# Patient Record
Sex: FEMALE | Race: WHITE | Employment: OTHER | URBAN - NONMETROPOLITAN AREA
[De-identification: names, ages, dates, MRNs, and addresses within clinical notes are randomized per-mention and may not be internally consistent; named-entity substitution may affect disease eponyms.]

---

## 2019-01-01 ENCOUNTER — TELEPHONE (OUTPATIENT)
Dept: NEPHROLOGY | Age: 84
End: 2019-01-01

## 2019-01-01 ENCOUNTER — HOSPITAL ENCOUNTER (OUTPATIENT)
Age: 84
Discharge: HOME | End: 2019-12-19
Payer: MEDICARE

## 2019-01-01 DIAGNOSIS — I48.21 PERMANENT ATRIAL FIBRILLATION (HCC): ICD-10-CM

## 2019-01-01 DIAGNOSIS — I50.42 CHRONIC COMBINED SYSTOLIC AND DIASTOLIC CONGESTIVE HEART FAILURE (HCC): ICD-10-CM

## 2019-01-01 DIAGNOSIS — I42.0 DILATED CARDIOMYOPATHY (HCC): ICD-10-CM

## 2019-01-01 DIAGNOSIS — I34.0 SEVERE MITRAL REGURGITATION BY PRIOR ECHOCARDIOGRAM: ICD-10-CM

## 2019-01-01 DIAGNOSIS — N18.30 CKD (CHRONIC KIDNEY DISEASE), STAGE III (HCC): ICD-10-CM

## 2019-01-01 LAB
ANION GAP SERPL CALCULATED.3IONS-SCNC: 14 MEQ/L (ref 8–16)
BUN BLDV-MCNC: 30 MG/DL (ref 7–22)
CALCIUM SERPL-MCNC: 9.9 MG/DL (ref 8.5–10.5)
CHLORIDE BLD-SCNC: 95 MEQ/L (ref 98–111)
CO2: 29 MEQ/L (ref 23–33)
CREAT SERPL-MCNC: 0.8 MG/DL (ref 0.4–1.2)
ERYTHROCYTE [DISTWIDTH] IN BLOOD BY AUTOMATED COUNT: 17.8 % (ref 11.5–14.5)
ERYTHROCYTE [DISTWIDTH] IN BLOOD BY AUTOMATED COUNT: 67.3 FL (ref 35–45)
GFR SERPL CREATININE-BSD FRML MDRD: 67 ML/MIN/1.73M2
GLUCOSE BLD-MCNC: 99 MG/DL (ref 70–108)
HCT VFR BLD CALC: 50.3 % (ref 37–47)
HEMOGLOBIN: 15.5 GM/DL (ref 12–16)
MAGNESIUM: 2.3 MG/DL (ref 1.6–2.4)
MCH RBC QN AUTO: 31.5 PG (ref 26–33)
MCHC RBC AUTO-ENTMCNC: 30.8 GM/DL (ref 32.2–35.5)
MCV RBC AUTO: 102.2 FL (ref 81–99)
PLATELET # BLD: 168 THOU/MM3 (ref 130–400)
PMV BLD AUTO: 11.8 FL (ref 9.4–12.4)
POTASSIUM SERPL-SCNC: 4.8 MEQ/L (ref 3.5–5.2)
RBC # BLD: 4.92 MILL/MM3 (ref 4.2–5.4)
SCAN OF BLOOD SMEAR: NORMAL
SODIUM BLD-SCNC: 138 MEQ/L (ref 135–145)
WBC # BLD: 6.2 THOU/MM3 (ref 4.8–10.8)

## 2019-01-01 PROCEDURE — 80048 BASIC METABOLIC PNL TOTAL CA: CPT

## 2019-01-01 PROCEDURE — 85027 COMPLETE CBC AUTOMATED: CPT

## 2019-01-01 PROCEDURE — 83735 ASSAY OF MAGNESIUM: CPT

## 2019-01-01 PROCEDURE — 36415 COLL VENOUS BLD VENIPUNCTURE: CPT

## 2019-01-01 RX ORDER — MIDODRINE HYDROCHLORIDE 10 MG/1
10 TABLET ORAL
Qty: 90 TABLET | Refills: 2 | Status: SHIPPED | OUTPATIENT
Start: 2019-01-01 | End: 2020-01-01

## 2019-01-01 RX ORDER — MIDODRINE HYDROCHLORIDE 10 MG/1
TABLET ORAL
Qty: 90 TABLET | Refills: 10 | OUTPATIENT
Start: 2019-01-01

## 2019-07-28 ENCOUNTER — APPOINTMENT (OUTPATIENT)
Dept: GENERAL RADIOLOGY | Age: 84
DRG: 469 | End: 2019-07-28
Payer: MEDICARE

## 2019-07-28 ENCOUNTER — HOSPITAL ENCOUNTER (INPATIENT)
Age: 84
LOS: 9 days | Discharge: NURSING, REHAB OR LONG-TERM CARE | DRG: 469 | End: 2019-08-06
Attending: INTERNAL MEDICINE | Admitting: INTERNAL MEDICINE
Payer: MEDICARE

## 2019-07-28 ENCOUNTER — APPOINTMENT (OUTPATIENT)
Dept: CT IMAGING | Age: 84
DRG: 469 | End: 2019-07-28
Payer: MEDICARE

## 2019-07-28 DIAGNOSIS — E86.0 DEHYDRATION: ICD-10-CM

## 2019-07-28 DIAGNOSIS — S72.002A CLOSED LEFT HIP FRACTURE, INITIAL ENCOUNTER (HCC): Primary | ICD-10-CM

## 2019-07-28 DIAGNOSIS — R74.8 ELEVATED CK: ICD-10-CM

## 2019-07-28 DIAGNOSIS — R77.8 ELEVATED TROPONIN: ICD-10-CM

## 2019-07-28 PROBLEM — R60.0 LOWER EXTREMITY EDEMA: Status: ACTIVE | Noted: 2019-07-28

## 2019-07-28 PROBLEM — R09.02 HYPOXIA: Status: ACTIVE | Noted: 2019-07-28

## 2019-07-28 PROBLEM — N17.9 AKI (ACUTE KIDNEY INJURY) (HCC): Status: ACTIVE | Noted: 2019-07-28

## 2019-07-28 PROBLEM — S72.001A HIP FRACTURE, RIGHT, CLOSED, INITIAL ENCOUNTER (HCC): Status: ACTIVE | Noted: 2019-07-28

## 2019-07-28 PROBLEM — I95.9 HYPOTENSION: Status: ACTIVE | Noted: 2019-07-28

## 2019-07-28 PROBLEM — E87.20 LACTIC ACIDOSIS: Status: ACTIVE | Noted: 2019-07-28

## 2019-07-28 LAB
ABO CHECK: NORMAL
ALBUMIN SERPL-MCNC: 3.6 G/DL (ref 3.5–5.1)
ALLEN TEST: ABNORMAL
ALP BLD-CCNC: 102 U/L (ref 38–126)
ALT SERPL-CCNC: 81 U/L (ref 11–66)
ANION GAP SERPL CALCULATED.3IONS-SCNC: 20 MEQ/L (ref 8–16)
AST SERPL-CCNC: 128 U/L (ref 5–40)
BASE EXCESS (CALCULATED): -5.7 MMOL/L (ref -2.5–2.5)
BASOPHILS # BLD: 0.1 %
BASOPHILS ABSOLUTE: 0 THOU/MM3 (ref 0–0.1)
BILIRUB SERPL-MCNC: 2.9 MG/DL (ref 0.3–1.2)
BILIRUBIN DIRECT: 1.3 MG/DL (ref 0–0.3)
BUN BLDV-MCNC: 50 MG/DL (ref 7–22)
CALCIUM SERPL-MCNC: 9.5 MG/DL (ref 8.5–10.5)
CHLORIDE BLD-SCNC: 101 MEQ/L (ref 98–111)
CO2: 19 MEQ/L (ref 23–33)
COLLECTED BY:: ABNORMAL
CREAT SERPL-MCNC: 1.7 MG/DL (ref 0.4–1.2)
DEVICE: ABNORMAL
EKG ATRIAL RATE: 174 BPM
EKG Q-T INTERVAL: 340 MS
EKG QRS DURATION: 86 MS
EKG QTC CALCULATION (BAZETT): 486 MS
EKG R AXIS: 116 DEGREES
EKG T AXIS: 70 DEGREES
EKG VENTRICULAR RATE: 123 BPM
EOSINOPHIL # BLD: 0 %
EOSINOPHILS ABSOLUTE: 0 THOU/MM3 (ref 0–0.4)
ERYTHROCYTE [DISTWIDTH] IN BLOOD BY AUTOMATED COUNT: 17.1 % (ref 11.5–14.5)
ERYTHROCYTE [DISTWIDTH] IN BLOOD BY AUTOMATED COUNT: 57.4 FL (ref 35–45)
GFR SERPL CREATININE-BSD FRML MDRD: 28 ML/MIN/1.73M2
GLUCOSE BLD-MCNC: 97 MG/DL (ref 70–108)
HCO3: 18 MMOL/L (ref 23–28)
HCT VFR BLD CALC: 42 % (ref 37–47)
HEMOGLOBIN: 13 GM/DL (ref 12–16)
IMMATURE GRANS (ABS): 0.03 THOU/MM3 (ref 0–0.07)
IMMATURE GRANULOCYTES: 0.3 %
INR BLD: 1.54 (ref 0.85–1.13)
LACTIC ACID, SEPSIS: 4.9 MMOL/L (ref 0.5–1.9)
LACTIC ACID, SEPSIS: 5.1 MMOL/L (ref 0.5–1.9)
LYMPHOCYTES # BLD: 7.2 %
LYMPHOCYTES ABSOLUTE: 0.7 THOU/MM3 (ref 1–4.8)
MCH RBC QN AUTO: 29.3 PG (ref 26–33)
MCHC RBC AUTO-ENTMCNC: 31 GM/DL (ref 32.2–35.5)
MCV RBC AUTO: 94.8 FL (ref 81–99)
MONOCYTES # BLD: 8.2 %
MONOCYTES ABSOLUTE: 0.8 THOU/MM3 (ref 0.4–1.3)
NUCLEATED RED BLOOD CELLS: 0 /100 WBC
O2 SATURATION: 100 %
OSMOLALITY CALCULATION: 292.6 MOSMOL/KG (ref 275–300)
PCO2: 29 MMHG (ref 35–45)
PH BLOOD GAS: 7.4 (ref 7.35–7.45)
PLATELET # BLD: 105 THOU/MM3 (ref 130–400)
PMV BLD AUTO: 11.7 FL (ref 9.4–12.4)
PO2: 163 MMHG (ref 71–104)
POTASSIUM SERPL-SCNC: 5.3 MEQ/L (ref 3.5–5.2)
PRO-BNP: ABNORMAL PG/ML (ref 0–1800)
PROCALCITONIN: 0.56 NG/ML (ref 0.01–0.09)
RBC # BLD: 4.43 MILL/MM3 (ref 4.2–5.4)
RH FACTOR: NORMAL
SEG NEUTROPHILS: 84.2 %
SEGMENTED NEUTROPHILS ABSOLUTE COUNT: 8.1 THOU/MM3 (ref 1.8–7.7)
SELECTED GEL ANTIBODY SCREEN: NORMAL
SODIUM BLD-SCNC: 140 MEQ/L (ref 135–145)
SOURCE, BLOOD GAS: ABNORMAL
TOTAL CK: 1343 U/L (ref 30–135)
TOTAL PROTEIN: 6.9 G/DL (ref 6.1–8)
TROPONIN T: 0.13 NG/ML
TROPONIN T: 0.14 NG/ML
WBC # BLD: 9.6 THOU/MM3 (ref 4.8–10.8)

## 2019-07-28 PROCEDURE — 80048 BASIC METABOLIC PNL TOTAL CA: CPT

## 2019-07-28 PROCEDURE — 86885 COOMBS TEST INDIRECT QUAL: CPT

## 2019-07-28 PROCEDURE — 87040 BLOOD CULTURE FOR BACTERIA: CPT

## 2019-07-28 PROCEDURE — 80076 HEPATIC FUNCTION PANEL: CPT

## 2019-07-28 PROCEDURE — 36415 COLL VENOUS BLD VENIPUNCTURE: CPT

## 2019-07-28 PROCEDURE — 82550 ASSAY OF CK (CPK): CPT

## 2019-07-28 PROCEDURE — 84145 PROCALCITONIN (PCT): CPT

## 2019-07-28 PROCEDURE — 82803 BLOOD GASES ANY COMBINATION: CPT

## 2019-07-28 PROCEDURE — 85025 COMPLETE CBC W/AUTO DIFF WBC: CPT

## 2019-07-28 PROCEDURE — 99285 EMERGENCY DEPT VISIT HI MDM: CPT

## 2019-07-28 PROCEDURE — 2140000000 HC CCU INTERMEDIATE R&B

## 2019-07-28 PROCEDURE — 2700000000 HC OXYGEN THERAPY PER DAY

## 2019-07-28 PROCEDURE — 71045 X-RAY EXAM CHEST 1 VIEW: CPT

## 2019-07-28 PROCEDURE — 73502 X-RAY EXAM HIP UNI 2-3 VIEWS: CPT

## 2019-07-28 PROCEDURE — 86900 BLOOD TYPING SEROLOGIC ABO: CPT

## 2019-07-28 PROCEDURE — 2709999900 HC NON-CHARGEABLE SUPPLY

## 2019-07-28 PROCEDURE — 84484 ASSAY OF TROPONIN QUANT: CPT

## 2019-07-28 PROCEDURE — 83880 ASSAY OF NATRIURETIC PEPTIDE: CPT

## 2019-07-28 PROCEDURE — 36600 WITHDRAWAL OF ARTERIAL BLOOD: CPT

## 2019-07-28 PROCEDURE — 99223 1ST HOSP IP/OBS HIGH 75: CPT | Performed by: INTERNAL MEDICINE

## 2019-07-28 PROCEDURE — 2580000003 HC RX 258: Performed by: PHYSICIAN ASSISTANT

## 2019-07-28 PROCEDURE — 85610 PROTHROMBIN TIME: CPT

## 2019-07-28 PROCEDURE — 83605 ASSAY OF LACTIC ACID: CPT

## 2019-07-28 PROCEDURE — 86901 BLOOD TYPING SEROLOGIC RH(D): CPT

## 2019-07-28 PROCEDURE — 93005 ELECTROCARDIOGRAM TRACING: CPT | Performed by: INTERNAL MEDICINE

## 2019-07-28 RX ORDER — SODIUM CHLORIDE 0.9 % (FLUSH) 0.9 %
10 SYRINGE (ML) INJECTION EVERY 12 HOURS SCHEDULED
Status: DISCONTINUED | OUTPATIENT
Start: 2019-07-28 | End: 2019-08-02

## 2019-07-28 RX ORDER — ASPIRIN 81 MG/1
81 TABLET, CHEWABLE ORAL DAILY
Status: DISCONTINUED | OUTPATIENT
Start: 2019-07-28 | End: 2019-07-29

## 2019-07-28 RX ORDER — 0.9 % SODIUM CHLORIDE 0.9 %
500 INTRAVENOUS SOLUTION INTRAVENOUS ONCE
Status: COMPLETED | OUTPATIENT
Start: 2019-07-28 | End: 2019-07-28

## 2019-07-28 RX ORDER — DIGOXIN 0.25 MG/ML
250 INJECTION INTRAMUSCULAR; INTRAVENOUS ONCE
Status: COMPLETED | OUTPATIENT
Start: 2019-07-29 | End: 2019-07-29

## 2019-07-28 RX ORDER — POTASSIUM CHLORIDE 7.45 MG/ML
10 INJECTION INTRAVENOUS PRN
Status: DISCONTINUED | OUTPATIENT
Start: 2019-07-28 | End: 2019-08-06 | Stop reason: HOSPADM

## 2019-07-28 RX ORDER — HEPARIN SODIUM 10000 [USP'U]/100ML
12 INJECTION, SOLUTION INTRAVENOUS CONTINUOUS
Status: DISCONTINUED | OUTPATIENT
Start: 2019-07-28 | End: 2019-07-28

## 2019-07-28 RX ORDER — HEPARIN SODIUM 1000 [USP'U]/ML
2000 INJECTION, SOLUTION INTRAVENOUS; SUBCUTANEOUS ONCE
Status: DISCONTINUED | OUTPATIENT
Start: 2019-07-28 | End: 2019-07-28

## 2019-07-28 RX ORDER — SODIUM CHLORIDE 9 MG/ML
INJECTION, SOLUTION INTRAVENOUS CONTINUOUS
Status: ACTIVE | OUTPATIENT
Start: 2019-07-28 | End: 2019-07-29

## 2019-07-28 RX ORDER — 0.9 % SODIUM CHLORIDE 0.9 %
500 INTRAVENOUS SOLUTION INTRAVENOUS ONCE
Status: DISCONTINUED | OUTPATIENT
Start: 2019-07-28 | End: 2019-08-06 | Stop reason: HOSPADM

## 2019-07-28 RX ORDER — HEPARIN SODIUM 1000 [USP'U]/ML
2000 INJECTION, SOLUTION INTRAVENOUS; SUBCUTANEOUS PRN
Status: DISCONTINUED | OUTPATIENT
Start: 2019-07-28 | End: 2019-07-28

## 2019-07-28 RX ORDER — ACETAMINOPHEN 325 MG/1
650 TABLET ORAL EVERY 4 HOURS PRN
Status: DISCONTINUED | OUTPATIENT
Start: 2019-07-28 | End: 2019-08-06 | Stop reason: HOSPADM

## 2019-07-28 RX ORDER — ONDANSETRON 2 MG/ML
4 INJECTION INTRAMUSCULAR; INTRAVENOUS EVERY 6 HOURS PRN
Status: DISCONTINUED | OUTPATIENT
Start: 2019-07-28 | End: 2019-08-06 | Stop reason: HOSPADM

## 2019-07-28 RX ORDER — POTASSIUM CHLORIDE 20 MEQ/1
40 TABLET, EXTENDED RELEASE ORAL PRN
Status: DISCONTINUED | OUTPATIENT
Start: 2019-07-28 | End: 2019-08-06 | Stop reason: HOSPADM

## 2019-07-28 RX ORDER — SODIUM CHLORIDE 0.9 % (FLUSH) 0.9 %
10 SYRINGE (ML) INJECTION PRN
Status: DISCONTINUED | OUTPATIENT
Start: 2019-07-28 | End: 2019-08-02

## 2019-07-28 RX ORDER — HEPARIN SODIUM 1000 [USP'U]/ML
4000 INJECTION, SOLUTION INTRAVENOUS; SUBCUTANEOUS PRN
Status: DISCONTINUED | OUTPATIENT
Start: 2019-07-28 | End: 2019-07-28

## 2019-07-28 RX ADMIN — SODIUM CHLORIDE 500 ML: 9 INJECTION, SOLUTION INTRAVENOUS at 18:03

## 2019-07-28 NOTE — ED PROVIDER NOTES
Reason for Visit: Leg Pain (left thigh) and Leg Swelling (bilateral)      Patient History    HPI: This 80 y.o. female with a past medical history of HTN. The patient presents to the ED via EMS for evaluation of left leg weakness. The patient states that two days ago she felt a pain in her left thigh which was present for a couple hours and then resolved. The patient has not had any pain in her thigh since, however, began to have weakness in the left leg today. She reports having difficulty walking due to this weakness. Patient normally ambulates without assistance. Family found the patient sitting on the floor today when they checked on her. The patient also noticed increased swelling to her BLE today. The patient does not have any pain at this time. The patient is not anticoagulated. No chest pain, headache, numbness. No additional complaints or concerns at the time of initial evaluation. Past Medical History:   Diagnosis Date    GERD (gastroesophageal reflux disease)     History of rectal cancer 2008    Hypertension        Past Surgical History:   Procedure Laterality Date    COLONOSCOPY      COLOSTOMY      PARTIAL HYSTERECTOMY      TONSILLECTOMY         Margareth Noel   Social History     Socioeconomic History    Marital status:       Spouse name: Not on file    Number of children: Not on file    Years of education: Not on file    Highest education level: Not on file   Occupational History    Not on file   Social Needs    Financial resource strain: Not on file    Food insecurity:     Worry: Not on file     Inability: Not on file    Transportation needs:     Medical: Not on file     Non-medical: Not on file   Tobacco Use    Smoking status: Never Smoker    Smokeless tobacco: Never Used   Substance and Sexual Activity    Alcohol use: No    Drug use: Not on file    Sexual activity: Not on file   Lifestyle    Physical activity:     Days per week: Not on file     Minutes per session: Not All other components within normal limits   HEPATIC FUNCTION PANEL - Abnormal; Notable for the following components: Total Bilirubin 2.9 (*)     Bilirubin, Direct 1.3 (*)      (*)     ALT 81 (*)     All other components within normal limits   PROTIME-INR - Abnormal; Notable for the following components:    INR 1.54 (*)     All other components within normal limits   TROPONIN - Abnormal; Notable for the following components:    Troponin T 0.143 (*)     All other components within normal limits   CK - Abnormal; Notable for the following components:     Total CK 1,343 (*)     All other components within normal limits   BRAIN NATRIURETIC PEPTIDE - Abnormal; Notable for the following components:    Pro-BNP 21067.0 (*)     All other components within normal limits   LACTATE, SEPSIS - Abnormal; Notable for the following components:    Lactic Acid, Sepsis 4.9 (*)     All other components within normal limits   LACTATE, SEPSIS - Abnormal; Notable for the following components:    Lactic Acid, Sepsis 5.1 (*)     All other components within normal limits   ANION GAP - Abnormal; Notable for the following components:    Anion Gap 20.0 (*)     All other components within normal limits   GLOMERULAR FILTRATION RATE, ESTIMATED - Abnormal; Notable for the following components:    Est, Glom Filt Rate 28 (*)     All other components within normal limits   BLOOD GAS, ARTERIAL - Abnormal; Notable for the following components:    PCO2 29 (*)     PO2 163 (*)     HCO3 18 (*)     Base Excess (Calculated) -5.7 (*)     All other components within normal limits   PROCALCITONIN - Abnormal; Notable for the following components:    Procalcitonin 0.56 (*)     All other components within normal limits   CULTURE BLOOD #1   CULTURE BLOOD #2   OSMOLALITY   SELECTED ANTIBODY SCREEN   URINALYSIS   TROPONIN   TROPONIN   CBC   COMPREHENSIVE METABOLIC PANEL W/ REFLEX TO MG FOR LOW K   ABO/RH       Radiology:    XR HIP 2-3 VW W PELVIS LEFT   Final

## 2019-07-28 NOTE — H&P
History & Physical      PCP: Fouzia Varghese. Edwin Swift, APRN - CNP    Date of Admission: 7/28/2019    Date of Service: 7/28/19    Chief Complaint:  Hip fracture    History Of Present Illness:    History obtained from chart review and the patient. 80 y.o. female who presented to [address] [place] [address] with left leg weakness and thigh pain. Patient denied any recent fall, but cites pain that began two days ago culminating in ending up on the floor and being unable to get up. She is typically quite active, takes the stairs at home, but has had difficulty walking these past two days. When seen she denies any complaints, but admits that remaining still in bed is likely helping her pain and she would probably still have pain were she to try to ambulate. She denies any SOB or chest pain, does admit to chronic LE edema. Patient cites history of AFib but has not been on any anticoagulation in the past.    Past Medical History:          Diagnosis Date    Hypertension        Past Surgical History:          Procedure Laterality Date    PARTIAL HYSTERECTOMY      TONSILLECTOMY         Medications Prior to Admission:      Prior to Admission medications    Medication Sig Start Date End Date Taking? Authorizing Provider   LISINOPRIL PO Take  by mouth. Yes Historical Provider, MD   aspirin 81 MG tablet Take 81 mg by mouth daily. Historical Provider, MD   Multiple Vitamins-Minerals (MULTIVITAMIN PO) Take 1 tablet by mouth. Historical Provider, MD       Allergies:  Patient has no known allergies. Social History:      The patient currently lives at home    TOBACCO:   reports that she has never smoked. She has never used smokeless tobacco.  ETOH:   reports that she does not drink alcohol. Family History:      Reviewed in detail.  Positive as follows:        Problem Relation Age of Onset    Heart Failure Father         CONGESTIVE    Other Mother         ULCER DISEASE    Arthritis Sister    [facility] Other Brother VASCULAR DISEASE    Other Son         HYPOTHYROIDISM       REVIEW OF SYSTEMS:   14 point review of system performed, pertinent positives as noted in the HPI, all other systems negative/at baseline. PHYSICAL EXAM:    /68   Pulse 110   Temp 95.7 °F (35.4 °C) (Oral)   Resp 18   Ht 5' 5\" (1.651 m)   Wt 152 lb (68.9 kg)   SpO2 91%   BMI 25.29 kg/m²       General appearance:  No apparent distress, appears stated age and cooperative. HEENT:  Normal cephalic, atraumatic without obvious deformity. Pupils equal, round, and reactive to light. Extra ocular muscles intact. Conjunctivae/corneas clear. Neck: Supple, with full range of motion. No jugular venous distention. Trachea midline. Respiratory:  Normal respiratory effort. Clear to auscultation, bilaterally without Rales/Wheezes/Rhonchi. Cardiovascular:  IRR with normal S1/S2 without murmurs, rubs or gallops. Abdomen: Soft, non-tender, non-distended with normal bowel sounds. Musculoskeletal:  No clubbing, cyanosis, but massive edema bilaterally to waist.  ROM deferred due to known orthopedic injury  Skin: Skin color, texture, turgor normal.  No rashes or lesions. Neurologic:  Neurovascularly intact without any focal sensory/motor deficits.  Cranial nerves: II-XII intact, grossly non-focal.  Psychiatric:  Alert and oriented, thought content appropriate, normal insight  Capillary Refill: Brisk,< 3 seconds   Peripheral Pulses: +2 palpable, equal bilaterally       Labs:     Recent Labs     07/28/19  1800   WBC 9.6   HGB 13.0   HCT 42.0   *     Recent Labs     07/28/19  1800      K 5.3*      CO2 19*   BUN 50*   CREATININE 1.7*   CALCIUM 9.5     Recent Labs     07/28/19  1800   *   ALT 81*   BILIDIR 1.3*   BILITOT 2.9*   ALKPHOS 102     Recent Labs     07/28/19  1800   INR 1.54*     Recent Labs     07/28/19  1800   CKTOTAL 1,343*       Urinalysis:    No results found for: Cely Parents, [address], BACTERIA, RBCUA, BLOODU, [address],

## 2019-07-28 NOTE — ED NOTES
Lab at bedside for repeat draw, respiratory also at bedside to draw blood gas.      Beatris Barnard RN  07/28/19 5661

## 2019-07-29 ENCOUNTER — APPOINTMENT (OUTPATIENT)
Dept: INTERVENTIONAL RADIOLOGY/VASCULAR | Age: 84
DRG: 469 | End: 2019-07-29
Payer: MEDICARE

## 2019-07-29 ENCOUNTER — APPOINTMENT (OUTPATIENT)
Dept: GENERAL RADIOLOGY | Age: 84
DRG: 469 | End: 2019-07-29
Payer: MEDICARE

## 2019-07-29 PROBLEM — S72.002A CLOSED FRACTURE OF LEFT HIP (HCC): Status: ACTIVE | Noted: 2019-07-28

## 2019-07-29 LAB
ALBUMIN SERPL-MCNC: 3 G/DL (ref 3.5–5.1)
ALP BLD-CCNC: 89 U/L (ref 38–126)
ALT SERPL-CCNC: 97 U/L (ref 11–66)
ANION GAP SERPL CALCULATED.3IONS-SCNC: 19 MEQ/L (ref 8–16)
AST SERPL-CCNC: 148 U/L (ref 5–40)
BACTERIA: ABNORMAL
BILIRUB SERPL-MCNC: 1.1 MG/DL (ref 0.3–1.2)
BILIRUBIN URINE: ABNORMAL
BLOOD, URINE: ABNORMAL
BUN BLDV-MCNC: 53 MG/DL (ref 7–22)
CALCIUM SERPL-MCNC: 8.9 MG/DL (ref 8.5–10.5)
CASTS: ABNORMAL /LPF
CASTS: ABNORMAL /LPF
CHARACTER, URINE: ABNORMAL
CHLORIDE BLD-SCNC: 104 MEQ/L (ref 98–111)
CO2: 18 MEQ/L (ref 23–33)
COLOR: ABNORMAL
CREAT SERPL-MCNC: 1.9 MG/DL (ref 0.4–1.2)
CRYSTALS: ABNORMAL
EPITHELIAL CELLS, UA: ABNORMAL /HPF
ERYTHROCYTE [DISTWIDTH] IN BLOOD BY AUTOMATED COUNT: 17.2 % (ref 11.5–14.5)
ERYTHROCYTE [DISTWIDTH] IN BLOOD BY AUTOMATED COUNT: 58.7 FL (ref 35–45)
GFR SERPL CREATININE-BSD FRML MDRD: 25 ML/MIN/1.73M2
GLUCOSE BLD-MCNC: 162 MG/DL (ref 70–108)
GLUCOSE, URINE: NEGATIVE MG/DL
HCT VFR BLD CALC: 40.7 % (ref 37–47)
HEMOGLOBIN: 12.7 GM/DL (ref 12–16)
ICTOTEST: NEGATIVE
KETONES, URINE: NEGATIVE
LEUKOCYTE EST, POC: ABNORMAL
LV EF: 43 %
LVEF MODALITY: NORMAL
MCH RBC QN AUTO: 29.8 PG (ref 26–33)
MCHC RBC AUTO-ENTMCNC: 31.2 GM/DL (ref 32.2–35.5)
MCV RBC AUTO: 95.5 FL (ref 81–99)
MISCELLANEOUS LAB TEST RESULT: ABNORMAL
NITRITE, URINE: NEGATIVE
OSMOLALITY CALCULATION: 299.2 MOSMOL/KG (ref 275–300)
PH UA: 5 (ref 5–9)
PLATELET # BLD: 98 THOU/MM3 (ref 130–400)
PMV BLD AUTO: 12.1 FL (ref 9.4–12.4)
POTASSIUM REFLEX MAGNESIUM: 5.3 MEQ/L (ref 3.5–5.2)
PROTEIN UA: 100 MG/DL
RBC # BLD: 4.26 MILL/MM3 (ref 4.2–5.4)
RBC URINE: ABNORMAL /HPF
RENAL EPITHELIAL, UA: ABNORMAL
SODIUM BLD-SCNC: 141 MEQ/L (ref 135–145)
SPECIFIC GRAVITY UA: 1.02 (ref 1–1.03)
TOTAL CK: 910 U/L (ref 30–135)
TOTAL PROTEIN: 6.1 G/DL (ref 6.1–8)
TROPONIN T: 0.1 NG/ML
UROBILINOGEN, URINE: 1 EU/DL (ref 0–1)
WBC # BLD: 7.7 THOU/MM3 (ref 4.8–10.8)
WBC UA: ABNORMAL /HPF
YEAST: ABNORMAL

## 2019-07-29 PROCEDURE — 6360000002 HC RX W HCPCS: Performed by: INTERNAL MEDICINE

## 2019-07-29 PROCEDURE — 93306 TTE W/DOPPLER COMPLETE: CPT

## 2019-07-29 PROCEDURE — 93970 EXTREMITY STUDY: CPT

## 2019-07-29 PROCEDURE — 6370000000 HC RX 637 (ALT 250 FOR IP): Performed by: INTERNAL MEDICINE

## 2019-07-29 PROCEDURE — 85027 COMPLETE CBC AUTOMATED: CPT

## 2019-07-29 PROCEDURE — 71045 X-RAY EXAM CHEST 1 VIEW: CPT

## 2019-07-29 PROCEDURE — 2580000003 HC RX 258: Performed by: INTERNAL MEDICINE

## 2019-07-29 PROCEDURE — 84484 ASSAY OF TROPONIN QUANT: CPT

## 2019-07-29 PROCEDURE — 06H03DZ INSERTION OF INTRALUMINAL DEVICE INTO INFERIOR VENA CAVA, PERCUTANEOUS APPROACH: ICD-10-PCS | Performed by: RADIOLOGY

## 2019-07-29 PROCEDURE — 2709999900 HC NON-CHARGEABLE SUPPLY

## 2019-07-29 PROCEDURE — 36415 COLL VENOUS BLD VENIPUNCTURE: CPT

## 2019-07-29 PROCEDURE — 99233 SBSQ HOSP IP/OBS HIGH 50: CPT | Performed by: INTERNAL MEDICINE

## 2019-07-29 PROCEDURE — 93010 ELECTROCARDIOGRAM REPORT: CPT | Performed by: INTERNAL MEDICINE

## 2019-07-29 PROCEDURE — 99223 1ST HOSP IP/OBS HIGH 75: CPT | Performed by: INTERNAL MEDICINE

## 2019-07-29 PROCEDURE — 81001 URINALYSIS AUTO W/SCOPE: CPT

## 2019-07-29 PROCEDURE — 6820000001 HC L2 TRAUMA SURGERY EVALUATION

## 2019-07-29 PROCEDURE — 82550 ASSAY OF CK (CPK): CPT

## 2019-07-29 PROCEDURE — 80053 COMPREHEN METABOLIC PANEL: CPT

## 2019-07-29 PROCEDURE — 2140000000 HC CCU INTERMEDIATE R&B

## 2019-07-29 RX ORDER — DIGOXIN 0.25 MG/ML
125 INJECTION INTRAMUSCULAR; INTRAVENOUS ONCE
Status: COMPLETED | OUTPATIENT
Start: 2019-07-29 | End: 2019-07-29

## 2019-07-29 RX ORDER — SODIUM CHLORIDE 9 MG/ML
INJECTION, SOLUTION INTRAVENOUS CONTINUOUS
Status: DISCONTINUED | OUTPATIENT
Start: 2019-07-29 | End: 2019-07-31

## 2019-07-29 RX ORDER — ASCORBIC ACID 500 MG
500 TABLET ORAL DAILY
COMMUNITY
End: 2019-09-03

## 2019-07-29 RX ORDER — 0.9 % SODIUM CHLORIDE 0.9 %
250 INTRAVENOUS SOLUTION INTRAVENOUS ONCE
Status: COMPLETED | OUTPATIENT
Start: 2019-07-29 | End: 2019-07-29

## 2019-07-29 RX ORDER — MIDODRINE HYDROCHLORIDE 10 MG/1
10 TABLET ORAL
Status: DISCONTINUED | OUTPATIENT
Start: 2019-07-29 | End: 2019-08-06 | Stop reason: HOSPADM

## 2019-07-29 RX ORDER — VITAMIN E 268 MG
400 CAPSULE ORAL DAILY
COMMUNITY
End: 2019-09-03

## 2019-07-29 RX ORDER — CHLORAL HYDRATE 500 MG
1000 CAPSULE ORAL DAILY
COMMUNITY
End: 2019-09-03

## 2019-07-29 RX ORDER — DIMENHYDRINATE 50 MG
1000 TABLET ORAL DAILY
COMMUNITY
End: 2019-09-03

## 2019-07-29 RX ORDER — LANOLIN ALCOHOL/MO/W.PET/CERES
1 CREAM (GRAM) TOPICAL DAILY
COMMUNITY
End: 2019-09-03

## 2019-07-29 RX ADMIN — SODIUM CHLORIDE: 9 INJECTION, SOLUTION INTRAVENOUS at 14:11

## 2019-07-29 RX ADMIN — SODIUM CHLORIDE 250 ML: 9 INJECTION, SOLUTION INTRAVENOUS at 10:33

## 2019-07-29 RX ADMIN — SODIUM CHLORIDE: 9 INJECTION, SOLUTION INTRAVENOUS at 03:44

## 2019-07-29 RX ADMIN — MIDODRINE HYDROCHLORIDE 10 MG: 10 TABLET ORAL at 12:01

## 2019-07-29 RX ADMIN — DIGOXIN 250 MCG: 0.25 INJECTION INTRAMUSCULAR; INTRAVENOUS at 00:49

## 2019-07-29 RX ADMIN — Medication 10 ML: at 10:33

## 2019-07-29 RX ADMIN — CEFTRIAXONE SODIUM 1 G: 1 INJECTION, POWDER, FOR SOLUTION INTRAMUSCULAR; INTRAVENOUS at 00:49

## 2019-07-29 RX ADMIN — DIGOXIN 125 MCG: 0.25 INJECTION INTRAMUSCULAR; INTRAVENOUS at 10:37

## 2019-07-29 RX ADMIN — MIDODRINE HYDROCHLORIDE 10 MG: 10 TABLET ORAL at 17:21

## 2019-07-29 NOTE — FLOWSHEET NOTE
07/29/19 1032   Encounter Summary   Services provided to: Patient and family together   Referral/Consult From: [facility]   Continue Visiting Yes  (7/29/19 )   Complexity of Encounter Low   Length of Encounter 15 minutes   Routine   Type Initial   Assessment Calm; Approachable   Intervention Active listening;Nurtured hope   Outcome Acceptance;Comfort   S- During my contact with the 80 yr old patient and the family, I wanted to assess what their       spiritual and emotional needs were. O-  The pt was in bed and the family was supportively present. The pt attends [address] The pt was coping from a hip fracture. A- The pt was receptive when I offered emotional support   P-  Continued support would be helpful in order to meet the future Spiritual needs of the         patient.

## 2019-07-30 ENCOUNTER — APPOINTMENT (OUTPATIENT)
Dept: GENERAL RADIOLOGY | Age: 84
DRG: 469 | End: 2019-07-30
Payer: MEDICARE

## 2019-07-30 ENCOUNTER — APPOINTMENT (OUTPATIENT)
Dept: INTERVENTIONAL RADIOLOGY/VASCULAR | Age: 84
DRG: 469 | End: 2019-07-30
Payer: MEDICARE

## 2019-07-30 ENCOUNTER — ANESTHESIA (OUTPATIENT)
Dept: OPERATING ROOM | Age: 84
DRG: 469 | End: 2019-07-30
Payer: MEDICARE

## 2019-07-30 ENCOUNTER — ANESTHESIA EVENT (OUTPATIENT)
Dept: OPERATING ROOM | Age: 84
DRG: 469 | End: 2019-07-30
Payer: MEDICARE

## 2019-07-30 VITALS
TEMPERATURE: 96.8 F | SYSTOLIC BLOOD PRESSURE: 94 MMHG | OXYGEN SATURATION: 96 % | DIASTOLIC BLOOD PRESSURE: 53 MMHG | RESPIRATION RATE: 5 BRPM

## 2019-07-30 PROBLEM — T79.6XXA TRAUMATIC RHABDOMYOLYSIS (HCC): Status: ACTIVE | Noted: 2019-07-30

## 2019-07-30 LAB
ANION GAP SERPL CALCULATED.3IONS-SCNC: 12 MEQ/L (ref 8–16)
BASE EXCESS (CALCULATED): -4.3 MMOL/L (ref -2.5–2.5)
BUN BLDV-MCNC: 59 MG/DL (ref 7–22)
CALCIUM IONIZED SERUM: 1.18 MMOL/L (ref 1.12–1.32)
CALCIUM SERPL-MCNC: 8.3 MG/DL (ref 8.5–10.5)
CHLORIDE BLD-SCNC: 106 MEQ/L (ref 98–111)
CO2: 19 MEQ/L (ref 23–33)
COLLECTED BY:: ABNORMAL
CREAT SERPL-MCNC: 1.6 MG/DL (ref 0.4–1.2)
DIGOXIN LEVEL: 1 NG/ML (ref 0.5–2)
ERYTHROCYTE [DISTWIDTH] IN BLOOD BY AUTOMATED COUNT: 17.5 % (ref 11.5–14.5)
ERYTHROCYTE [DISTWIDTH] IN BLOOD BY AUTOMATED COUNT: 59.7 FL (ref 35–45)
GFR SERPL CREATININE-BSD FRML MDRD: 30 ML/MIN/1.73M2
GLUCOSE BLD-MCNC: 91 MG/DL (ref 70–108)
GLUCOSE, WHOLE BLOOD: 120 MG/DL (ref 70–108)
HCO3: 22 MMOL/L (ref 23–28)
HCT VFR BLD CALC: 39.2 % (ref 37–47)
HEMOGLOBIN: 12.1 GM/DL (ref 12–16)
MCH RBC QN AUTO: 30 PG (ref 26–33)
MCHC RBC AUTO-ENTMCNC: 30.9 GM/DL (ref 32.2–35.5)
MCV RBC AUTO: 97 FL (ref 81–99)
O2 SATURATION: 100 %
PCO2: 41 MMHG (ref 35–45)
PH BLOOD GAS: 7.33 (ref 7.35–7.45)
PLATELET # BLD: 100 THOU/MM3 (ref 130–400)
PMV BLD AUTO: 12 FL (ref 9.4–12.4)
PO2: 270 MMHG (ref 71–104)
POTASSIUM SERPL-SCNC: 5.1 MEQ/L (ref 3.5–5.2)
POTASSIUM, WHOLE BLOOD: 4.9 MEQ/L (ref 3.5–4.9)
RBC # BLD: 4.04 MILL/MM3 (ref 4.2–5.4)
REASON FOR REJECTION: NORMAL
REJECTED TEST: NORMAL
SODIUM BLD-SCNC: 137 MEQ/L (ref 135–145)
SODIUM, WHOLE BLOOD: 140 MEQ/L (ref 138–146)
WBC # BLD: 8.2 THOU/MM3 (ref 4.8–10.8)

## 2019-07-30 PROCEDURE — 2720000010 HC SURG SUPPLY STERILE: Performed by: ORTHOPAEDIC SURGERY

## 2019-07-30 PROCEDURE — 6360000004 HC RX CONTRAST MEDICATION: Performed by: RADIOLOGY

## 2019-07-30 PROCEDURE — 2709999900 HC NON-CHARGEABLE SUPPLY

## 2019-07-30 PROCEDURE — 80048 BASIC METABOLIC PNL TOTAL CA: CPT

## 2019-07-30 PROCEDURE — 2140000000 HC CCU INTERMEDIATE R&B

## 2019-07-30 PROCEDURE — C1769 GUIDE WIRE: HCPCS

## 2019-07-30 PROCEDURE — 82330 ASSAY OF CALCIUM: CPT

## 2019-07-30 PROCEDURE — 99232 SBSQ HOSP IP/OBS MODERATE 35: CPT | Performed by: PHYSICIAN ASSISTANT

## 2019-07-30 PROCEDURE — 82803 BLOOD GASES ANY COMBINATION: CPT

## 2019-07-30 PROCEDURE — 3600000005 HC SURGERY LEVEL 5 BASE: Performed by: ORTHOPAEDIC SURGERY

## 2019-07-30 PROCEDURE — 99233 SBSQ HOSP IP/OBS HIGH 50: CPT | Performed by: INTERNAL MEDICINE

## 2019-07-30 PROCEDURE — 7100000001 HC PACU RECOVERY - ADDTL 15 MIN: Performed by: ORTHOPAEDIC SURGERY

## 2019-07-30 PROCEDURE — 2709999900 HC NON-CHARGEABLE SUPPLY: Performed by: ORTHOPAEDIC SURGERY

## 2019-07-30 PROCEDURE — 3700000000 HC ANESTHESIA ATTENDED CARE: Performed by: ORTHOPAEDIC SURGERY

## 2019-07-30 PROCEDURE — 3700000001 HC ADD 15 MINUTES (ANESTHESIA): Performed by: ORTHOPAEDIC SURGERY

## 2019-07-30 PROCEDURE — 84132 ASSAY OF SERUM POTASSIUM: CPT

## 2019-07-30 PROCEDURE — 7100000000 HC PACU RECOVERY - FIRST 15 MIN: Performed by: ORTHOPAEDIC SURGERY

## 2019-07-30 PROCEDURE — 0SRB0J9 REPLACEMENT OF LEFT HIP JOINT WITH SYNTHETIC SUBSTITUTE, CEMENTED, OPEN APPROACH: ICD-10-PCS | Performed by: ORTHOPAEDIC SURGERY

## 2019-07-30 PROCEDURE — 80162 ASSAY OF DIGOXIN TOTAL: CPT

## 2019-07-30 PROCEDURE — C1894 INTRO/SHEATH, NON-LASER: HCPCS

## 2019-07-30 PROCEDURE — C1776 JOINT DEVICE (IMPLANTABLE): HCPCS | Performed by: ORTHOPAEDIC SURGERY

## 2019-07-30 PROCEDURE — 36415 COLL VENOUS BLD VENIPUNCTURE: CPT

## 2019-07-30 PROCEDURE — 2580000003 HC RX 258: Performed by: INTERNAL MEDICINE

## 2019-07-30 PROCEDURE — 6370000000 HC RX 637 (ALT 250 FOR IP): Performed by: INTERNAL MEDICINE

## 2019-07-30 PROCEDURE — 85027 COMPLETE CBC AUTOMATED: CPT

## 2019-07-30 PROCEDURE — 2500000003 HC RX 250 WO HCPCS: Performed by: ORTHOPAEDIC SURGERY

## 2019-07-30 PROCEDURE — 6360000002 HC RX W HCPCS: Performed by: NURSE PRACTITIONER

## 2019-07-30 PROCEDURE — 6360000002 HC RX W HCPCS

## 2019-07-30 PROCEDURE — 37191 INS ENDOVAS VENA CAVA FILTR: CPT

## 2019-07-30 PROCEDURE — 6360000002 HC RX W HCPCS: Performed by: ORTHOPAEDIC SURGERY

## 2019-07-30 PROCEDURE — 2500000003 HC RX 250 WO HCPCS

## 2019-07-30 PROCEDURE — 6360000002 HC RX W HCPCS: Performed by: ANESTHESIOLOGY

## 2019-07-30 PROCEDURE — 2700000000 HC OXYGEN THERAPY PER DAY

## 2019-07-30 PROCEDURE — C1880 VENA CAVA FILTER: HCPCS

## 2019-07-30 PROCEDURE — 6370000000 HC RX 637 (ALT 250 FOR IP): Performed by: NURSE PRACTITIONER

## 2019-07-30 PROCEDURE — 2580000003 HC RX 258: Performed by: ORTHOPAEDIC SURGERY

## 2019-07-30 PROCEDURE — C1713 ANCHOR/SCREW BN/BN,TIS/BN: HCPCS | Performed by: ORTHOPAEDIC SURGERY

## 2019-07-30 PROCEDURE — 2500000003 HC RX 250 WO HCPCS: Performed by: ANESTHESIOLOGY

## 2019-07-30 PROCEDURE — 2580000003 HC RX 258: Performed by: NURSE PRACTITIONER

## 2019-07-30 PROCEDURE — 73502 X-RAY EXAM HIP UNI 2-3 VIEWS: CPT

## 2019-07-30 PROCEDURE — 82947 ASSAY GLUCOSE BLOOD QUANT: CPT

## 2019-07-30 PROCEDURE — 3600000015 HC SURGERY LEVEL 5 ADDTL 15MIN: Performed by: ORTHOPAEDIC SURGERY

## 2019-07-30 PROCEDURE — 84295 ASSAY OF SERUM SODIUM: CPT

## 2019-07-30 PROCEDURE — 36600 WITHDRAWAL OF ARTERIAL BLOOD: CPT

## 2019-07-30 PROCEDURE — 6370000000 HC RX 637 (ALT 250 FOR IP)

## 2019-07-30 PROCEDURE — 6370000000 HC RX 637 (ALT 250 FOR IP): Performed by: RADIOLOGY

## 2019-07-30 DEVICE — CONQUEST FX FEMORAL COMPONENT SIZE 12
Type: IMPLANTABLE DEVICE | Site: HIP | Status: FUNCTIONAL
Brand: CONQUEST FX

## 2019-07-30 DEVICE — TANDEM UNIPOLAR 12/14 TAPER SLEEVE +4
Type: IMPLANTABLE DEVICE | Site: HIP | Status: FUNCTIONAL
Brand: TANDEM

## 2019-07-30 DEVICE — TANDEM UNIPOLAR HEAD 47MM
Type: IMPLANTABLE DEVICE | Site: HIP | Status: FUNCTIONAL
Brand: TANDEM

## 2019-07-30 DEVICE — CEMENT BNE RADIOPAQUE SIMPLEX P SPEEDDET: Type: IMPLANTABLE DEVICE | Site: HIP | Status: FUNCTIONAL

## 2019-07-30 RX ORDER — SUCCINYLCHOLINE/SOD CL,ISO/PF 200MG/10ML
SYRINGE (ML) INTRAVENOUS PRN
Status: DISCONTINUED | OUTPATIENT
Start: 2019-07-30 | End: 2019-07-30 | Stop reason: SDUPTHER

## 2019-07-30 RX ORDER — MORPHINE SULFATE 4 MG/ML
4 INJECTION, SOLUTION INTRAMUSCULAR; INTRAVENOUS
Status: DISCONTINUED | OUTPATIENT
Start: 2019-07-30 | End: 2019-07-30

## 2019-07-30 RX ORDER — HYDROCODONE BITARTRATE AND ACETAMINOPHEN 5; 325 MG/1; MG/1
2 TABLET ORAL EVERY 4 HOURS PRN
Status: DISCONTINUED | OUTPATIENT
Start: 2019-07-30 | End: 2019-07-31

## 2019-07-30 RX ORDER — TRANEXAMIC ACID 100 MG/ML
INJECTION, SOLUTION INTRAVENOUS PRN
Status: DISCONTINUED | OUTPATIENT
Start: 2019-07-30 | End: 2019-07-30 | Stop reason: ALTCHOICE

## 2019-07-30 RX ORDER — MORPHINE SULFATE 2 MG/ML
2 INJECTION, SOLUTION INTRAMUSCULAR; INTRAVENOUS
Status: DISCONTINUED | OUTPATIENT
Start: 2019-07-30 | End: 2019-07-30

## 2019-07-30 RX ORDER — FENTANYL CITRATE 50 UG/ML
INJECTION, SOLUTION INTRAMUSCULAR; INTRAVENOUS PRN
Status: DISCONTINUED | OUTPATIENT
Start: 2019-07-30 | End: 2019-07-30 | Stop reason: SDUPTHER

## 2019-07-30 RX ORDER — KETOROLAC TROMETHAMINE 30 MG/ML
15 INJECTION, SOLUTION INTRAMUSCULAR; INTRAVENOUS EVERY 6 HOURS
Status: DISCONTINUED | OUTPATIENT
Start: 2019-07-30 | End: 2019-07-30

## 2019-07-30 RX ORDER — HYDROCODONE BITARTRATE AND ACETAMINOPHEN 5; 325 MG/1; MG/1
0.5 TABLET ORAL EVERY 4 HOURS PRN
Status: DISCONTINUED | OUTPATIENT
Start: 2019-07-30 | End: 2019-07-31

## 2019-07-30 RX ORDER — VASOPRESSIN 20 U/ML
INJECTION PARENTERAL PRN
Status: DISCONTINUED | OUTPATIENT
Start: 2019-07-30 | End: 2019-07-30 | Stop reason: SDUPTHER

## 2019-07-30 RX ORDER — EPINEPHRINE 1 MG/ML
INJECTION, SOLUTION, CONCENTRATE INTRAVENOUS PRN
Status: DISCONTINUED | OUTPATIENT
Start: 2019-07-30 | End: 2019-07-30 | Stop reason: SDUPTHER

## 2019-07-30 RX ORDER — MORPHINE SULFATE 2 MG/ML
2 INJECTION, SOLUTION INTRAMUSCULAR; INTRAVENOUS
Status: DISCONTINUED | OUTPATIENT
Start: 2019-07-30 | End: 2019-08-06 | Stop reason: HOSPADM

## 2019-07-30 RX ORDER — PROPOFOL 10 MG/ML
INJECTION, EMULSION INTRAVENOUS PRN
Status: DISCONTINUED | OUTPATIENT
Start: 2019-07-30 | End: 2019-07-30 | Stop reason: SDUPTHER

## 2019-07-30 RX ORDER — BACITRACIN, NEOMYCIN, POLYMYXIN B 400; 3.5; 5 [USP'U]/G; MG/G; [USP'U]/G
OINTMENT TOPICAL ONCE
Status: COMPLETED | OUTPATIENT
Start: 2019-07-30 | End: 2019-07-30

## 2019-07-30 RX ORDER — CYCLOBENZAPRINE HCL 10 MG
10 TABLET ORAL 3 TIMES DAILY PRN
Status: DISCONTINUED | OUTPATIENT
Start: 2019-07-30 | End: 2019-07-30

## 2019-07-30 RX ORDER — HYDROCODONE BITARTRATE AND ACETAMINOPHEN 5; 325 MG/1; MG/1
2 TABLET ORAL EVERY 4 HOURS PRN
Status: DISCONTINUED | OUTPATIENT
Start: 2019-07-30 | End: 2019-07-30

## 2019-07-30 RX ORDER — HYDROCODONE BITARTRATE AND ACETAMINOPHEN 5; 325 MG/1; MG/1
1-2 TABLET ORAL
Qty: 30 TABLET | Refills: 0 | Status: ON HOLD | OUTPATIENT
Start: 2019-07-30 | End: 2019-08-19 | Stop reason: HOSPADM

## 2019-07-30 RX ORDER — PHENYLEPHRINE HYDROCHLORIDE 10 MG/ML
INJECTION INTRAVENOUS PRN
Status: DISCONTINUED | OUTPATIENT
Start: 2019-07-30 | End: 2019-07-30 | Stop reason: SDUPTHER

## 2019-07-30 RX ORDER — HYDROCODONE BITARTRATE AND ACETAMINOPHEN 5; 325 MG/1; MG/1
1 TABLET ORAL EVERY 4 HOURS PRN
Status: DISCONTINUED | OUTPATIENT
Start: 2019-07-30 | End: 2019-07-30

## 2019-07-30 RX ORDER — MORPHINE SULFATE 2 MG/ML
1 INJECTION, SOLUTION INTRAMUSCULAR; INTRAVENOUS
Status: DISCONTINUED | OUTPATIENT
Start: 2019-07-30 | End: 2019-08-06 | Stop reason: HOSPADM

## 2019-07-30 RX ADMIN — SODIUM CHLORIDE: 9 INJECTION, SOLUTION INTRAVENOUS at 01:55

## 2019-07-30 RX ADMIN — PROPOFOL 50 MG: 10 INJECTION, EMULSION INTRAVENOUS at 11:53

## 2019-07-30 RX ADMIN — Medication 100 MG: at 11:53

## 2019-07-30 RX ADMIN — EPINEPHRINE 10 MCG: 1 INJECTION, SOLUTION, CONCENTRATE INTRAVENOUS at 11:59

## 2019-07-30 RX ADMIN — DEXTROSE MONOHYDRATE 2 G: 50 INJECTION, SOLUTION INTRAVENOUS at 16:54

## 2019-07-30 RX ADMIN — PHENYLEPHRINE HYDROCHLORIDE 100 MCG: 10 INJECTION INTRAVENOUS at 11:53

## 2019-07-30 RX ADMIN — MIDODRINE HYDROCHLORIDE 10 MG: 10 TABLET ORAL at 06:29

## 2019-07-30 RX ADMIN — PHENYLEPHRINE HYDROCHLORIDE 200 MCG: 10 INJECTION INTRAVENOUS at 11:57

## 2019-07-30 RX ADMIN — KETOROLAC TROMETHAMINE 15 MG: 30 INJECTION, SOLUTION INTRAMUSCULAR at 16:51

## 2019-07-30 RX ADMIN — PHENYLEPHRINE HYDROCHLORIDE 100 MCG: 10 INJECTION INTRAVENOUS at 11:56

## 2019-07-30 RX ADMIN — EPINEPHRINE 10 MCG: 1 INJECTION, SOLUTION, CONCENTRATE INTRAVENOUS at 12:32

## 2019-07-30 RX ADMIN — IOPAMIDOL 26 ML: 612 INJECTION, SOLUTION INTRAVENOUS at 08:11

## 2019-07-30 RX ADMIN — FENTANYL CITRATE 50 MCG: 50 INJECTION INTRAMUSCULAR; INTRAVENOUS at 11:50

## 2019-07-30 RX ADMIN — MIDODRINE HYDROCHLORIDE 10 MG: 10 TABLET ORAL at 16:58

## 2019-07-30 RX ADMIN — VASOPRESSIN 2 UNITS: 20 INJECTION INTRAVENOUS at 11:59

## 2019-07-30 RX ADMIN — VASOPRESSIN 2 UNITS: 20 INJECTION INTRAVENOUS at 12:00

## 2019-07-30 RX ADMIN — BACITRACIN, NEOMYCIN, POLYMYXIN B 1 G: 400; 3.5; 5 OINTMENT TOPICAL at 08:14

## 2019-07-30 RX ADMIN — VASOPRESSIN 2 UNITS: 20 INJECTION INTRAVENOUS at 12:24

## 2019-07-30 ASSESSMENT — PULMONARY FUNCTION TESTS
PIF_VALUE: 22
PIF_VALUE: 20
PIF_VALUE: 25
PIF_VALUE: 15
PIF_VALUE: 24
PIF_VALUE: 0
PIF_VALUE: 16
PIF_VALUE: 25
PIF_VALUE: 16
PIF_VALUE: 24
PIF_VALUE: 23
PIF_VALUE: 1
PIF_VALUE: 25
PIF_VALUE: 25
PIF_VALUE: 16
PIF_VALUE: 24
PIF_VALUE: 6
PIF_VALUE: 25
PIF_VALUE: 21
PIF_VALUE: 0
PIF_VALUE: 3
PIF_VALUE: 16
PIF_VALUE: 20
PIF_VALUE: 16
PIF_VALUE: 25
PIF_VALUE: 23
PIF_VALUE: 20
PIF_VALUE: 15
PIF_VALUE: 1
PIF_VALUE: 5
PIF_VALUE: 24
PIF_VALUE: 1
PIF_VALUE: 24
PIF_VALUE: 24
PIF_VALUE: 21
PIF_VALUE: 0
PIF_VALUE: 0
PIF_VALUE: 4
PIF_VALUE: 21
PIF_VALUE: 8
PIF_VALUE: 22
PIF_VALUE: 23
PIF_VALUE: 26
PIF_VALUE: 1
PIF_VALUE: 0
PIF_VALUE: 15
PIF_VALUE: 25
PIF_VALUE: 25
PIF_VALUE: 16
PIF_VALUE: 24
PIF_VALUE: 4
PIF_VALUE: 20
PIF_VALUE: 26
PIF_VALUE: 23
PIF_VALUE: 21
PIF_VALUE: 26
PIF_VALUE: 16
PIF_VALUE: 15
PIF_VALUE: 26
PIF_VALUE: 17
PIF_VALUE: 24
PIF_VALUE: 0
PIF_VALUE: 23
PIF_VALUE: 2
PIF_VALUE: 0
PIF_VALUE: 23
PIF_VALUE: 24
PIF_VALUE: 15

## 2019-07-30 ASSESSMENT — PAIN SCALES - GENERAL: PAINLEVEL_OUTOF10: 0

## 2019-07-30 NOTE — PROGRESS NOTES
9858 Patient received in IR for IVC Filter placement  0736 This procedure has been fully reviewed with the patient and written informed consent has been obtained. 5695 Procedure started with Dr. Jefferson Martin IVC filter deployed  5610 Procedure completed; patient tolerated well. 0815 Band aid to right femoral; no bleeding noted. 9053 Patient on bed; comfort ensured. 6716 Patient taken to [phone]  via bed.

## 2019-07-30 NOTE — PROGRESS NOTES
irregularly irregular rhythm, normal S1 and S2,+murmur,   Pulmonary/Chest: bilateral basilar crackles, no wheezes, rales or rhonchi  Abdomen: soft, non-tender, non-distended, normal bowel sounds, no masses Extremities: 2+ bilateral edema  Skin: warm and dry, no rash or erythema  Head: normocephalic and atraumatic   Musculoskeletal: normal range of motion, no joint swelling, deformity or tenderness  Neurological: alert, oriented, normal speech, no focal findings or movement disorder noted    Medications:    midodrine  10 mg Oral TID WC    sodium chloride  500 mL Intravenous Once    sodium chloride flush  10 mL Intravenous 2 times per day    [Held by provider] enoxaparin  30 mg Subcutaneous Daily      sodium chloride 60 mL/hr at 19 0811       sodium chloride flush 10 mL PRN   potassium chloride 40 mEq PRN   Or     potassium alternative oral replacement 40 mEq PRN   Or     potassium chloride 10 mEq PRN   magnesium hydroxide 30 mL Daily PRN   ondansetron 4 mg Q6H PRN   acetaminophen 650 mg Q4H PRN       Diagnostics:  EK2019 21:20:44 [facility] ROUTINE RETRIEVAL  Atrial fibrillation with rapid ventricular response  Right ventricular hypertrophy  Lateral infarct , age undetermined  Abnormal ECG  When compared with ECG of 2009 09:08,  Atrial fibrillation has replaced Sinus rhythm  Ventricular rate has increased BY 46 BPM  T wave amplitude has decreased in Lateral leads  Confirmed by [facility] ESTHER LUCAS ([id number]) on 2019 5:46:43 AM    Echo:    Summary   Left Ventricular size is Moderately increased .   Normal left ventricular wall thickness.   Systolic function was moderately reduced.   Ejection fraction is visually estimated in the range of 40% to 45%.   There was moderate global hypokinesis of the left ventricle.   The left atrium is Severely dilated.  Linward Ink enlarged right atrium size.   Moderate mitral stenosis.   Mitral valve area was 1.2 cm2.   Severe mitral regurgitation with anteriorly directed jet.   There is mild-to-moderate aortic stenosis   Moderate tricuspid regurgitation visualized.   Right ventricular systolic pressure measures 50 mmhg.   There is a small posterior pericardial effusion with no evidence of   hemodynamic compromise.   There is moderate left sided pleural effusion.     EF: 40-45%    Lab Data:    Cardiac Enzymes:  Recent Labs     07/28/19  1800 07/29/19  0338   CKTOTAL 1,343* 910*       CBC:   Lab Results   Component Value Date    WBC 8.2 07/30/2019    RBC 4.04 07/30/2019    RBC 4.89 02/10/2012    HGB 12.1 07/30/2019    HCT 39.2 07/30/2019     07/30/2019       CMP:  Lab Results   Component Value Date     07/30/2019    K 5.1 07/30/2019    K 5.3 07/29/2019     07/30/2019    CO2 19 07/30/2019    BUN 59 07/30/2019    CREATININE 1.6 07/30/2019    LABGLOM 30 07/30/2019    GLUCOSE 91 07/30/2019    GLUCOSE 119 02/10/2012    CALCIUM 8.3 07/30/2019       Hepatic Function Panel:  Lab Results   Component Value Date    ALKPHOS 89 07/29/2019    ALT 97 07/29/2019     07/29/2019    PROT 6.1 07/29/2019    BILITOT 1.1 07/29/2019    BILIDIR 1.3 07/28/2019    LABALBU 3.0 07/29/2019    LABALBU 4.1 02/10/2012       Magnesium:  No results found for: MG    PT/INR:    Lab Results   Component Value Date    INR 1.54 07/28/2019       HgBA1c:  No results found for: LABA1C    FLP:  Lab Results   Component Value Date    TRIG 91 08/10/2012    HDL 47 08/10/2012    LDLCALC 150 08/10/2012       TSH:  No results found for: TSH      Assessment:    Pre-op risk assessment  Closed fx of left hip    -surgery cancelled (07/29/19) due to US findings of acute DVT in the right  femoral vein   -IVC filter this morning (07/30/19): plans for surgery later today    Acute on chronic systolic CHF   -Echo 84/28/61    -EF 40-45%    -severely dilated LA    -Moderate MS, severe MR    -Mild to moderate AS   Elevated troponin: 0.128/0.101 - denies cp or sob  Afib with CVR    -Hx of chronic afib    -[id number]: at least 5   -pt and son refuse OAC  Hypotension : currently 96/60 - on midodrine  MAG   Elevated LFTs  HTN  [id number]    Plan:  · Continue midodrine  · Pt very high risk for OR - pt and surgeon to decide  · ICU post op  · Keep sbp >100, HR <100  · Will need diuretics post op         Electronically signed by Carmelo Gao PA-C on 7/30/2019 at 8:40 AM

## 2019-07-30 NOTE — OP NOTE
[facility]  Post-Operative Note    Patient Name:  Davidson Sanchez  MRN:  [id number] YOB: 1924  Admission Date:  7/28/2019    Surgery Date:  7/30/2019    Pre-operative Diagnosis: Closed displaced femoral neck fx Left      Post-operative Diagnosis: Same    Procedure: open tx femoral neck fracture with a prosthesis cemented (15567)     Surgeon: Ele Campoverde MD    Assistants:   0    Anesthesia: General    Estimated Blood Loss:  318 ml    Complications:  None    Specimens: 0    Components Used: Conquest fx stem cemented 12  Unipolar head 47mm with +4 neck length. INDICATION FOR PROCEDURE     The patient is an 80y.o.-year-old with a history of a fall. Patient complained of Left   hip pain. The patient has multiple medical comorbidities and was admitted with a closed displaced femoral neck fracture. Hospitalist was consulted for medical clearance. They felt patient to be of acceptable risk. Discussed with patient and elected to proceed.     NARRATIVE:     PROCEDURE DETAILS     The patient was taken to the operating room, underwent a general anesthetic, placed in lateral position, fracture side up. Care was taken to pad all bony prominences. Timeout was taken, consent was confirmed. Started with a lateral approach to the hip with skin knife followed by electrocautery down to the iliotibial band. The iliotibial band was then split. Charnley C retractor was put in position and took down the anterior third of the gluteus medius tendon. Placed the leg in a figure-of-four, made a cut about a fingerbreadth above the lesser trochanter. We removed the remaining head and neck. Sized the acetabulum to be a size 47. We then used the  followed by the canal finder. We lateralized, reamed a bit, reamed up to a size 13 and broached to the same and implanted a size 12 cemented stem. Once stable we trialed head and neck lengths, elected to go with a +4 neck length.  It was stable

## 2019-07-30 NOTE — CARE COORDINATION
7/30/19, 2:33 PM      [facility] day: 2  Location: [facility] OR (General) POOL R* Reason for admit: Hip fracture, right, closed, initial encounter ([facility] [address] Chiki Drummond   Procedure: 7/30 IVC filter placed. 7/30 - open tx femoral neck fracture with a prosthesis cemented. Treatment Plan of Care: Hospitalist, Ortho and Cardiology following. Pt was found to have DVT via venous doppler, had IVC filter placed. Taken to surgery today for hip repair. PCP: WALTER Rock CNP  Readmission Risk Score: 13%  Discharge Plan: Pt and son would like pt to go to TCU or Rehab when medically ready. Pt is from home alone. Will monitor post procedure.    Electronically signed by Tammy Thayer RN on 7/30/2019 at 2:39 PM

## 2019-07-31 ENCOUNTER — APPOINTMENT (OUTPATIENT)
Dept: ULTRASOUND IMAGING | Age: 84
DRG: 469 | End: 2019-07-31
Payer: MEDICARE

## 2019-07-31 ENCOUNTER — APPOINTMENT (OUTPATIENT)
Dept: GENERAL RADIOLOGY | Age: 84
DRG: 469 | End: 2019-07-31
Payer: MEDICARE

## 2019-07-31 LAB
ALBUMIN SERPL-MCNC: 2.8 G/DL (ref 3.5–5.1)
AMORPHOUS: ABNORMAL
ANION GAP SERPL CALCULATED.3IONS-SCNC: 14 MEQ/L (ref 8–16)
ANION GAP SERPL CALCULATED.3IONS-SCNC: 16 MEQ/L (ref 8–16)
BACTERIA: ABNORMAL
BASOPHILS # BLD: 0 %
BASOPHILS ABSOLUTE: 0 THOU/MM3 (ref 0–0.1)
BILIRUBIN URINE: NEGATIVE
BLOOD, URINE: ABNORMAL
BUN BLDV-MCNC: 61 MG/DL (ref 7–22)
BUN BLDV-MCNC: 62 MG/DL (ref 7–22)
CALCIUM SERPL-MCNC: 8.2 MG/DL (ref 8.5–10.5)
CALCIUM SERPL-MCNC: 8.2 MG/DL (ref 8.5–10.5)
CASTS: ABNORMAL /LPF
CHARACTER, URINE: ABNORMAL
CHLORIDE BLD-SCNC: 105 MEQ/L (ref 98–111)
CHLORIDE BLD-SCNC: 105 MEQ/L (ref 98–111)
CHLORIDE, URINE: < 20 MEQ/L
CO2: 19 MEQ/L (ref 23–33)
CO2: 20 MEQ/L (ref 23–33)
COLOR: ABNORMAL
CREAT SERPL-MCNC: 1.9 MG/DL (ref 0.4–1.2)
CREAT SERPL-MCNC: 2 MG/DL (ref 0.4–1.2)
CREATININE URINE: 148 MG/DL
CRYSTALS: ABNORMAL
DIGOXIN LEVEL: 0.9 NG/ML (ref 0.5–2)
EKG ATRIAL RATE: 65 BPM
EKG Q-T INTERVAL: 346 MS
EKG QRS DURATION: 92 MS
EKG QTC CALCULATION (BAZETT): 406 MS
EKG R AXIS: 122 DEGREES
EKG T AXIS: -19 DEGREES
EKG VENTRICULAR RATE: 83 BPM
EOSINOPHIL # BLD: 1.7 %
EOSINOPHIL SMEAR: NORMAL
EOSINOPHILS ABSOLUTE: 0.1 THOU/MM3 (ref 0–0.4)
EPITHELIAL CELLS, UA: ABNORMAL /HPF
ERYTHROCYTE [DISTWIDTH] IN BLOOD BY AUTOMATED COUNT: 17.1 % (ref 11.5–14.5)
ERYTHROCYTE [DISTWIDTH] IN BLOOD BY AUTOMATED COUNT: 61.9 FL (ref 35–45)
GFR SERPL CREATININE-BSD FRML MDRD: 23 ML/MIN/1.73M2
GFR SERPL CREATININE-BSD FRML MDRD: 25 ML/MIN/1.73M2
GLUCOSE BLD-MCNC: 100 MG/DL (ref 70–108)
GLUCOSE BLD-MCNC: 77 MG/DL (ref 70–108)
GLUCOSE, URINE: NEGATIVE MG/DL
HCT VFR BLD CALC: 40.6 % (ref 37–47)
HEMOGLOBIN: 12.1 GM/DL (ref 12–16)
IMMATURE GRANS (ABS): 0.01 THOU/MM3 (ref 0–0.07)
IMMATURE GRANULOCYTES: 0.1 %
KETONES, URINE: NEGATIVE
LEUKOCYTE ESTERASE, URINE: ABNORMAL
LYMPHOCYTES # BLD: 10.3 %
LYMPHOCYTES ABSOLUTE: 0.7 THOU/MM3 (ref 1–4.8)
MCH RBC QN AUTO: 30 PG (ref 26–33)
MCHC RBC AUTO-ENTMCNC: 29.8 GM/DL (ref 32.2–35.5)
MCV RBC AUTO: 100.5 FL (ref 81–99)
MONOCYTES # BLD: 7.9 %
MONOCYTES ABSOLUTE: 0.6 THOU/MM3 (ref 0.4–1.3)
MUCUS: ABNORMAL
NITRITE, URINE: NEGATIVE
NUCLEATED RED BLOOD CELLS: 0 /100 WBC
PH UA: 5.5 (ref 5–9)
PLATELET # BLD: 98 THOU/MM3 (ref 130–400)
PMV BLD AUTO: 11.9 FL (ref 9.4–12.4)
POTASSIUM REFLEX MAGNESIUM: 5 MEQ/L (ref 3.5–5.2)
POTASSIUM SERPL-SCNC: 5 MEQ/L (ref 3.5–5.2)
POTASSIUM SERPL-SCNC: 5.7 MEQ/L (ref 3.5–5.2)
POTASSIUM, URINE: 60.7 MEQ/L
PROT/CREAT RATIO, UR: 0.4
PROTEIN UA: 30 MG/DL
PROTEIN, URINE: 58.6 MG/DL
RBC # BLD: 4.04 MILL/MM3 (ref 4.2–5.4)
RBC URINE: ABNORMAL /HPF
SEG NEUTROPHILS: 80 %
SEGMENTED NEUTROPHILS ABSOLUTE COUNT: 5.7 THOU/MM3 (ref 1.8–7.7)
SODIUM BLD-SCNC: 138 MEQ/L (ref 135–145)
SODIUM BLD-SCNC: 141 MEQ/L (ref 135–145)
SODIUM URINE: < 20 MEQ/L
SPECIFIC GRAVITY UA: 1.03 (ref 1–1.03)
SPECIMEN: NORMAL
TOTAL CK: 265 U/L (ref 30–135)
UROBILINOGEN, URINE: 1 EU/DL (ref 0–1)
WBC # BLD: 7.1 THOU/MM3 (ref 4.8–10.8)
WBC UA: ABNORMAL /HPF

## 2019-07-31 PROCEDURE — 97110 THERAPEUTIC EXERCISES: CPT

## 2019-07-31 PROCEDURE — 2580000003 HC RX 258: Performed by: INTERNAL MEDICINE

## 2019-07-31 PROCEDURE — 2140000000 HC CCU INTERMEDIATE R&B

## 2019-07-31 PROCEDURE — 71045 X-RAY EXAM CHEST 1 VIEW: CPT

## 2019-07-31 PROCEDURE — 6360000002 HC RX W HCPCS: Performed by: INTERNAL MEDICINE

## 2019-07-31 PROCEDURE — 99232 SBSQ HOSP IP/OBS MODERATE 35: CPT | Performed by: NURSE PRACTITIONER

## 2019-07-31 PROCEDURE — 2580000003 HC RX 258: Performed by: NURSE PRACTITIONER

## 2019-07-31 PROCEDURE — 84300 ASSAY OF URINE SODIUM: CPT

## 2019-07-31 PROCEDURE — 93010 ELECTROCARDIOGRAM REPORT: CPT | Performed by: INTERNAL MEDICINE

## 2019-07-31 PROCEDURE — 80162 ASSAY OF DIGOXIN TOTAL: CPT

## 2019-07-31 PROCEDURE — 6360000002 HC RX W HCPCS: Performed by: PHYSICIAN ASSISTANT

## 2019-07-31 PROCEDURE — 93005 ELECTROCARDIOGRAM TRACING: CPT | Performed by: INTERNAL MEDICINE

## 2019-07-31 PROCEDURE — 82550 ASSAY OF CK (CPK): CPT

## 2019-07-31 PROCEDURE — 2709999900 HC NON-CHARGEABLE SUPPLY

## 2019-07-31 PROCEDURE — 82436 ASSAY OF URINE CHLORIDE: CPT

## 2019-07-31 PROCEDURE — 99222 1ST HOSP IP/OBS MODERATE 55: CPT | Performed by: INTERNAL MEDICINE

## 2019-07-31 PROCEDURE — 82040 ASSAY OF SERUM ALBUMIN: CPT

## 2019-07-31 PROCEDURE — 82570 ASSAY OF URINE CREATININE: CPT

## 2019-07-31 PROCEDURE — P9047 ALBUMIN (HUMAN), 25%, 50ML: HCPCS | Performed by: INTERNAL MEDICINE

## 2019-07-31 PROCEDURE — 2700000000 HC OXYGEN THERAPY PER DAY

## 2019-07-31 PROCEDURE — 99233 SBSQ HOSP IP/OBS HIGH 50: CPT | Performed by: INTERNAL MEDICINE

## 2019-07-31 PROCEDURE — 89190 NASAL SMEAR FOR EOSINOPHILS: CPT

## 2019-07-31 PROCEDURE — 84133 ASSAY OF URINE POTASSIUM: CPT

## 2019-07-31 PROCEDURE — 85025 COMPLETE CBC W/AUTO DIFF WBC: CPT

## 2019-07-31 PROCEDURE — 97530 THERAPEUTIC ACTIVITIES: CPT

## 2019-07-31 PROCEDURE — 80051 ELECTROLYTE PANEL: CPT

## 2019-07-31 PROCEDURE — 80048 BASIC METABOLIC PNL TOTAL CA: CPT

## 2019-07-31 PROCEDURE — 76770 US EXAM ABDO BACK WALL COMP: CPT

## 2019-07-31 PROCEDURE — 81001 URINALYSIS AUTO W/SCOPE: CPT

## 2019-07-31 PROCEDURE — 36415 COLL VENOUS BLD VENIPUNCTURE: CPT

## 2019-07-31 PROCEDURE — 6370000000 HC RX 637 (ALT 250 FOR IP): Performed by: NURSE PRACTITIONER

## 2019-07-31 PROCEDURE — 6360000002 HC RX W HCPCS: Performed by: NURSE PRACTITIONER

## 2019-07-31 PROCEDURE — 97165 OT EVAL LOW COMPLEX 30 MIN: CPT

## 2019-07-31 PROCEDURE — 94761 N-INVAS EAR/PLS OXIMETRY MLT: CPT

## 2019-07-31 PROCEDURE — 84156 ASSAY OF PROTEIN URINE: CPT

## 2019-07-31 PROCEDURE — 6370000000 HC RX 637 (ALT 250 FOR IP): Performed by: INTERNAL MEDICINE

## 2019-07-31 PROCEDURE — 97162 PT EVAL MOD COMPLEX 30 MIN: CPT

## 2019-07-31 PROCEDURE — P9047 ALBUMIN (HUMAN), 25%, 50ML: HCPCS | Performed by: PHYSICIAN ASSISTANT

## 2019-07-31 PROCEDURE — 97116 GAIT TRAINING THERAPY: CPT

## 2019-07-31 RX ORDER — SODIUM POLYSTYRENE SULFONATE 4.1 MEQ/G
15 POWDER, FOR SUSPENSION ORAL; RECTAL ONCE
Status: COMPLETED | OUTPATIENT
Start: 2019-07-31 | End: 2019-07-31

## 2019-07-31 RX ORDER — ALBUMIN (HUMAN) 12.5 G/50ML
25 SOLUTION INTRAVENOUS EVERY 8 HOURS
Status: COMPLETED | OUTPATIENT
Start: 2019-07-31 | End: 2019-08-01

## 2019-07-31 RX ORDER — DEXTROSE MONOHYDRATE 25 G/50ML
25 INJECTION, SOLUTION INTRAVENOUS ONCE
Status: COMPLETED | OUTPATIENT
Start: 2019-07-31 | End: 2019-07-31

## 2019-07-31 RX ORDER — ALBUMIN (HUMAN) 12.5 G/50ML
25 SOLUTION INTRAVENOUS ONCE
Status: COMPLETED | OUTPATIENT
Start: 2019-07-31 | End: 2019-07-31

## 2019-07-31 RX ORDER — DEXTROSE MONOHYDRATE 50 MG/ML
100 INJECTION, SOLUTION INTRAVENOUS PRN
Status: DISCONTINUED | OUTPATIENT
Start: 2019-07-31 | End: 2019-08-06 | Stop reason: HOSPADM

## 2019-07-31 RX ORDER — HYDROCODONE BITARTRATE AND ACETAMINOPHEN 5; 325 MG/1; MG/1
1 TABLET ORAL EVERY 6 HOURS PRN
Status: DISCONTINUED | OUTPATIENT
Start: 2019-07-31 | End: 2019-08-06 | Stop reason: HOSPADM

## 2019-07-31 RX ORDER — NICOTINE POLACRILEX 4 MG
15 LOZENGE BUCCAL PRN
Status: DISCONTINUED | OUTPATIENT
Start: 2019-07-31 | End: 2019-08-06 | Stop reason: HOSPADM

## 2019-07-31 RX ORDER — DEXTROSE MONOHYDRATE 25 G/50ML
12.5 INJECTION, SOLUTION INTRAVENOUS PRN
Status: DISCONTINUED | OUTPATIENT
Start: 2019-07-31 | End: 2019-08-06 | Stop reason: HOSPADM

## 2019-07-31 RX ORDER — DOBUTAMINE HYDROCHLORIDE 200 MG/100ML
2.5 INJECTION INTRAVENOUS CONTINUOUS
Status: DISCONTINUED | OUTPATIENT
Start: 2019-07-31 | End: 2019-08-03

## 2019-07-31 RX ORDER — HYDROCODONE BITARTRATE AND ACETAMINOPHEN 5; 325 MG/1; MG/1
0.5 TABLET ORAL EVERY 6 HOURS PRN
Status: DISCONTINUED | OUTPATIENT
Start: 2019-07-31 | End: 2019-08-06 | Stop reason: HOSPADM

## 2019-07-31 RX ADMIN — INSULIN HUMAN 7 UNITS: 100 INJECTION, SOLUTION PARENTERAL at 09:03

## 2019-07-31 RX ADMIN — MIDODRINE HYDROCHLORIDE 10 MG: 10 TABLET ORAL at 13:13

## 2019-07-31 RX ADMIN — ENOXAPARIN SODIUM 30 MG: 30 INJECTION SUBCUTANEOUS at 09:06

## 2019-07-31 RX ADMIN — MIDODRINE HYDROCHLORIDE 10 MG: 10 TABLET ORAL at 17:11

## 2019-07-31 RX ADMIN — DEXTROSE MONOHYDRATE 25 G: 25 INJECTION, SOLUTION INTRAVENOUS at 10:00

## 2019-07-31 RX ADMIN — ALBUMIN (HUMAN) 25 G: 0.25 INJECTION, SOLUTION INTRAVENOUS at 05:26

## 2019-07-31 RX ADMIN — SODIUM POLYSTYRENE SULFONATE 15 G: 1 POWDER ORAL; RECTAL at 09:01

## 2019-07-31 RX ADMIN — MIDODRINE HYDROCHLORIDE 10 MG: 10 TABLET ORAL at 09:05

## 2019-07-31 RX ADMIN — ALBUMIN (HUMAN) 25 G: 0.25 INJECTION, SOLUTION INTRAVENOUS at 15:52

## 2019-07-31 RX ADMIN — ALBUMIN (HUMAN) 25 G: 0.25 INJECTION, SOLUTION INTRAVENOUS at 22:26

## 2019-07-31 RX ADMIN — DOBUTAMINE HYDROCHLORIDE 2.5 MCG/KG/MIN: 200 INJECTION INTRAVENOUS at 13:03

## 2019-07-31 RX ADMIN — DEXTROSE MONOHYDRATE 2 G: 50 INJECTION, SOLUTION INTRAVENOUS at 00:17

## 2019-07-31 RX ADMIN — Medication 10 ML: at 22:28

## 2019-07-31 RX ADMIN — SODIUM CHLORIDE: 9 INJECTION, SOLUTION INTRAVENOUS at 02:19

## 2019-07-31 ASSESSMENT — PAIN SCALES - GENERAL
PAINLEVEL_OUTOF10: 0
PAINLEVEL_OUTOF10: 1
PAINLEVEL_OUTOF10: 0

## 2019-07-31 ASSESSMENT — PAIN DESCRIPTION - ORIENTATION: ORIENTATION: LEFT

## 2019-07-31 ASSESSMENT — PAIN DESCRIPTION - LOCATION: LOCATION: LEG

## 2019-07-31 ASSESSMENT — PAIN DESCRIPTION - PAIN TYPE: TYPE: SURGICAL PAIN

## 2019-07-31 NOTE — CONSULTS
Inpatient consult to Cardiology  Consult performed by: Indira Salinas MD  Consult ordered by: Britney Ruggiero DO          Elevated troponin  atr fib - chronic  Leg edema +3  HIP fracture  chf acute  Pansystolic murmur  Low normal BP    Plan    Echo  Midodrine 10 tid  Very High risk for any noncardiac surgery    [id number]    Indira Salinas MD
and  Lisinopril. PHYSICAL EXAMINATION:  GENERAL:  Looks sick, emaciated. VITALS SIGNS:  Blood pressure 90/60, pulse rate 85, respiratory rate 18,  temperature 97.8. HEENT:  Pink conjunctivae. Anicteric sclerae. Pupils are equal and  reactive bilateral to light. NECK:  No lymphadenopathy, no goiter, but engorged neck veins with  increased JVD. CHEST:  She has bilateral basal crackles. CARDIOVASCULAR:  Arteries are felt; carotid, femoral.  S1, S2 well  heard. There is a pansystolic murmur best heard in the mitral area,  grade 5/6. No galloped rhythm. ABDOMEN:  Soft. Colostomy bag noted. Bowel sounds are positive. GENITALIA:  No CVA, flank or suprapubic tenderness. LOCOMOTOR:  Bilateral pedal and pretibial edema of +3 marked. SKIN:  No rash or pallor noted. CNS:  Alert, awake, oriented to time, person and place. Cranial nerves  intact. Sensation is intact to touch and pain. Motor:  Normal muscle  strength and tone. Appropriate mood and affect. WORKUP:  The patient is in atrial fibrillation with rapid ventricular  response at times, but now in the rate of around 80s. Evidence of right  ventricular hypertrophy, lateral infarction old has been noted. Nonspecific ST-segment changes have been noted. No previous EKG to compare it. Sodium 141, potassium 5.3, BUN 53, creatinine 1.9. BUN and creatinine  unchanged from yesterday on admission. Troponin on admission 0.143,  0.12, 0.1. AST elevated 148, ALT 97, and albumin is 3. Total protein  6.1. Hematology:  White blood cells 7.7, hemoglobin 12.7, and platelets  98. INR 1.5. BNP on admission 1,343 and the repeat BNP today is 910. Chest x-ray on admission includes bronchovascular marking consistent  with interstitial pulmonary congestion and chest x-ray today basically  unchanged. X-ray of the right hip moderately displaced subcapital  fracture of the left femur neck.     Renal function used to be normal before and like in 2014, BUN
at noon  Stop IV fluids due to edema. Will give IV Albumin  May benefit from Dobutamine  Avoid nephrotoxic agents    Thank you for allowing me to participate in the care of this patient. Please feel free to call me if you have any questions.      Electronically signed by Cady Thornton DO on 7/31/19 at 9:05 AM    [facility]

## 2019-07-31 NOTE — PROGRESS NOTES
Pharmacy Medication History Note      List of current medications patient is taking is complete. Source of information: patient    Changes made to medication list:  No changes made    Other notes (ex. Recent course of antibiotics, Coumadin dosing):  Denies use of other OTC or herbal medications.       Allergies reviewed      Electronically signed by Rosa Sharma on 7/31/2019 at 10:26 AM

## 2019-07-31 NOTE — PROGRESS NOTES
thighs, pulses palpable   Skin: warm and dry, no rash or erythema  Head: normocephalic and atraumatic  Eyes: pupils equal, round, and reactive to light  Neck: supple and non-tender without mass, no thyromegaly   Musculoskeletal: normal range of motion, no joint swelling, deformity or tenderness  Neurological: alert, oriented, normal speech, no focal findings or movement disorder noted    Medications:    enoxaparin  30 mg Subcutaneous Daily    midodrine  10 mg Oral TID WC    sodium chloride  500 mL Intravenous Once    sodium chloride flush  10 mL Intravenous 2 times per day      dextrose         glucose 15 g PRN   dextrose 12.5 g PRN   glucagon (rDNA) 1 mg PRN   dextrose 100 mL/hr PRN   HYDROcodone 5 mg - acetaminophen 0.5 tablet Q6H PRN   Or     HYDROcodone 5 mg - acetaminophen 1 tablet Q6H PRN   morphine 1 mg Q2H PRN   Or     morphine 2 mg Q2H PRN   sodium chloride flush 10 mL PRN   potassium chloride 40 mEq PRN   Or     potassium alternative oral replacement 40 mEq PRN   Or     potassium chloride 10 mEq PRN   magnesium hydroxide 30 mL Daily PRN   ondansetron 4 mg Q6H PRN   acetaminophen 650 mg Q4H PRN       Diagnostics:  EK19  Atrial fibrillation with rapid ventricular response  Right ventricular hypertrophy  Lateral infarct , age undetermined  Abnormal ECG  When compared with ECG of 2009 09:08,  Atrial fibrillation has replaced Sinus rhythm  Ventricular rate has increased BY  46 BPM  T wave amplitude has decreased in Lateral leads  Confirmed by [facility] Sulema LUCAS ([id number]) on 2019 5:46:43 AM    Echo: 19  Summary   Left Ventricular size is Moderately increased .   Normal left ventricular wall thickness.   Systolic function was moderately reduced.   Ejection fraction is visually estimated in the range of 40% to 45%.   There was moderate global hypokinesis of the left ventricle.   The left atrium is Severely dilated.   Markedly enlarged right atrium size.   Moderate mitral stenosis.   Mitral valve area was 1.2 cm2.   Severe mitral regurgitation with anteriorly directed jet.   There is mild-to-moderate aortic stenosis   Moderate tricuspid regurgitation visualized.   Right ventricular systolic pressure measures 50 mmhg.   There is a small posterior pericardial effusion with no evidence of   hemodynamic compromise.   There is moderate left sided pleural effusion.      Signature      ----------------------------------------------------------------   Electronically signed by Coby Lim MD       Lab Data:    Cardiac Enzymes:  Recent Labs     07/28/19  1800 07/29/19  0338 07/31/19  0407   CKTOTAL 1,343* 910* 265*       CBC:   Lab Results   Component Value Date    WBC 7.1 07/31/2019    RBC 4.04 07/31/2019    RBC 4.89 02/10/2012    HGB 12.1 07/31/2019    HCT 40.6 07/31/2019    PLT 98 07/31/2019       CMP:  Lab Results   Component Value Date     07/31/2019    K 5.7 07/31/2019    K 5.3 07/29/2019     07/31/2019    CO2 19 07/31/2019    BUN 62 07/31/2019    CREATININE 2.0 07/31/2019    LABGLOM 23 07/31/2019    GLUCOSE 100 07/31/2019    GLUCOSE 119 02/10/2012    CALCIUM 8.2 07/31/2019       Hepatic Function Panel:  Lab Results   Component Value Date    ALKPHOS 89 07/29/2019    ALT 97 07/29/2019     07/29/2019    PROT 6.1 07/29/2019    BILITOT 1.1 07/29/2019    BILIDIR 1.3 07/28/2019    LABALBU 2.8 07/31/2019    LABALBU 4.1 02/10/2012       Magnesium:  No results found for: MG    PT/INR:    Lab Results   Component Value Date    INR 1.54 07/28/2019       HgBA1c:  No results found for: LABA1C    FLP:  Lab Results   Component Value Date    TRIG 91 08/10/2012    HDL 47 08/10/2012    LDLCALC 150 08/10/2012       TSH:  No results found for: TSH      Assessment:  · S/P Left Hip fracture repair 7/30/19:Ortho following  · Acute on chronic systolic CHF  · Echo: EF 11-20 per echo 7/28/19  · Moderate MS, severe MR, mild to mod AS  · Hyperkalemia: nephrology managing  · AFib with cvr- has history of chronic AFib   [id number] score at least 5   Continues to refuse [address]  · Hypotension: on midodrine  · MAG: nephrology following  · LLE DVT: S/P IVC filter 7/30/19  · Elevated LFT's  · H/o: HTN  · DNR-CCA          Plan:  · Diuresis per nephrology  · Agree with Dobutamine, start at 2.5 mcg/kg/min-monitor for arrhythmias  · Primary to initiate, manage central line for infusion. · No ACEi / ARB d/t MAG  Daily weight, I&O  · 2 g sodium, 2 L fluid restriction  · Keep K+ > 4, magnesium > 2--Replace prn  · Continue other current medical therapy.          Electronically signed by WALTER Dillon CNP on 7/31/2019 at 11:09 AM

## 2019-07-31 NOTE — PROGRESS NOTES
Consider ischemic eval in future. 6. Borderline bp- started on midodrine. May be able to titrate down/off since starting on dobutaminel   7. Acute hypoxic respiratory failure due to acute systolic chf - continue supplemental O2, wean as tolerated (satting well), and see above for HF exacerbation. 8. MAG - likely component of CRS/renal congestion and from hypotension, contrast use, and NSAIDs. Worsening creatinine today, consult Nephrology. 9. Hyperkalemia - likely related to renal insufficiency. Treat with insulin, kayexelate (dextrose and monitor glucose post insulin). Improved from 5.7 -> 5.0 on repeat. Low K diet. Repeat BMP in am.   10. Hematuria - noted on UA, likely from king, will need to repeat in future after removal.   11. King catheter - will attempt to remove (had post surgery), monitor for signs of retention  12. Elevated CK - down trending, not looking like rhabdo  13. Transaminase elevation - with elevated bilirubin on arrival - ddx is shock liver from hypotension vs congestive hepatopathy. Repeat in am.    14. Thrombocytopenia - will monitor labs, no other cytopenias noted. Did not receive any heparinoid products on first 2 days hospitalization  15. Acute Right femoral vein dvt and chronic left common femoral vein DVT - s/p filter 7/30 prior to surgical repair. 16. Physical Deconditioning/Fall PTA - PT/OT      Expected discharge date:  oending    Disposition: likely rehab vs SNF         [] Home       [] TCU       [] Rehab       [] Psych       [] SNF       [] [place]       [] Other-    --------------------------------------------    Chief Complaint: Left hip pain, weakness. Hospital Course: Patient admitted for left hip pain found to have left hip fracture, s/p operative repair on 7/30 with Ortho. Has multiple other issues as well - see above for more information. Subjective (past 24 hours):  Left hip doing well, denies any numbness or tingling.   Was able to stand up with therapy this am, no worsening of pain. Denies any LH or dizziness. No chest pain or palpitations. No fevers chills. Medications:  Reviewed    Infusion Medications    dextrose      sodium chloride 40 mL/hr at 07/31/19 0219     Scheduled Medications    sodium polystyrene  15 g Oral Once    insulin regular  7 Units Intravenous Once    And    dextrose  25 g Intravenous Once    enoxaparin  30 mg Subcutaneous Daily    midodrine  10 mg Oral TID WC    sodium chloride  500 mL Intravenous Once    sodium chloride flush  10 mL Intravenous 2 times per day     PRN Meds: glucose, dextrose, glucagon (rDNA), dextrose, HYDROcodone 5 mg - acetaminophen **OR** HYDROcodone 5 mg - acetaminophen, morphine **OR** morphine, sodium chloride flush, potassium chloride **OR** potassium alternative oral replacement **OR** potassium chloride, magnesium hydroxide, ondansetron, acetaminophen      Intake/Output Summary (Last 24 hours) at 7/31/2019 0658  Last data filed at 7/31/2019 0354  Gross per 24 hour   Intake 1037.72 ml   Output 350 ml   Net 687.72 ml     Weight change:       Exam:  /64   Pulse 96   Temp 97.6 °F (36.4 °C) (Axillary)   Resp 20   Ht 5' 5\" (1.651 m)   Wt 152 lb (68.9 kg)   SpO2 94%   BMI 25.29 kg/m²     General appearance: No apparent distress, well developed, appears stated age, elderly pleasant and [place]. Eyes:  Pupils equal, round, and reactive to light. Conjunctivae/corneas clear. HENT: Head normal in appearance. External nares normal.  Oral mucosa moist without lesions. Neck: Supple, with full range of motion. +jvd. Trachea midline. Respiratory:  Normal respiratory effort. Bibasilar rales appreciated. Cardiovascular: Normal rate, regular rhythm with normal S1/S2 with 3/6 LUCIANA. 3+ BLLE edema (slightly worse in left)  Abdomen: Soft, non-tender, non-distended with normal bowel sounds. Musculoskeletal: Limited ROM in left leg.   T  Skin: Skin color, texture, turgor normal.  No 2:57 PM      IR GUIDED IVC FILTER PLACEMENT   Final Result   1. Status post successful IVC filter insertion. 2. Patient will be scheduled for routine followup visit in approximately 3 months to discuss the option for filter retrieval.            **This report has been created using voice recognition software. It may contain minor errors which are inherent in voice recognition technology. **      Final report electronically signed by Dr Tara Hannon on 7/30/2019 8:34 AM      VL DUP LOWER EXTREMITY VENOUS BILATERAL   Final Result   Findings are consistent with acute DVT in the right femoral vein. Possible recanalized chronic DVT left common femoral vein               **This report has been created using voice recognition software. It may contain minor errors which are inherent in voice recognition technology. **      Final report electronically signed by Dr. Aviva Deluna on 7/29/2019 3:57 PM      XR CHEST PORTABLE   Final Result   Cardiomegaly and diffuse increased interstitial markings may be chronic. No confluent infiltrate or pleural effusion is seen. **This report has been created using voice recognition software. It may contain minor errors which are inherent in voice recognition technology. **      Final report electronically signed by Dr. Aviva Deluna on 7/29/2019 10:09 AM      XR HIP 2-3 VW W PELVIS LEFT   Final Result   Moderately displaced subcapital fracture left femoral neck. Questionable mild comminution. **This report has been created using voice recognition software. It may contain minor errors which are inherent in voice recognition technology. **      Final report electronically signed by Dr. Anabelle Smith on 7/28/2019 5:53 PM      XR CHEST PORTABLE   Final Result   1. Moderate cardiomegaly. Mild prominence of the central pulmonary vessels, suggesting heart failure.    2. Increased interstitial markings throughout both lungs, consistent with interstitial pneumonia/pulmonary

## 2019-07-31 NOTE — FLOWSHEET NOTE
07/31/19 0422   Provider Notification   Reason for Communication Evaluate   Provider Name [facility]    Provider Notification Physician Assistant   Method of Communication Secure Message   Response Waiting for response   Notification Time 0422     0422:   79 yo pt is post op day 1 for fractured L hip repair. 0.9 @ 40%. She has 4+ pitting edema in bilat legs and 2+ edema in L arm. Lungs clear. She has a king and this shift she's had only 50mLs out. Inquired about protein shot.

## 2019-07-31 NOTE — PROGRESS NOTES
Initiated: Treatment and education initiated within context of evaluation. Evaluation time included review of current medical information, gathering information related to past medical, social and functional history, completion of standardized testing, formal and informal observation of tasks, assessment of data and development of plan of care and goals. Treatment time included skilled education and facilitation of tasks to increase safety and independence with ADL's for improved functional independence and quality of life. Discharge Recommendations:  (TCU vs SNF pending progress)    Patient Education:  OT Education: OT Role, Plan of Care, Precautions, ADL Adaptive Strategies, Transfer Training  Barriers to Learning: [facility]    Equipment Recommendations:  Equipment Needed: Yes  Other: RW, LH AE, ETS    Plan:  Times per week: 6x  Current Treatment Recommendations: Balance Training, Self-Care / ADL, Functional Mobility Training, Patient/Caregiver Education & Training, Endurance Training, Safety Education & Training    Goals:  Patient goals : get better  Short term goals  Time Frame for Short term goals: 2 weeks  Short term goal 1: Complete sit-stand with CGA & min vcs for technique with THR precautions  Short term goal 2: Complete mobility to/from bathroom with CGA, RW, & min vcs for walker safety  Short term goal 3: Tolerate standing 3-4 min with CGA for increased ease of sinkside grooming  Short term goal 4: Complete LE dressing with mod A, LH AE, & min vcs for THR precautions  Long term goals  Time Frame for Long term goals : No LTG set d/t short ELOS    Following session, patient left in safe position with all fall risk precautions in place.

## 2019-07-31 NOTE — PROGRESS NOTES
distance    Hearing: Exceptions to [facility]  Hearing Exceptions: Hard of hearing/hearing concerns         Pain:  Yes. Pain Assessment  Pain Assessment: 0-10  Pain Level: 1  Pain Type: Surgical pain  Pain Location: Leg  Pain Orientation: Left       Social/Functional History:    Lives With: Alone  Type of Home: House  Home Layout: One level, Laundry in basement, Work area in basement  Home Access: Stairs to enter with rails  Entrance Stairs - Number of Steps: 2 steps with a rail  Home Equipment: Rolling walker          Receives Help From: Family        Ambulation Assistance: Independent  Transfer Assistance: Independent    Active : Yes  Mode of Transportation: Car     Additional Comments: Pt was generally quite independent and would exercise on treadmill frequently. OBJECTIVE:  Range of Motion:  Right Lower Extremity: WFL  Left Lower Extremity: Impaired - decreased due to some pain and edema    Strength:  Right Lower Extremity: WFL  Left Lower Extremity: Impaired - grossly 3/5 at knee and 2+/5 at hip    Balance:  Static Sitting Balance:  Modified Independent  Dynamic Sitting Balance: Supervision  Static Standing Balance: Contact Guard Assistance, with walker for support  Dynamic Standing Balance: Contact Guard Assistance, with walker for support    Bed Mobility:  Supine to Sit: Minimal Assistance, Moderate Assistance    Transfers:  Sit to Stand: Minimal Assistance, X 1  Stand to Sit:Contact [employer] Assistance    Ambulation:  Contact Guard Assistance  Distance: 30 ft  Surface: Level Tile  Device:Rolling Walker  Gait Deviations: Forward Flexed Posture, Slow Tala, Decreased Step Length Bilaterally, Decreased Gait Speed, Decreased Heel Strike Bilaterally, Narrow Base of Support and Unsteady Gait    Exercise:  Patient was guided in 1 set(s) 10 reps of exercise to both lower extremities. Ankle pumps, Glut sets, Quad sets and Long arc quads.   Exercises were completed for increased independence with functional mobility. Functional Outcome Measures: Completed  AM-PAC Inpatient Mobility without Stair Climbing Raw Score : 14  AM-PAC Inpatient without Stair Climbing T-Scale Score : 40.85    ASSESSMENT:  Activity Tolerance:  Patient tolerance of  treatment: good. Pt is motivated to get moving again      Treatment Initiated: Treatment and education initiated within context of evaluation. Evaluation time included review of current medical information, gathering information related to past medical, social and functional history, completion of standardized testing, formal and informal observation of tasks, assessment of data and development of plan of care and goals. Treatment time included skilled education and facilitation of tasks to increase safety and independence with functional mobility for improved independence and quality of life. Assessment: Body structures, Functions, Activity limitations: Decreased functional mobility , Decreased balance, Decreased ROM, Decreased strength  Assessment: Pt is a 80 y.o. female that was very independent and ambulated without AD prior to injury. Pt is requiring Min to Mod A for bed mobility and CGA for transfers and ambulation. Pt would benefit from continued skilled PT to addres strengthening, transfers, and gait training for improved independence with functional mobility before returning to home alone. Prognosis: Excellent    REQUIRES PT FOLLOW UP: Yes    Discharge Recommendations:  Discharge Recommendations: IP Rehab, Continue to assess pending progress, Patient would benefit from continued therapy after discharge    Patient Education:  PT Education: Goals, Transfer Training, Plan of Care, Precautions, Gait Training    Equipment Recommendations:   Other: monitor for needs    Plan:  Times per week: 7x BID/O  Times per day: Daily  Specific instructions for Next Treatment: ther ex, ther act, gait trng, transfers  Current Treatment Recommendations: Strengthening, Transfer Training,

## 2019-07-31 NOTE — PROGRESS NOTES
goals: at discharge  Short term goal 1: Pt to be Supervision for supine <> sit to get in/out of bed  Short term goal 2: Pt to be Supervision for sit <> stand to get up to ambulate  Short term goal 3: Pt to ambulate > 80 ft with RW with SBA for household distances  Short term goal 4: Pt to negotiate 2 steps with 1 rail with CGA for home access  Short term goal 5: Pt to complete car transfer with Min A for transportation needs  Long term goals  Time Frame for Long term goals : not set due to short ELOS    Following session, patient left in safe position with all fall risk precautions in place. Valerie Fierro.  [facility], [place] [address]

## 2019-07-31 NOTE — PLAN OF CARE
Problem: Falls - Risk of:  Goal: Will remain free from falls  Description  Will remain free from falls  7/30/2019 2357 by Jodi Desai RN  Outcome: Ongoing  7/30/2019 1808 by Bebe Vaz RN  Outcome: Met This Shift  Note:   Continue with fall precautions, bed rest, turn every 2 hours. Hip surgery today, up with PT/OT tomorrow. Goal: Absence of physical injury  Description  Absence of physical injury  7/30/2019 2357 by Jodi Desai RN  Outcome: Ongoing  7/30/2019 1808 by Bebe Vaz RN  Outcome: Met This Shift     Problem: Risk for Impaired Skin Integrity  Goal: Tissue integrity - skin and mucous membranes  Description  Structural intactness and normal physiological function of skin and  mucous membranes. 7/30/2019 2357 by Jodi Desai RN  Outcome: Ongoing  Note:   Ongoing assessment & interventions provided throughout shift. Skin assessments provided. Encouraging/assisting patient to turn as needed. Redness noted on buttocks and heels. Turning q 2hours. Will continue to monitor. 7/30/2019 1808 by Bebe Vaz RN  Outcome: Ongoing  Note:   Continue to assess skin, assess surgical incision to left hip (dry, intact) Turn every 2 hours while in bed. Problem: Pain:  Goal: Pain level will decrease  Description  Pain level will decrease  7/30/2019 2357 by Jodi Desai RN  Outcome: Ongoing  Note:   Ongoing assessment & interventions provided throughout shift. Reminded patient to report any pain, pressure, or shortness of breath to the nurse. Patient denies pain at this time. Will continue to monitor. 7/30/2019 1808 by Bebe Vaz RN  Outcome: Met This Shift  Note:   Patient denies pain. Goal: Control of acute pain  Description  Control of acute pain  7/30/2019 2357 by Jodi Desai RN  Outcome: Ongoing  7/30/2019 1808 by Bebe Vaz RN  Outcome: Met This Shift  Note:   Denies pain.    Goal: Control of chronic pain  Description  Control of chronic pain  7/30/2019 2357 by

## 2019-08-01 ENCOUNTER — APPOINTMENT (OUTPATIENT)
Dept: GENERAL RADIOLOGY | Age: 84
DRG: 469 | End: 2019-08-01
Payer: MEDICARE

## 2019-08-01 ENCOUNTER — APPOINTMENT (OUTPATIENT)
Dept: ULTRASOUND IMAGING | Age: 84
DRG: 469 | End: 2019-08-01
Payer: MEDICARE

## 2019-08-01 LAB
ALBUMIN SERPL-MCNC: 3.3 G/DL (ref 3.5–5.1)
ALP BLD-CCNC: 80 U/L (ref 38–126)
ALT SERPL-CCNC: 34 U/L (ref 11–66)
ANION GAP SERPL CALCULATED.3IONS-SCNC: 11 MEQ/L (ref 8–16)
ANION GAP SERPL CALCULATED.3IONS-SCNC: 13 MEQ/L (ref 8–16)
ANION GAP SERPL CALCULATED.3IONS-SCNC: 15 MEQ/L (ref 8–16)
AST SERPL-CCNC: 41 U/L (ref 5–40)
BASOPHILS # BLD: 0.2 %
BASOPHILS ABSOLUTE: 0 THOU/MM3 (ref 0–0.1)
BILIRUB SERPL-MCNC: 0.9 MG/DL (ref 0.3–1.2)
BUN BLDV-MCNC: 55 MG/DL (ref 7–22)
CALCIUM SERPL-MCNC: 8.2 MG/DL (ref 8.5–10.5)
CHLORIDE BLD-SCNC: 103 MEQ/L (ref 98–111)
CHLORIDE BLD-SCNC: 104 MEQ/L (ref 98–111)
CHLORIDE BLD-SCNC: 105 MEQ/L (ref 98–111)
CO2: 20 MEQ/L (ref 23–33)
CO2: 21 MEQ/L (ref 23–33)
CO2: 24 MEQ/L (ref 23–33)
CREAT SERPL-MCNC: 1.4 MG/DL (ref 0.4–1.2)
EOSINOPHIL # BLD: 3.1 %
EOSINOPHILS ABSOLUTE: 0.2 THOU/MM3 (ref 0–0.4)
ERYTHROCYTE [DISTWIDTH] IN BLOOD BY AUTOMATED COUNT: 17.1 % (ref 11.5–14.5)
ERYTHROCYTE [DISTWIDTH] IN BLOOD BY AUTOMATED COUNT: 58.2 FL (ref 35–45)
FOLATE: 19.3 NG/ML (ref 4.8–24.2)
GFR SERPL CREATININE-BSD FRML MDRD: 35 ML/MIN/1.73M2
GLUCOSE BLD-MCNC: 104 MG/DL (ref 70–108)
HAV IGM SER IA-ACNC: NEGATIVE
HCT VFR BLD CALC: 33.4 % (ref 37–47)
HEMOGLOBIN: 10.4 GM/DL (ref 12–16)
HEPATITIS B CORE IGM ANTIBODY: NEGATIVE
HEPATITIS B SURFACE ANTIGEN: NEGATIVE
HEPATITIS C ANTIBODY: NEGATIVE
IMMATURE GRANS (ABS): 0.02 THOU/MM3 (ref 0–0.07)
IMMATURE GRANULOCYTES: 0.4 %
INR BLD: 1.27 (ref 0.85–1.13)
LYMPHOCYTES # BLD: 10.9 %
LYMPHOCYTES ABSOLUTE: 0.6 THOU/MM3 (ref 1–4.8)
MAGNESIUM: 2 MG/DL (ref 1.6–2.4)
MCH RBC QN AUTO: 29.8 PG (ref 26–33)
MCHC RBC AUTO-ENTMCNC: 31.1 GM/DL (ref 32.2–35.5)
MCV RBC AUTO: 95.7 FL (ref 81–99)
MONOCYTES # BLD: 8.9 %
MONOCYTES ABSOLUTE: 0.5 THOU/MM3 (ref 0.4–1.3)
NUCLEATED RED BLOOD CELLS: 0 /100 WBC
PLATELET # BLD: 75 THOU/MM3 (ref 130–400)
PMV BLD AUTO: 11.3 FL (ref 9.4–12.4)
POTASSIUM REFLEX MAGNESIUM: 4.2 MEQ/L (ref 3.5–5.2)
POTASSIUM REFLEX MAGNESIUM: 4.2 MEQ/L (ref 3.5–5.2)
POTASSIUM SERPL-SCNC: 4.3 MEQ/L (ref 3.5–5.2)
RBC # BLD: 3.49 MILL/MM3 (ref 4.2–5.4)
SEG NEUTROPHILS: 76.5 %
SEGMENTED NEUTROPHILS ABSOLUTE COUNT: 4.1 THOU/MM3 (ref 1.8–7.7)
SODIUM BLD-SCNC: 138 MEQ/L (ref 135–145)
SODIUM BLD-SCNC: 138 MEQ/L (ref 135–145)
SODIUM BLD-SCNC: 140 MEQ/L (ref 135–145)
TOTAL PROTEIN: 5.5 G/DL (ref 6.1–8)
VITAMIN B-12: 1327 PG/ML (ref 211–911)
WBC # BLD: 5.4 THOU/MM3 (ref 4.8–10.8)

## 2019-08-01 PROCEDURE — 76937 US GUIDE VASCULAR ACCESS: CPT

## 2019-08-01 PROCEDURE — 85610 PROTHROMBIN TIME: CPT

## 2019-08-01 PROCEDURE — 80074 ACUTE HEPATITIS PANEL: CPT

## 2019-08-01 PROCEDURE — 2140000000 HC CCU INTERMEDIATE R&B

## 2019-08-01 PROCEDURE — 02HV33Z INSERTION OF INFUSION DEVICE INTO SUPERIOR VENA CAVA, PERCUTANEOUS APPROACH: ICD-10-PCS | Performed by: RADIOLOGY

## 2019-08-01 PROCEDURE — 80051 ELECTROLYTE PANEL: CPT

## 2019-08-01 PROCEDURE — P9047 ALBUMIN (HUMAN), 25%, 50ML: HCPCS | Performed by: INTERNAL MEDICINE

## 2019-08-01 PROCEDURE — C1751 CATH, INF, PER/CENT/MIDLINE: HCPCS

## 2019-08-01 PROCEDURE — 85025 COMPLETE CBC W/AUTO DIFF WBC: CPT

## 2019-08-01 PROCEDURE — 83735 ASSAY OF MAGNESIUM: CPT

## 2019-08-01 PROCEDURE — 2709999900 HC NON-CHARGEABLE SUPPLY

## 2019-08-01 PROCEDURE — 82607 VITAMIN B-12: CPT

## 2019-08-01 PROCEDURE — 82746 ASSAY OF FOLIC ACID SERUM: CPT

## 2019-08-01 PROCEDURE — 2500000003 HC RX 250 WO HCPCS: Performed by: INTERNAL MEDICINE

## 2019-08-01 PROCEDURE — 97530 THERAPEUTIC ACTIVITIES: CPT

## 2019-08-01 PROCEDURE — 80053 COMPREHEN METABOLIC PANEL: CPT

## 2019-08-01 PROCEDURE — 99232 SBSQ HOSP IP/OBS MODERATE 35: CPT | Performed by: INTERNAL MEDICINE

## 2019-08-01 PROCEDURE — 6360000002 HC RX W HCPCS: Performed by: INTERNAL MEDICINE

## 2019-08-01 PROCEDURE — 6370000000 HC RX 637 (ALT 250 FOR IP): Performed by: NURSE PRACTITIONER

## 2019-08-01 PROCEDURE — 99233 SBSQ HOSP IP/OBS HIGH 50: CPT | Performed by: INTERNAL MEDICINE

## 2019-08-01 PROCEDURE — 2580000003 HC RX 258: Performed by: INTERNAL MEDICINE

## 2019-08-01 PROCEDURE — 97116 GAIT TRAINING THERAPY: CPT

## 2019-08-01 PROCEDURE — 6370000000 HC RX 637 (ALT 250 FOR IP): Performed by: INTERNAL MEDICINE

## 2019-08-01 PROCEDURE — 36415 COLL VENOUS BLD VENIPUNCTURE: CPT

## 2019-08-01 PROCEDURE — 71045 X-RAY EXAM CHEST 1 VIEW: CPT

## 2019-08-01 PROCEDURE — 76705 ECHO EXAM OF ABDOMEN: CPT

## 2019-08-01 PROCEDURE — 36568 INSJ PICC <5 YR W/O IMAGING: CPT

## 2019-08-01 PROCEDURE — 99232 SBSQ HOSP IP/OBS MODERATE 35: CPT | Performed by: NURSE PRACTITIONER

## 2019-08-01 PROCEDURE — 97110 THERAPEUTIC EXERCISES: CPT

## 2019-08-01 RX ORDER — SODIUM CHLORIDE 0.9 % (FLUSH) 0.9 %
10 SYRINGE (ML) INJECTION EVERY 12 HOURS SCHEDULED
Status: DISCONTINUED | OUTPATIENT
Start: 2019-08-01 | End: 2019-08-06 | Stop reason: HOSPADM

## 2019-08-01 RX ORDER — POTASSIUM CHLORIDE 20 MEQ/1
20 TABLET, EXTENDED RELEASE ORAL ONCE
Status: COMPLETED | OUTPATIENT
Start: 2019-08-01 | End: 2019-08-01

## 2019-08-01 RX ORDER — SODIUM CHLORIDE 0.9 % (FLUSH) 0.9 %
10 SYRINGE (ML) INJECTION PRN
Status: DISCONTINUED | OUTPATIENT
Start: 2019-08-01 | End: 2019-08-06 | Stop reason: HOSPADM

## 2019-08-01 RX ORDER — BUMETANIDE 0.25 MG/ML
1 INJECTION, SOLUTION INTRAMUSCULAR; INTRAVENOUS ONCE
Status: COMPLETED | OUTPATIENT
Start: 2019-08-01 | End: 2019-08-01

## 2019-08-01 RX ORDER — LIDOCAINE HYDROCHLORIDE 10 MG/ML
5 INJECTION, SOLUTION EPIDURAL; INFILTRATION; INTRACAUDAL; PERINEURAL ONCE
Status: DISCONTINUED | OUTPATIENT
Start: 2019-08-01 | End: 2019-08-06 | Stop reason: HOSPADM

## 2019-08-01 RX ADMIN — BUMETANIDE 1 MG: 0.25 INJECTION INTRAMUSCULAR; INTRAVENOUS at 09:51

## 2019-08-01 RX ADMIN — Medication 10 ML: at 10:00

## 2019-08-01 RX ADMIN — BUMETANIDE 0.5 MG/HR: 0.25 INJECTION INTRAMUSCULAR; INTRAVENOUS at 09:51

## 2019-08-01 RX ADMIN — POTASSIUM CHLORIDE 20 MEQ: 20 TABLET, EXTENDED RELEASE ORAL at 18:29

## 2019-08-01 RX ADMIN — MIDODRINE HYDROCHLORIDE 10 MG: 10 TABLET ORAL at 17:29

## 2019-08-01 RX ADMIN — Medication 10 ML: at 21:45

## 2019-08-01 RX ADMIN — ALBUMIN (HUMAN) 25 G: 0.25 INJECTION, SOLUTION INTRAVENOUS at 04:49

## 2019-08-01 RX ADMIN — MIDODRINE HYDROCHLORIDE 10 MG: 10 TABLET ORAL at 09:51

## 2019-08-01 ASSESSMENT — PAIN SCALES - GENERAL
PAINLEVEL_OUTOF10: 0
PAINLEVEL_OUTOF10: 0

## 2019-08-01 NOTE — PROGRESS NOTES
Hospitalist Progress Note    Patient:  Susu Wild      Unit/Bed:3B-28/028-A    YOB: 1924    MRN: [id number]       Acct: [de-identified]     PCP: Magalys Webster. WALTER Spaulding CNP    Date of Admission: 7/28/2019      Assessment/Plan:  Active Hospital Problems    Diagnosis Date Noted    Aortic valve stenosis [I35.0]     Mitral valve insufficiency [I34.0]     Mitral valve stenosis [I05.0]     Traumatic rhabdomyolysis ([facility] [T79. 6XXA] 07/30/2019    Closed left hip fracture, initial encounter ([address] [S72.002A]     Acute congestive heart failure ([address] [I50.9]     Pre-op evaluation [Z01.818]     Closed fracture of left hip ([address] [S72.002A] 07/28/2019    Hypoxia [R09.02] 07/28/2019    Elevated troponin [R74.8] 07/28/2019    Elevated CK [R74.8] 07/28/2019    Lower extremity edema [R60.0] 07/28/2019    Hypotension [I95.9] 07/28/2019    Lactic acidosis [E87.2] 07/28/2019    MAG (acute kidney injury) ([address] [N17.9] 07/28/2019       1. Closed Displaced Lt Hip Fx- s/p open repair 7/30. Ortho following. No significant pain. Cardiology performed preoperative cardiac risk eval. PT/OT. Rehab suggested on DC, eventual PMR eval.   2. Acute Exacerbation of Chronic Systolic HF - with JVD and elevated BNP. Likely cardiorenal syndrome. Echo shows 40-45% EF, and global hypokinesis with multiple valvular abnormalities. 1. Cardiology consulted  2. Started dobutamine drip 7/31 at 2.5/hr for inodilator properties, especially given borderline BPs  3. Monitor for arrhythmia on telemetry, continue midodrine  4. Starting bumex drip on 8/1  5. Strict I/O (king to remain in for now), daily weights, low Na diet, fluid restrict  6. Hold ACE, not on BB  7. HF clinic on DC  3. Multi-Valvular Disease - patient states not interested in any intervention at this time  1. Moderate MS DARREN 1.2cm  2. Severe MR  3. Mild-Mod AS  4. Mod TR  5. Elevated RVSP 50mm Hg  4. Afib- chronic;  Rate controlled. Patient/son refuse [address] [place] [address]. 97.6 °F (36.4 °C) (Oral)   Resp 18   Ht 5' 5\" (1.651 m)   Wt 183 lb 14.4 oz (83.4 kg)   SpO2 92%   BMI 30.60 kg/m²     General appearance: No apparent distress, well developed, appears stated age, elderly pleasant and [place]. Eyes:  Pupils equal, round, and reactive to light. Conjunctivae/corneas clear. HENT: Head normal in appearance. External nares normal.  Oral mucosa moist without lesions. Neck: Supple, with full range of motion. +jvd. Trachea midline. Respiratory:  Normal respiratory effort. Bibasilar rales appreciated. Cardiovascular: Normal rate, irregular rhythm with normal S1/S2 with 3/6 LUCIANA. 3+ BLLE edema (stable)  Abdomen: Soft, non-tender, non-distended with normal bowel sounds, colostomy pouch in LLQ. Musculoskeletal: Limited ROM in left leg. Site of surgery left hip C/D/I  Skin: Skin color, texture, turgor normal.  No rashes or lesions, left leg slight erythema, also slight erythema/bruising of BL UE. Neurologic:  Neurovascularly intact without any focal sensory/motor deficits in the upper and lower extremities. Cranial nerves:  grossly non-focal.  Psychiatric: Alert and oriented, thought content appropriate, normal insight. Labs:   Recent Labs     07/30/19  0412 07/31/19 0407 08/01/19 0407   WBC 8.2 7.1 5.4   HGB 12.1 12.1 10.4*   HCT 39.2 40.6 33.4*   * 98* 75*     Recent Labs     07/31/19  0407 07/31/19  1256 08/01/19  0407    141 138   K 5.7* 5.0  5.0 4.3    105 104   CO2 19* 20* 21*   BUN 62* 61* 55*   CREATININE 2.0* 1.9* 1.4*   CALCIUM 8.2* 8.2* 8.2*     Recent Labs     08/01/19 0407   AST 41*   ALT 34   BILITOT 0.9   ALKPHOS 80     No results for input(s): INR in the last 72 hours.   Recent Labs     07/31/19 0407   CKTOTAL 36*       Microbiology:      Urinalysis:      Lab Results   Component Value Date    NITRU NEGATIVE 07/31/2019    WBCUA 15-25 07/31/2019    BACTERIA FEW 07/31/2019    RBCUA 15-20 07/31/2019    BLOODU MODERATE 07/31/2019    SPECGRAV

## 2019-08-01 NOTE — CARE COORDINATION
8/1/19, 1:29 PM      [facility] day: 4  Location: [facility]28/028-A Reason for admit: Hip fracture, right, closed, initial encounter [facility] Letha Standard   Procedure:  7/30 IVC filter placed. 7/30 - open tx femoral neck fracture with a prosthesis cemented.   Treatment Plan of Care: POD #2. Hospitalist, Ortho, Cardiology and Nephrology following. Labs: BUN 55, creatinine 1.4. Midodrine, Dobutamine gtt continues. Bumex gtt started. PICC line. Feldman in place. PT/OT. PCP: WALTER Banda - CNP  Readmission Risk Score: 22%  Discharge Plan: Discussed with Dr. Michael Tovar this am, explained that PT/OT recommending Rehab and would need a physiatry consult when pt is medically better.    Electronically signed by Alec Ross RN on 8/1/2019 at 1:36 PM

## 2019-08-01 NOTE — PROGRESS NOTES
upper - diminished lower to auscultation bilaterally- no wheezes, rales or rhonchi, normal air movement, no respiratory distress  Abdomen: soft, non-tender, non-distended, normal bowel sounds, no masses Extremities: no cyanosis, clubbing - Hips/ abd down to feet 4+ pitting edema, pulses present    Skin: warm and dry, no rash or erythema  Head: normocephalic and atraumatic   Musculoskeletal: normal range of motion, no joint swelling, deformity or tenderness  Neurological: alert, oriented, normal speech, no focal findings or movement disorder noted    Medications:    lidocaine 1 % injection  5 mL Intradermal Once    sodium chloride flush  10 mL Intravenous 2 times per day    [Held by provider] enoxaparin  30 mg Subcutaneous Daily    midodrine  10 mg Oral TID WC    sodium chloride  500 mL Intravenous Once    sodium chloride flush  10 mL Intravenous 2 times per day      bumetanide 0.1 mg/mL infusion 0.5 mg/hr (19 0951)    dextrose      DOBUTamine 2.5 mcg/kg/min (19 1303)       sodium chloride flush 10 mL PRN   glucose 15 g PRN   dextrose 12.5 g PRN   glucagon (rDNA) 1 mg PRN   dextrose 100 mL/hr PRN   HYDROcodone 5 mg - acetaminophen 0.5 tablet Q6H PRN   Or     HYDROcodone 5 mg - acetaminophen 1 tablet Q6H PRN   morphine 1 mg Q2H PRN   Or     morphine 2 mg Q2H PRN   sodium chloride flush 10 mL PRN   potassium chloride 40 mEq PRN   Or     potassium alternative oral replacement 40 mEq PRN   Or     potassium chloride 10 mEq PRN   magnesium hydroxide 30 mL Daily PRN   ondansetron 4 mg Q6H PRN   acetaminophen 650 mg Q4H PRN       Diagnostics:  EK2019 07:02:58 [facility] ROUTINE RETRIEVAL  Atrial fibrillation  Right axis deviation  Possible Right ventricular hypertrophy  Abnormal QRS-T angle, consider primary T wave abnormality  Abnormal ECG  When compared with ECG of 2019 21:20,  No significant change was found  Confirmed by CHRISTINE ACUNA ([id number]) on 2019 8:50:28

## 2019-08-01 NOTE — PLAN OF CARE
Problem: Falls - Risk of:  Goal: Will remain free from falls  Description  Will remain free from falls  Outcome: Ongoing  Note:   Patient free of falls. Call light within reach. Bed in lowest position. Nonskid socks on. Bed alarm on. Hourly rounding continues. Bed alarm on 2nd sensitivity. Goal: Absence of physical injury  Description  Absence of physical injury  Outcome: Ongoing     Problem: Risk for Impaired Skin Integrity  Goal: Tissue integrity - skin and mucous membranes  Description  Structural intactness and normal physiological function of skin and  mucous membranes. Outcome: Ongoing  Note:   Ongoing assessment & interventions provided throughout shift. Skin assessments provided. Encouraging/assisting patient to turn as needed. Problem: Pain:  Goal: Pain level will decrease  Description  Pain level will decrease  Outcome: Ongoing  Note:   Patient denies pain this shift- will continue to monitor. Goal: Control of acute pain  Description  Control of acute pain  Outcome: Ongoing  Goal: Control of chronic pain  Description  Control of chronic pain  Outcome: Ongoing     Problem: Discharge Planning:  Goal: Discharged to appropriate level of care  Description  Discharged to appropriate level of care  Outcome: Ongoing  Note:   Patient is from home alone- we expect her to go to [facility]. Problem: Bowel Function - Altered:  Goal: Bowel elimination is within specified parameters  Description  Bowel elimination is within specified parameters  Outcome: Ongoing  Note:   Patient has colostomy- stoma I red/moist. Little output- will continue to monitor. Problem: Cardiac Output - Decreased:  Goal: Hemodynamic stability will improve  Description  Hemodynamic stability will improve  Outcome: Ongoing  Note:   Patient is currently on dobutamine gtt to improve the hearts contractility. Pt's BP has been improving- this shift SBP has not been lower than 100.       Problem: Fluid Volume - Imbalance:  Goal: Absence of imbalanced fluid volume signs and symptoms  Description  Absence of imbalanced fluid volume signs and symptoms  Outcome: Ongoing  Note:   Strict I&Os recorded q 4hrs per nephrology. Problem: Gas Exchange - Impaired:  Goal: Levels of oxygenation will improve  Description  Levels of oxygenation will improve  Outcome: Ongoing  Note:   Patient remains on 1L this shift. Will continue to monitor and attempt oxygenation at room air. Care plan reviewed with patient. Patient verbalizes understanding of the care plan and contributed to goal setting.

## 2019-08-01 NOTE — PROGRESS NOTES
[facility]  INPATIENT PHYSICAL THERAPY  DAILY NOTE  [facility] CCU-STEPDOWN 3B - 3B-28/028-A    Time In: 9914  Time Out: 1430  Timed Code Treatment Minutes: 34 Minutes  Minutes: 34          Date: 2019  Patient Name: Scot Ta,  Gender:  female        MRN: [id number]  : 1924  (89 y.o.)     Referring Practitioner: WALTER Celaya CNP  Diagnosis: Hip fracture, right, closed  (should be listed as \"left\")  Additional Pertinent Hx: Per ED note, pt is a 80 y.o. female with a past medical history of HTN. The patient presents to the ED via EMS for evaluation of left leg weakness. The patient states that two days ago she felt a pain in her left thigh which was present for a couple hours and then resolved. The patient has not had any pain in her thigh since, however, began to have weakness in the left leg today. She reports having difficulty walking due to this weakness. Patient normally ambulates without assistance. Family found the patient sitting on the floor today when they checked on her. The patient also noticed increased swelling to her BLE today. The patient does not have any pain at this time. The patient is not anticoagulated. No chest pain, headache, numbness. Prior Level of Function:  Lives With: Alone  Type of Home: House  Home Layout: One level, Laundry in basement, Work area in [address] Access: Stairs to enter with [address] - Number of Steps: 2 steps with a rail  Home Equipment: Rolling walker    Restrictions/Precautions:  Restrictions/Precautions: Weight Bearing, Fall Risk, General Precautions, Surgical Protocols  Left Lower Extremity Weight Bearing: Weight Bearing As Tolerated  Position Activity Restriction  Hip Precautions: No hip flexion > 90 degrees, No hip external rotation, No hip internal rotation, No ADduction  Other position/activity restrictions: THR precautions    SUBJECTIVE:  RN approved session.  Patient resting in bed upon arrival.

## 2019-08-01 NOTE — PROGRESS NOTES
effervescent tablet 40 mEq, 40 mEq, Oral, PRN **OR** potassium chloride 10 mEq/100 mL IVPB (Peripheral Line), 10 mEq, Intravenous, PRN  magnesium hydroxide (MILK OF MAGNESIA) 400 MG/5ML suspension 30 mL, 30 mL, Oral, Daily PRN  ondansetron (ZOFRAN) injection 4 mg, 4 mg, Intravenous, Q6H PRN  acetaminophen (TYLENOL) tablet 650 mg, 650 mg, Oral, Q4H PRN    REVIEW OF SYSTEMS:  Constitutional: Denies any fever, chills. Derm: Denies any rash or skin color change. Eyes: Denies blurred or decreased in vision. Ent: Denies any tinnitus or vertigo. Resp: Denies any cough or shortness of breath. CV: Denies any syncope, palpitations or chest pain. GI:  Denies any abdominal pain, nausea, vomiting, constipation or diarrhea. : Denies any hematuria, hesitancy, or dysuria. Heme/Lymph: Denies any bleeding. Musculoskeletal: Denies numbness and tingling LLE, pain L hip   Neuro: Denies any dizziness, paresthesia or weakness. OBJECTIVE    Patient Vitals for the past 24 hrs:   BP Temp Temp src Pulse Resp SpO2 Weight   08/01/19 0930 (!) 111/57 98 °F (36.7 °C) Oral 105 18 93 % --   08/01/19 0513 -- -- -- -- -- 92 % --   08/01/19 0428 (!) 109/57 97.6 °F (36.4 °C) Oral 108 18 96 % 183 lb 14.4 oz (83.4 kg)   08/01/19 0005 (!) 110/55 97.5 °F (36.4 °C) Oral 90 18 97 % --   07/31/19 1930 112/61 97.6 °F (36.4 °C) Oral 106 20 97 % --   07/31/19 1748 (!) 115/57 98.1 °F (36.7 °C) Oral 80 20 95 % --   07/31/19 1035 -- -- -- -- -- 95 % --     INTAKE/OUTPUT:    Intake/Output Summary (Last 24 hours) at 8/1/2019 1006  Last data filed at 8/1/2019 0917  Gross per 24 hour   Intake 2127.91 ml   Output 875 ml   Net 1252.91 ml     I/O last 3 completed shifts: In: 1524.9 [P.O.:995; I.V.:529.9]  Out: 700 [Urine:700]    PHYSICAL EXAM:  General appearance:  Alert and oriented x 3. No apparent distress, appears stated age and cooperative. HEENT:  Normal cephalic, atraumatic without obvious deformity. Conjunctivae/corneas clear.   Neck: Supple, with

## 2019-08-02 LAB
ALBUMIN SERPL-MCNC: 3.5 G/DL (ref 3.5–5.1)
ALP BLD-CCNC: 83 U/L (ref 38–126)
ALT SERPL-CCNC: 22 U/L (ref 11–66)
AMMONIA: 39 UMOL/L (ref 11–60)
ANION GAP SERPL CALCULATED.3IONS-SCNC: 14 MEQ/L (ref 8–16)
APTT: 33 SECONDS (ref 22–38)
AST SERPL-CCNC: 34 U/L (ref 5–40)
BACTERIA: ABNORMAL
BILIRUB SERPL-MCNC: 1.4 MG/DL (ref 0.3–1.2)
BILIRUBIN DIRECT: 0.5 MG/DL (ref 0–0.3)
BILIRUBIN URINE: NEGATIVE
BLOOD, URINE: ABNORMAL
BUN BLDV-MCNC: 48 MG/DL (ref 7–22)
CALCIUM SERPL-MCNC: 8.8 MG/DL (ref 8.5–10.5)
CASTS: ABNORMAL /LPF
CASTS: ABNORMAL /LPF
CHARACTER, URINE: CLEAR
CHLORIDE BLD-SCNC: 100 MEQ/L (ref 98–111)
CO2: 28 MEQ/L (ref 23–33)
COLOR: YELLOW
CREAT SERPL-MCNC: 1.1 MG/DL (ref 0.4–1.2)
CRYSTALS: ABNORMAL
EPITHELIAL CELLS, UA: ABNORMAL /HPF
ERYTHROCYTE [DISTWIDTH] IN BLOOD BY AUTOMATED COUNT: 17.1 % (ref 11.5–14.5)
ERYTHROCYTE [DISTWIDTH] IN BLOOD BY AUTOMATED COUNT: 55 FL (ref 35–45)
GFR SERPL CREATININE-BSD FRML MDRD: 46 ML/MIN/1.73M2
GLUCOSE BLD-MCNC: 172 MG/DL (ref 70–108)
GLUCOSE, URINE: NEGATIVE MG/DL
HCT VFR BLD CALC: 34.8 % (ref 37–47)
HEMOGLOBIN: 11.3 GM/DL (ref 12–16)
KETONES, URINE: NEGATIVE
LEUKOCYTE ESTERASE, URINE: ABNORMAL
MAGNESIUM: 1.5 MG/DL (ref 1.6–2.4)
MCH RBC QN AUTO: 30.1 PG (ref 26–33)
MCHC RBC AUTO-ENTMCNC: 32.5 GM/DL (ref 32.2–35.5)
MCV RBC AUTO: 92.6 FL (ref 81–99)
MISCELLANEOUS LAB TEST RESULT: ABNORMAL
NITRITE, URINE: NEGATIVE
PH UA: 5 (ref 5–9)
PLATELET # BLD: 87 THOU/MM3 (ref 130–400)
PMV BLD AUTO: 11.3 FL (ref 9.4–12.4)
POTASSIUM REFLEX MAGNESIUM: 3.5 MEQ/L (ref 3.5–5.2)
POTASSIUM SERPL-SCNC: 3.5 MEQ/L (ref 3.5–5.2)
PROTEIN UA: NEGATIVE MG/DL
RBC # BLD: 3.76 MILL/MM3 (ref 4.2–5.4)
RBC URINE: ABNORMAL /HPF
RENAL EPITHELIAL, UA: ABNORMAL
SCAN OF BLOOD SMEAR: NORMAL
SODIUM BLD-SCNC: 142 MEQ/L (ref 135–145)
SPECIFIC GRAVITY UA: 1.01 (ref 1–1.03)
TOTAL PROTEIN: 5.8 G/DL (ref 6.1–8)
UROBILINOGEN, URINE: 1 EU/DL (ref 0–1)
WBC # BLD: 6.7 THOU/MM3 (ref 4.8–10.8)
WBC UA: ABNORMAL /HPF
YEAST: ABNORMAL

## 2019-08-02 PROCEDURE — 6360000002 HC RX W HCPCS: Performed by: NURSE PRACTITIONER

## 2019-08-02 PROCEDURE — 82140 ASSAY OF AMMONIA: CPT

## 2019-08-02 PROCEDURE — 2580000003 HC RX 258: Performed by: NURSE PRACTITIONER

## 2019-08-02 PROCEDURE — 85730 THROMBOPLASTIN TIME PARTIAL: CPT

## 2019-08-02 PROCEDURE — 2580000003 HC RX 258: Performed by: INTERNAL MEDICINE

## 2019-08-02 PROCEDURE — 6370000000 HC RX 637 (ALT 250 FOR IP): Performed by: INTERNAL MEDICINE

## 2019-08-02 PROCEDURE — 6370000000 HC RX 637 (ALT 250 FOR IP): Performed by: NURSE PRACTITIONER

## 2019-08-02 PROCEDURE — 80053 COMPREHEN METABOLIC PANEL: CPT

## 2019-08-02 PROCEDURE — 83735 ASSAY OF MAGNESIUM: CPT

## 2019-08-02 PROCEDURE — 36415 COLL VENOUS BLD VENIPUNCTURE: CPT

## 2019-08-02 PROCEDURE — 99232 SBSQ HOSP IP/OBS MODERATE 35: CPT | Performed by: NURSE PRACTITIONER

## 2019-08-02 PROCEDURE — 97110 THERAPEUTIC EXERCISES: CPT

## 2019-08-02 PROCEDURE — 87086 URINE CULTURE/COLONY COUNT: CPT

## 2019-08-02 PROCEDURE — 80051 ELECTROLYTE PANEL: CPT

## 2019-08-02 PROCEDURE — 85027 COMPLETE CBC AUTOMATED: CPT

## 2019-08-02 PROCEDURE — 99233 SBSQ HOSP IP/OBS HIGH 50: CPT | Performed by: INTERNAL MEDICINE

## 2019-08-02 PROCEDURE — 82248 BILIRUBIN DIRECT: CPT

## 2019-08-02 PROCEDURE — 99232 SBSQ HOSP IP/OBS MODERATE 35: CPT | Performed by: INTERNAL MEDICINE

## 2019-08-02 PROCEDURE — 97530 THERAPEUTIC ACTIVITIES: CPT

## 2019-08-02 PROCEDURE — 2709999900 HC NON-CHARGEABLE SUPPLY

## 2019-08-02 PROCEDURE — 81001 URINALYSIS AUTO W/SCOPE: CPT

## 2019-08-02 PROCEDURE — 97116 GAIT TRAINING THERAPY: CPT

## 2019-08-02 PROCEDURE — 2140000000 HC CCU INTERMEDIATE R&B

## 2019-08-02 RX ORDER — POTASSIUM CHLORIDE 20 MEQ/1
20 TABLET, EXTENDED RELEASE ORAL 2 TIMES DAILY
Status: DISCONTINUED | OUTPATIENT
Start: 2019-08-02 | End: 2019-08-06 | Stop reason: HOSPADM

## 2019-08-02 RX ORDER — MAGNESIUM SULFATE IN WATER 40 MG/ML
2 INJECTION, SOLUTION INTRAVENOUS ONCE
Status: COMPLETED | OUTPATIENT
Start: 2019-08-02 | End: 2019-08-02

## 2019-08-02 RX ORDER — CARVEDILOL 3.12 MG/1
3.12 TABLET ORAL 2 TIMES DAILY
Status: DISCONTINUED | OUTPATIENT
Start: 2019-08-02 | End: 2019-08-02

## 2019-08-02 RX ORDER — POLYETHYLENE GLYCOL 3350 17 G/17G
17 POWDER, FOR SOLUTION ORAL DAILY
Status: DISCONTINUED | OUTPATIENT
Start: 2019-08-02 | End: 2019-08-06 | Stop reason: HOSPADM

## 2019-08-02 RX ADMIN — Medication 10 ML: at 20:58

## 2019-08-02 RX ADMIN — MAGNESIUM GLUCONATE 500 MG ORAL TABLET 400 MG: 500 TABLET ORAL at 14:23

## 2019-08-02 RX ADMIN — DOBUTAMINE HYDROCHLORIDE 2.5 MCG/KG/MIN: 200 INJECTION INTRAVENOUS at 12:58

## 2019-08-02 RX ADMIN — ACETAMINOPHEN 650 MG: 325 TABLET ORAL at 10:07

## 2019-08-02 RX ADMIN — MAGNESIUM SULFATE HEPTAHYDRATE 2 G: 40 INJECTION, SOLUTION INTRAVENOUS at 14:23

## 2019-08-02 RX ADMIN — MIDODRINE HYDROCHLORIDE 10 MG: 10 TABLET ORAL at 12:57

## 2019-08-02 RX ADMIN — MIDODRINE HYDROCHLORIDE 10 MG: 10 TABLET ORAL at 10:07

## 2019-08-02 RX ADMIN — MIDODRINE HYDROCHLORIDE 10 MG: 10 TABLET ORAL at 17:22

## 2019-08-02 RX ADMIN — Medication 10 ML: at 09:50

## 2019-08-02 RX ADMIN — POTASSIUM BICARBONATE 40 MEQ: 782 TABLET, EFFERVESCENT ORAL at 16:48

## 2019-08-02 RX ADMIN — POLYETHYLENE GLYCOL 3350 17 G: 17 POWDER, FOR SOLUTION ORAL at 12:58

## 2019-08-02 RX ADMIN — Medication 10 ML: at 09:49

## 2019-08-02 RX ADMIN — MAGNESIUM GLUCONATE 500 MG ORAL TABLET 400 MG: 500 TABLET ORAL at 20:57

## 2019-08-02 ASSESSMENT — PAIN SCALES - GENERAL
PAINLEVEL_OUTOF10: 0

## 2019-08-02 NOTE — PROGRESS NOTES
Plan of Care, Bed Mobility, Transfers, Gait, Verbal Exercise Instruction    Goals:  Patient goals : go home  Short term goals  Time Frame for Short term goals: at discharge  Short term goal 1: Pt to be Supervision for supine <> sit to get in/out of bed  Short term goal 2: Pt to be Supervision for sit <> stand to get up to ambulate  Short term goal 3: Pt to ambulate > 80 ft with RW with SBA for household distances  Short term goal 4: Pt to negotiate 2 steps with 1 rail with CGA for home access  Short term goal 5: Pt to complete car transfer with Min A for transportation needs  Long term goals  Time Frame for Long term goals : not set due to short ELOS    Following session, patient left in safe position with all fall risk precautions in place.

## 2019-08-02 NOTE — PROGRESS NOTES
prior to session. Patient agreeable to therapy. Son arrived, did not observe session. PAIN: 3/10: L hip, received tylenol prior    OBJECTIVE:    Transfers:  Sit to Stand: Stand By Assistance, [employer] Assistance  Stand to Sit:Stand By Assistance, [employer] Assistance   Comments: cues for hand placement    Ambulation:  Contact Guard Assistance  Distance: ~50ft x1  Surface: Level Tile  Device:Rolling Walker  Gait Deviations: Forward Flexed Posture, Decreased Step Length Bilaterally, Decreased Weight Shift Left, Decreased Gait Speed, Decreased Heel Strike Bilaterally and Mild Path Deviations    Exercise:  Patient was guided in 1 set(s) 12 reps of exercise to both lower extremities. Ankle pumps, Glut sets, Quad sets, Heelslides, Long arc quads and Hip abduction/adduction. Exercises were completed for increased independence with functional mobility. Functional Outcome Measures: Completed  AM-PAC Inpatient Mobility without Stair Climbing Raw Score : 15  AM-PAC Inpatient without Stair Climbing T-Scale Score : 43.03    ASSESSMENT:  Assessment: Patient motivated for therapy. Patient limited by endurance this session from just finishing with OT. Patient would benefit from continued skilled physical therapy to improve functional mobility. Activity Tolerance:  Patient tolerance of  treatment: good. Equipment Recommendations: Other: monitor for needs  Discharge Recommendations:  Discharge Recommendations: IP Rehab, Continue to assess pending progress, Patient would benefit from continued therapy after discharge, TCU    Plan: Times per week: 7x BID/O  Times per day: Daily  Specific instructions for Next Treatment: ther ex, ther act, gait trng, transfers  Current Treatment Recommendations: Strengthening, Transfer Training, Endurance Training, ROM, Balance Training, Gait Training, Functional Mobility Training, Stair training, Safety Education & Training, Patient/Caregiver Education & Training, Equipment

## 2019-08-02 NOTE — PROGRESS NOTES
HDL 47 08/10/2012    LDLCALC 150 08/10/2012       TSH:  No results found for: TSH      Assessment:    S\p open tx femoral neck fracture with a prosthesis cemented 7/30/19    Post-op hypotension- improved with dobutrex   On midodrone    MAG  / ? CKD -- renal following   Hyperkalemia- resolved  Elevated LFT- congestive     Anemia  Thrombocytopenia    Fluid volume overload / acute on chronic diastolic chf secondary to valvular heart DZ / cardiorenal DZ? ?? / with LE swelling - renal managing diuresis     Moderate MS / severe MR / mild to moderate AS - limited code   CDMP EF 40-45%    Chronic AFB CVR   [id number] at least 5 per prior notes- refuses [address]     Hx LLE DVT - IVC filter in place 7/30/19        Plan:    Continue diuresis / dobutrex- when iv bumex is dc - stop dobutrex    Needs CDMP meds initiated as chf improves - trial bb - ?ACE/ ARB  ? aldactone  w/ severe valvular dz - ?  Renal to help address    follow             Electronically signed by WALTER Buck - CNP on 8/2/2019 at 12:22 PM

## 2019-08-02 NOTE — PROGRESS NOTES
Hospitalist Progress Note    Patient:  Justine Stanton      Unit/Bed:3B-28/028-A    YOB: 1924    MRN: [id number]       Acct: [de-identified]     PCP: Daniel King. WALTER Dahl CNP    Date of Admission: 7/28/2019      Assessment/Plan:  Active Hospital Problems    Diagnosis Date Noted    Severe mitral regurgitation by prior echocardiogram [I34.0]      Priority: High    Dilated cardiomyopathy ([address] [I42.0]      Priority: High    Hyperkalemia [E87.5]     Aortic valve stenosis [I35.0]     Mitral valve insufficiency [I34.0]     Mitral valve stenosis [I05.0]     Traumatic rhabdomyolysis ([facility] [T79. 6XXA] 07/30/2019    Closed left hip fracture, initial encounter ([address] [S72.002A]     Acute congestive heart failure ([address] [I50.9]     Pre-op evaluation [Z01.818]     Closed fracture of left hip ([facility] [S72.002A] 07/28/2019    Hypoxia [R09.02] 07/28/2019    Elevated troponin [R74.8] 07/28/2019    Elevated CK [R74.8] 07/28/2019    Lower extremity edema [R60.0] 07/28/2019    Hypotension [I95.9] 07/28/2019    Lactic acidosis [E87.2] 07/28/2019    MAG (acute kidney injury) ([facility] [N17.9] 07/28/2019       1. Closed Displaced Lt Hip Fx- s/p open repair 7/30. Ortho following. No significant pain. Cardiology performed preoperative cardiac risk eval. PT/OT. Rehab suggested on DC, eventual PMR eval.   2. Acute Exacerbation of Chronic Systolic HF - with JVD and elevated BNP. Likely cardiorenal syndrome. Echo shows 40-45% EF, and global hypokinesis with multiple valvular abnormalities. 1. Cardiology consulted  2. Started dobutamine drip 7/31 at 2.5/hr for inodilator properties, especially given borderline BPs  3. Monitor for arrhythmia on telemetry, continue midodrine  4. Started bumex drip on 8/1  5. Q12h electrolytes - however patient refusing labs this am - d/w Nephro and if unable to get labs do not feel comfortable continuing drip - will reassess throughout day  6.  Strict I/O (king to remain in for food was tampered with. Patient denies any chest pain, abdominal pain, nausea vomiting. Notes no bowel movement over past several days, was on bowel regimen per son PTA. Otherwise, patient has no SOB and is on RA now. Notes occasional left hip pain with ambulation. Frustrated by bleeding/bruising. Medications:  Reviewed    Infusion Medications    bumetanide 0.1 mg/mL infusion 0.5 mg/hr (08/01/19 0914)    dextrose      DOBUTamine 2.5 mcg/kg/min (07/31/19 1303)     Scheduled Medications    lidocaine 1 % injection  5 mL Intradermal Once    sodium chloride flush  10 mL Intravenous 2 times per day    [Held by provider] enoxaparin  30 mg Subcutaneous Daily    midodrine  10 mg Oral TID WC    sodium chloride  500 mL Intravenous Once    sodium chloride flush  10 mL Intravenous 2 times per day     PRN Meds: sodium chloride flush, glucose, dextrose, glucagon (rDNA), dextrose, HYDROcodone 5 mg - acetaminophen **OR** HYDROcodone 5 mg - acetaminophen, morphine **OR** morphine, sodium chloride flush, potassium chloride **OR** potassium alternative oral replacement **OR** potassium chloride, magnesium hydroxide, ondansetron, acetaminophen      Intake/Output Summary (Last 24 hours) at 8/2/2019 0706  Last data filed at 8/2/2019 8996  Gross per 24 hour   Intake 1401.02 ml   Output 8350 ml   Net -6948.98 ml     Weight change:       Exam:  /61   Pulse 90   Temp 98.2 °F (36.8 °C) (Oral)   Resp 19   Ht 5' 5\" (1.651 m)   Wt 183 lb 14.4 oz (83.4 kg)   SpO2 97%   BMI 30.60 kg/m²     General appearance: No apparent distress, well developed, appears stated age, elderly pleasant and [place]. Eyes:  Pupils equal, round, and reactive to light. Conjunctivae/corneas clear. HENT: Head normal in appearance. External nares normal.  Oral mucosa moist without lesions. Neck: Supple, with full range of motion. +jvd. Trachea midline. Respiratory:  Normal respiratory effort.  Bibasilar rales appreciated (less so than prior

## 2019-08-03 LAB
ALBUMIN SERPL-MCNC: 3.1 G/DL (ref 3.5–5.1)
ALP BLD-CCNC: 73 U/L (ref 38–126)
ALT SERPL-CCNC: 16 U/L (ref 11–66)
ANION GAP SERPL CALCULATED.3IONS-SCNC: 11 MEQ/L (ref 8–16)
ANION GAP SERPL CALCULATED.3IONS-SCNC: 8 MEQ/L (ref 8–16)
ANION GAP SERPL CALCULATED.3IONS-SCNC: 9 MEQ/L (ref 8–16)
AST SERPL-CCNC: 24 U/L (ref 5–40)
BILIRUB SERPL-MCNC: 1.3 MG/DL (ref 0.3–1.2)
BLOOD CULTURE, ROUTINE: NORMAL
BLOOD CULTURE, ROUTINE: NORMAL
BUN BLDV-MCNC: 46 MG/DL (ref 7–22)
CALCIUM SERPL-MCNC: 8.8 MG/DL (ref 8.5–10.5)
CHLORIDE BLD-SCNC: 100 MEQ/L (ref 98–111)
CHLORIDE BLD-SCNC: 97 MEQ/L (ref 98–111)
CHLORIDE BLD-SCNC: 97 MEQ/L (ref 98–111)
CO2: 33 MEQ/L (ref 23–33)
CO2: 33 MEQ/L (ref 23–33)
CO2: 35 MEQ/L (ref 23–33)
CREAT SERPL-MCNC: 1 MG/DL (ref 0.4–1.2)
ERYTHROCYTE [DISTWIDTH] IN BLOOD BY AUTOMATED COUNT: 17.3 % (ref 11.5–14.5)
ERYTHROCYTE [DISTWIDTH] IN BLOOD BY AUTOMATED COUNT: 55.8 FL (ref 35–45)
GFR SERPL CREATININE-BSD FRML MDRD: 51 ML/MIN/1.73M2
GLUCOSE BLD-MCNC: 107 MG/DL (ref 70–108)
HCT VFR BLD CALC: 34.7 % (ref 37–47)
HEMOGLOBIN: 11.1 GM/DL (ref 12–16)
MAGNESIUM: 1.6 MG/DL (ref 1.6–2.4)
MCH RBC QN AUTO: 29.4 PG (ref 26–33)
MCHC RBC AUTO-ENTMCNC: 32 GM/DL (ref 32.2–35.5)
MCV RBC AUTO: 92 FL (ref 81–99)
PLATELET # BLD: 104 THOU/MM3 (ref 130–400)
PMV BLD AUTO: 10.7 FL (ref 9.4–12.4)
POTASSIUM REFLEX MAGNESIUM: 4 MEQ/L (ref 3.5–5.2)
POTASSIUM REFLEX MAGNESIUM: 4.1 MEQ/L (ref 3.5–5.2)
POTASSIUM REFLEX MAGNESIUM: 4.2 MEQ/L (ref 3.5–5.2)
POTASSIUM SERPL-SCNC: 4.1 MEQ/L (ref 3.5–5.2)
RBC # BLD: 3.77 MILL/MM3 (ref 4.2–5.4)
SODIUM BLD-SCNC: 139 MEQ/L (ref 135–145)
SODIUM BLD-SCNC: 140 MEQ/L (ref 135–145)
SODIUM BLD-SCNC: 144 MEQ/L (ref 135–145)
TOTAL PROTEIN: 5.5 G/DL (ref 6.1–8)
WBC # BLD: 7.6 THOU/MM3 (ref 4.8–10.8)

## 2019-08-03 PROCEDURE — 6370000000 HC RX 637 (ALT 250 FOR IP): Performed by: NURSE PRACTITIONER

## 2019-08-03 PROCEDURE — 6370000000 HC RX 637 (ALT 250 FOR IP): Performed by: PHYSICIAN ASSISTANT

## 2019-08-03 PROCEDURE — 85027 COMPLETE CBC AUTOMATED: CPT

## 2019-08-03 PROCEDURE — 36592 COLLECT BLOOD FROM PICC: CPT

## 2019-08-03 PROCEDURE — 99233 SBSQ HOSP IP/OBS HIGH 50: CPT | Performed by: INTERNAL MEDICINE

## 2019-08-03 PROCEDURE — 97110 THERAPEUTIC EXERCISES: CPT

## 2019-08-03 PROCEDURE — 2580000003 HC RX 258: Performed by: INTERNAL MEDICINE

## 2019-08-03 PROCEDURE — 2709999900 HC NON-CHARGEABLE SUPPLY

## 2019-08-03 PROCEDURE — 97116 GAIT TRAINING THERAPY: CPT

## 2019-08-03 PROCEDURE — 97530 THERAPEUTIC ACTIVITIES: CPT

## 2019-08-03 PROCEDURE — 2500000003 HC RX 250 WO HCPCS: Performed by: INTERNAL MEDICINE

## 2019-08-03 PROCEDURE — 97535 SELF CARE MNGMENT TRAINING: CPT

## 2019-08-03 PROCEDURE — 6370000000 HC RX 637 (ALT 250 FOR IP): Performed by: INTERNAL MEDICINE

## 2019-08-03 PROCEDURE — 80053 COMPREHEN METABOLIC PANEL: CPT

## 2019-08-03 PROCEDURE — 80051 ELECTROLYTE PANEL: CPT

## 2019-08-03 PROCEDURE — 99232 SBSQ HOSP IP/OBS MODERATE 35: CPT | Performed by: INTERNAL MEDICINE

## 2019-08-03 PROCEDURE — 2140000000 HC CCU INTERMEDIATE R&B

## 2019-08-03 PROCEDURE — 36415 COLL VENOUS BLD VENIPUNCTURE: CPT

## 2019-08-03 PROCEDURE — 83735 ASSAY OF MAGNESIUM: CPT

## 2019-08-03 RX ORDER — CARVEDILOL 3.12 MG/1
3.12 TABLET ORAL 2 TIMES DAILY WITH MEALS
Status: DISCONTINUED | OUTPATIENT
Start: 2019-08-03 | End: 2019-08-05

## 2019-08-03 RX ADMIN — POTASSIUM CHLORIDE 20 MEQ: 20 TABLET, EXTENDED RELEASE ORAL at 10:08

## 2019-08-03 RX ADMIN — MIDODRINE HYDROCHLORIDE 10 MG: 10 TABLET ORAL at 16:52

## 2019-08-03 RX ADMIN — MIDODRINE HYDROCHLORIDE 10 MG: 10 TABLET ORAL at 11:57

## 2019-08-03 RX ADMIN — ACETAMINOPHEN 650 MG: 325 TABLET ORAL at 10:08

## 2019-08-03 RX ADMIN — CARVEDILOL 3.12 MG: 3.12 TABLET, FILM COATED ORAL at 17:36

## 2019-08-03 RX ADMIN — MAGNESIUM GLUCONATE 500 MG ORAL TABLET 400 MG: 500 TABLET ORAL at 20:46

## 2019-08-03 RX ADMIN — BUMETANIDE 0.5 MG/HR: 0.25 INJECTION INTRAMUSCULAR; INTRAVENOUS at 04:26

## 2019-08-03 RX ADMIN — MIDODRINE HYDROCHLORIDE 10 MG: 10 TABLET ORAL at 10:08

## 2019-08-03 RX ADMIN — MAGNESIUM GLUCONATE 500 MG ORAL TABLET 400 MG: 500 TABLET ORAL at 10:08

## 2019-08-03 RX ADMIN — Medication 10 ML: at 20:46

## 2019-08-03 RX ADMIN — POTASSIUM CHLORIDE 20 MEQ: 20 TABLET, EXTENDED RELEASE ORAL at 20:46

## 2019-08-03 ASSESSMENT — PAIN SCALES - GENERAL
PAINLEVEL_OUTOF10: 0

## 2019-08-03 NOTE — PROGRESS NOTES
MOBILITY:  Not completed    TRANSFERS:  Sit to Stand:  Minimal Assistance. from bedside chair with cues for hand placement  Stand to Sit: [employer]. into bedside chair with cues to step all the way back prior to sitting    FUNCTIONAL MOBILITY:  Assistive Device: Rolling Walker  Assist Level:  Contact Guard Assistance. Distance: past nurses station  Pt reporting she was having some increased SOB and fatigue requiring 2 standing restbreaks and a lengthy seated break after completion         ASSESSMENT:  Activity Tolerance:  Patient tolerance of  treatment: fair. Increased SOB with tasks erquiring rest breaks       Discharge Recommendations: (TCU vs SNF pending progress)  Equipment Recommendations: Equipment Needed: Yes  Other: RW, LH AE, ETS  Plan: Times per week: 6x  Current Treatment Recommendations: Balance Training, Self-Care / ADL, Functional Mobility Training, Patient/Caregiver Education & Training, Endurance Training, Safety Education & Training    Patient Education  Patient Education: use of Sidney Blancas for LB ADL tasks     Goals  Short term goals  Time Frame for Short term goals: 2 weeks  Short term goal 1: Complete sit-stand with CGA & min vcs for technique with THR precautions  Short term goal 2: Complete mobility to/from bathroom with CGA, RW, & min vcs for walker safety  Short term goal 3: Tolerate standing 3-4 min with CGA for increased ease of sinkside grooming  Short term goal 4: Complete LE dressing with mod A, LH AE, & min vcs for THR precautions  Long term goals  Time Frame for Long term goals : No LTG set d/t short ELOS    Following session, patient left in safe position with all fall risk precautions in place.

## 2019-08-03 NOTE — PLAN OF CARE
throughout shift. Patient on continuous telemetry monitoring, heart tones, vitals & pulses checked at least 3 times per shift & PRN. Vitals within acceptable limits. Peripheral pulses palpable. Problem: Fluid Volume - Imbalance:  Goal: Absence of imbalanced fluid volume signs and symptoms  Description  Absence of imbalanced fluid volume signs and symptoms  Outcome: Ongoing     Problem: Gas Exchange - Impaired:  Goal: Levels of oxygenation will improve  Description  Levels of oxygenation will improve  Outcome: Ongoing     Problem: Infection - Central Venous Catheter-Associated Bloodstream Infection:  Goal: Will show no infection signs and symptoms  Description  Will show no infection signs and symptoms  Outcome: Ongoing  Note:   Afebrile so far this shift. Care plan reviewed with patient. Patient verbalizes understanding of the care plan and contributed to goal setting.

## 2019-08-03 NOTE — PROGRESS NOTES
(pt would benefit from an inpatient therapy stay prior to discharge home to cont to work on strength, endurance and increasd independence with functional mobility)    Plan: Times per week: 7x BID/O  Times per day: Daily  Specific instructions for Next Treatment: ther ex, ther act, gait trng, transfers  Current Treatment Recommendations: Strengthening, Transfer Training, Endurance Training, ROM, Balance Training, Gait Training, Functional Mobility Training, Stair training, Safety Education & Training, Patient/Caregiver Education & Training, Equipment Evaluation, Education, & procurement    Patient Education  Patient Education: hip precautions    Goals:  Patient goals : go home  Short term goals  Time Frame for Short term goals: at discharge  Short term goal 1: Pt to be Supervision for supine <> sit to get in/out of bed  Short term goal 2: Pt to be Supervision for sit <> stand to get up to ambulate  Short term goal 3: Pt to ambulate > 80 ft with RW with SBA for household distances  Short term goal 4: Pt to negotiate 2 steps with 1 rail with CGA for home access  Short term goal 5: Pt to complete car transfer with Min A for transportation needs  Long term goals  Time Frame for Long term goals : not set due to short ELOS    Following session, patient left in safe position with all fall risk precautions in place.

## 2019-08-04 LAB
ANION GAP SERPL CALCULATED.3IONS-SCNC: 7 MEQ/L (ref 8–16)
ANION GAP SERPL CALCULATED.3IONS-SCNC: 7 MEQ/L (ref 8–16)
BUN BLDV-MCNC: 43 MG/DL (ref 7–22)
CALCIUM SERPL-MCNC: 9.1 MG/DL (ref 8.5–10.5)
CHLORIDE BLD-SCNC: 94 MEQ/L (ref 98–111)
CHLORIDE BLD-SCNC: 96 MEQ/L (ref 98–111)
CO2: 37 MEQ/L (ref 23–33)
CO2: 38 MEQ/L (ref 23–33)
CREAT SERPL-MCNC: 1 MG/DL (ref 0.4–1.2)
ERYTHROCYTE [DISTWIDTH] IN BLOOD BY AUTOMATED COUNT: 17.2 % (ref 11.5–14.5)
ERYTHROCYTE [DISTWIDTH] IN BLOOD BY AUTOMATED COUNT: 56 FL (ref 35–45)
GFR SERPL CREATININE-BSD FRML MDRD: 51 ML/MIN/1.73M2
GLUCOSE BLD-MCNC: 95 MG/DL (ref 70–108)
HCT VFR BLD CALC: 34.6 % (ref 37–47)
HEMOGLOBIN: 11.2 GM/DL (ref 12–16)
MAGNESIUM: 1.6 MG/DL (ref 1.6–2.4)
MCH RBC QN AUTO: 29.9 PG (ref 26–33)
MCHC RBC AUTO-ENTMCNC: 32.4 GM/DL (ref 32.2–35.5)
MCV RBC AUTO: 92.5 FL (ref 81–99)
PLATELET # BLD: 119 THOU/MM3 (ref 130–400)
PMV BLD AUTO: 11.3 FL (ref 9.4–12.4)
POTASSIUM REFLEX MAGNESIUM: 3.9 MEQ/L (ref 3.5–5.2)
POTASSIUM REFLEX MAGNESIUM: 4.7 MEQ/L (ref 3.5–5.2)
POTASSIUM SERPL-SCNC: 3.9 MEQ/L (ref 3.5–5.2)
RBC # BLD: 3.74 MILL/MM3 (ref 4.2–5.4)
SODIUM BLD-SCNC: 139 MEQ/L (ref 135–145)
SODIUM BLD-SCNC: 140 MEQ/L (ref 135–145)
URINE CULTURE, ROUTINE: NORMAL
WBC # BLD: 7.5 THOU/MM3 (ref 4.8–10.8)

## 2019-08-04 PROCEDURE — 97110 THERAPEUTIC EXERCISES: CPT

## 2019-08-04 PROCEDURE — 36415 COLL VENOUS BLD VENIPUNCTURE: CPT

## 2019-08-04 PROCEDURE — 85027 COMPLETE CBC AUTOMATED: CPT

## 2019-08-04 PROCEDURE — 99232 SBSQ HOSP IP/OBS MODERATE 35: CPT | Performed by: INTERNAL MEDICINE

## 2019-08-04 PROCEDURE — 97116 GAIT TRAINING THERAPY: CPT

## 2019-08-04 PROCEDURE — 2580000003 HC RX 258: Performed by: INTERNAL MEDICINE

## 2019-08-04 PROCEDURE — 6370000000 HC RX 637 (ALT 250 FOR IP): Performed by: INTERNAL MEDICINE

## 2019-08-04 PROCEDURE — 6370000000 HC RX 637 (ALT 250 FOR IP): Performed by: NURSE PRACTITIONER

## 2019-08-04 PROCEDURE — 2709999900 HC NON-CHARGEABLE SUPPLY

## 2019-08-04 PROCEDURE — 2140000000 HC CCU INTERMEDIATE R&B

## 2019-08-04 PROCEDURE — 80051 ELECTROLYTE PANEL: CPT

## 2019-08-04 PROCEDURE — 80048 BASIC METABOLIC PNL TOTAL CA: CPT

## 2019-08-04 PROCEDURE — 83735 ASSAY OF MAGNESIUM: CPT

## 2019-08-04 PROCEDURE — 99233 SBSQ HOSP IP/OBS HIGH 50: CPT | Performed by: INTERNAL MEDICINE

## 2019-08-04 PROCEDURE — 2500000003 HC RX 250 WO HCPCS: Performed by: INTERNAL MEDICINE

## 2019-08-04 PROCEDURE — 6370000000 HC RX 637 (ALT 250 FOR IP): Performed by: PHYSICIAN ASSISTANT

## 2019-08-04 RX ORDER — BUMETANIDE 0.25 MG/ML
1 INJECTION, SOLUTION INTRAMUSCULAR; INTRAVENOUS 3 TIMES DAILY
Status: DISCONTINUED | OUTPATIENT
Start: 2019-08-04 | End: 2019-08-05

## 2019-08-04 RX ORDER — LIDOCAINE 4 G/G
1 PATCH TOPICAL DAILY
Status: DISCONTINUED | OUTPATIENT
Start: 2019-08-04 | End: 2019-08-06 | Stop reason: HOSPADM

## 2019-08-04 RX ADMIN — MIDODRINE HYDROCHLORIDE 10 MG: 10 TABLET ORAL at 09:54

## 2019-08-04 RX ADMIN — MAGNESIUM GLUCONATE 500 MG ORAL TABLET 400 MG: 500 TABLET ORAL at 21:38

## 2019-08-04 RX ADMIN — Medication 10 ML: at 21:55

## 2019-08-04 RX ADMIN — MIDODRINE HYDROCHLORIDE 10 MG: 10 TABLET ORAL at 11:36

## 2019-08-04 RX ADMIN — Medication 10 ML: at 10:47

## 2019-08-04 RX ADMIN — MAGNESIUM GLUCONATE 500 MG ORAL TABLET 400 MG: 500 TABLET ORAL at 09:54

## 2019-08-04 RX ADMIN — MIDODRINE HYDROCHLORIDE 10 MG: 10 TABLET ORAL at 16:25

## 2019-08-04 RX ADMIN — BUMETANIDE 1 MG: 0.25 INJECTION INTRAMUSCULAR; INTRAVENOUS at 16:25

## 2019-08-04 RX ADMIN — CARVEDILOL 3.12 MG: 3.12 TABLET, FILM COATED ORAL at 16:25

## 2019-08-04 RX ADMIN — BUMETANIDE 1 MG: 0.25 INJECTION INTRAMUSCULAR; INTRAVENOUS at 09:53

## 2019-08-04 RX ADMIN — POLYETHYLENE GLYCOL 3350 17 G: 17 POWDER, FOR SOLUTION ORAL at 09:54

## 2019-08-04 RX ADMIN — POTASSIUM CHLORIDE 20 MEQ: 20 TABLET, EXTENDED RELEASE ORAL at 09:53

## 2019-08-04 ASSESSMENT — PAIN SCALES - GENERAL
PAINLEVEL_OUTOF10: 0
PAINLEVEL_OUTOF10: 0

## 2019-08-04 NOTE — PROGRESS NOTES
Renal Progress Note    Assessment and Plan:    1. Acute kidney injury improved and stable  2. Pre-renal azotemia from diuretic  3. Volume overload better  4. Cardiomyopathy with low ejection fraction  5. Atrial fibrillation   6. Hypotension stable and due to cardiomyopathy  7. Status post fall with hip fracture  8. Deconditioning  9. Bicytopenia with anemia and thrombocytopenia   10.   Metabolic alkalosis from diuretic    PLAN:  Labs reviewed  Serum creatinine stable  Medications reviewed  Discontinue bumetanide infusion  Bumetanide 1 mg IV 3 times a day  Labs in the morning  We will follow      Patient Active Problem List:     Closed fracture of left hip (HCC)     Hypoxia     Elevated troponin     Elevated CK     Lower extremity edema     Hypotension     Lactic acidosis     MAG (acute kidney injury) ([facility]     Closed left hip fracture, initial encounter ([facility]     Acute congestive heart failure (HCC)     Pre-op evaluation     Traumatic rhabdomyolysis ([address]     Aortic valve stenosis     Mitral valve insufficiency     Mitral valve stenosis     Severe mitral regurgitation by prior echocardiogram     Dilated cardiomyopathy ([facility]     Hyperkalemia      Subjective:   Admit Date: 7/28/2019    Interval History:   Seen for acute kidney injury and volume overload  Very sleepy  Updated by the staff  No new issues  Stable blood pressure goal remains low  Excellent urine output still      Medications:   Scheduled Meds:   bumetanide  1 mg Intravenous TID    carvedilol  3.125 mg Oral BID WC    polyethylene glycol  17 g Oral Daily    magnesium oxide  400 mg Oral BID    potassium chloride  20 mEq Oral BID    lidocaine 1 % injection  5 mL Intradermal Once    sodium chloride flush  10 mL Intravenous 2 times per day    [Held by provider] enoxaparin  30 mg Subcutaneous Daily    midodrine  10 mg Oral TID WC    sodium chloride  500 mL Intravenous Once     Continuous Infusions:   dextrose         CBC:   Recent Labs

## 2019-08-04 NOTE — PLAN OF CARE
within acceptable limits. Peripheral pulses palpable. Problem: Fluid Volume - Imbalance:  Goal: Absence of imbalanced fluid volume signs and symptoms  Description  Absence of imbalanced fluid volume signs and symptoms  Outcome: Ongoing     Problem: Gas Exchange - Impaired:  Goal: Levels of oxygenation will improve  Description  Levels of oxygenation will improve  Outcome: Ongoing  Note:   Lung sounds clear pulse ox in the 90s. Problem: Infection - Central Venous Catheter-Associated Bloodstream Infection:  Goal: Will show no infection signs and symptoms  Description  Will show no infection signs and symptoms  Outcome: Ongoing  Note:   No drainage noted at PICC site. Care plan reviewed with patient. Patient verbalizes understanding of the care plan and contributed to goal setting.

## 2019-08-04 NOTE — PROGRESS NOTES
PAIN: 2/10: low back and left hip     OBJECTIVE:  Bed Mobility:  Supine to Sit: Stand By Assistance, HOB flat and proper technique     Transfers:  Sit to Stand: [employer], from bed cues for left LE placement for hip precautions  Stand to Sit:Contact Guard Assistance    Ambulation:  Contact Guard Assistance, to SBA  Distance: 80x1  Surface: Level Tile  Device:Rolling Walker  Gait Deviations:  Slow heather noted slight decreased stance at left LE     Exercise:  Patient was guided in 1 set(s) 10 reps of exercise to both lower extremities. Ankle pumps, Glut sets, Quad sets, Heelslides, Short arc quads, Hip abduction/adduction and with cues for proper technique of ex. Exercises were completed for increased independence with functional mobility. Functional Outcome Measures: Completed  AM-PAC Inpatient Mobility without Stair Climbing Raw Score : 15  AM-PAC Inpatient without Stair Climbing T-Scale Score : 43.03    ASSESSMENT:  Assessment: Patient progressing toward established goals. Activity Tolerance:  Patient tolerance of  treatment: good. Equipment Recommendations: Other: monitor for needs  Discharge Recommendations:    Discharge Recommendations: (pt would benefit from an inpatient therapy stay prior to discharge home to cont to work on strength, endurance and increasd independence with functional mobility)    Plan: Times per week: 7x BID/O  Times per day: Daily  Specific instructions for Next Treatment: ther ex, ther act, gait trng, transfers  Current Treatment Recommendations: Strengthening, Transfer Training, Endurance Training, ROM, Balance Training, Gait Training, Functional Mobility Training, Stair training, Safety Education & Training, Patient/Caregiver Education & Training, Equipment Evaluation, Education, & procurement    Patient Education  Patient Education: Plan of Care    Goals:  Patient goals : go home  Short term goals  Time Frame for Short term goals: at discharge  Short

## 2019-08-04 NOTE — PROGRESS NOTES
nausea, vomiting, SOB, or chest pain. Medications:  Reviewed    Infusion Medications    dextrose       Scheduled Medications    bumetanide  1 mg Intravenous TID    lidocaine  1 patch Transdermal Daily    carvedilol  3.125 mg Oral BID WC    polyethylene glycol  17 g Oral Daily    magnesium oxide  400 mg Oral BID    potassium chloride  20 mEq Oral BID    lidocaine 1 % injection  5 mL Intradermal Once    sodium chloride flush  10 mL Intravenous 2 times per day    [Held by provider] enoxaparin  30 mg Subcutaneous Daily    midodrine  10 mg Oral TID     sodium chloride  500 mL Intravenous Once     PRN Meds: sodium chloride flush, glucose, dextrose, glucagon (rDNA), dextrose, HYDROcodone 5 mg - acetaminophen **OR** HYDROcodone 5 mg - acetaminophen, morphine **OR** morphine, potassium chloride **OR** potassium alternative oral replacement **OR** potassium chloride, magnesium hydroxide, ondansetron, acetaminophen      Intake/Output Summary (Last 24 hours) at 8/4/2019 5079  Last data filed at 8/4/2019 0342  Gross per 24 hour   Intake 1452.03 ml   Output 5425 ml   Net -3972.97 ml     Weight change: -10 lb (-4.536 kg)      Exam:  BP (!) 92/56   Pulse 79   Temp 97.4 °F (36.3 °C) (Oral)   Resp 18   Ht 5' 5\" (1.651 m)   Wt 149 lb 8 oz (67.8 kg)   SpO2 96%   BMI 24.88 kg/m²     General appearance: No apparent distress, well developed, appears stated age, elderly pleasant and [place]. Eyes:  Pupils equal, round, and reactive to light. Conjunctivae/corneas clear. HENT: Head normal in appearance. External nares normal.  Oral mucosa moist without lesions. Neck: Supple, with full range of motion. +jvd no longer noticeable. Trachea midline. Respiratory:  Normal respiratory effort. No longer rales in bases, good aeration. Cardiovascular: Normal rate, irregular rhythm with normal S1/S2 with 3/6 LUICANA. 1-2+ LE edema but only to mid shin, skin wrinkled from prior edema much improved.    Abdomen: Soft, non-tender, discuss the option for filter retrieval.            **This report has been created using voice recognition software. It may contain minor errors which are inherent in voice recognition technology. **      Final report electronically signed by Dr Natali Jean-Baptiste on 7/30/2019 8:34 AM      VL DUP LOWER EXTREMITY VENOUS BILATERAL   Final Result   Findings are consistent with acute DVT in the right femoral vein. Possible recanalized chronic DVT left common femoral vein               **This report has been created using voice recognition software. It may contain minor errors which are inherent in voice recognition technology. **      Final report electronically signed by Dr. Misty Patterson on 7/29/2019 3:57 PM      XR CHEST PORTABLE   Final Result   Cardiomegaly and diffuse increased interstitial markings may be chronic. No confluent infiltrate or pleural effusion is seen. **This report has been created using voice recognition software. It may contain minor errors which are inherent in voice recognition technology. **      Final report electronically signed by Dr. Misty Patterson on 7/29/2019 10:09 AM      XR HIP 2-3 VW W PELVIS LEFT   Final Result   Moderately displaced subcapital fracture left femoral neck. Questionable mild comminution. **This report has been created using voice recognition software. It may contain minor errors which are inherent in voice recognition technology. **      Final report electronically signed by Dr. Tashi Ro on 7/28/2019 5:53 PM      XR CHEST PORTABLE   Final Result   1. Moderate cardiomegaly. Mild prominence of the central pulmonary vessels, suggesting heart failure. 2. Increased interstitial markings throughout both lungs, consistent with interstitial pneumonia/pulmonary edema. Tiny effusion right side. Question tiny effusion left side. **This report has been created using voice recognition software.   It may contain minor errors which are inherent in voice recognition technology. **      Final report electronically signed by Dr. Gaurav Merchant on 7/28/2019 5:50 PM          DVT prophylaxis: [x] Lovenox if platelets remain stable. Avoid SCDs with recent clot. [] SCDs                                 [] SQ Heparin                                 [] Encourage ambulation            [] Already on Anticoagulation     Code Status: Limited    PT/OT Eval Status: yes    Diet:   DIET GENERAL; Low Sodium (2 GM);  Daily Fluid Restriction: 1800 ml    Fluids: no    Tele:   [x] yes             [] no      Electronically signed by Deborah Flores DO on 8/4/2019 at 8:22 AM

## 2019-08-05 ENCOUNTER — APPOINTMENT (OUTPATIENT)
Dept: GENERAL RADIOLOGY | Age: 84
DRG: 469 | End: 2019-08-05
Payer: MEDICARE

## 2019-08-05 PROBLEM — E43 SEVERE MALNUTRITION (HCC): Status: ACTIVE | Noted: 2019-08-05

## 2019-08-05 LAB
ANION GAP SERPL CALCULATED.3IONS-SCNC: 8 MEQ/L (ref 8–16)
BUN BLDV-MCNC: 44 MG/DL (ref 7–22)
CALCIUM SERPL-MCNC: 8.8 MG/DL (ref 8.5–10.5)
CHLORIDE BLD-SCNC: 94 MEQ/L (ref 98–111)
CO2: 38 MEQ/L (ref 23–33)
CREAT SERPL-MCNC: 0.9 MG/DL (ref 0.4–1.2)
ERYTHROCYTE [DISTWIDTH] IN BLOOD BY AUTOMATED COUNT: 17.3 % (ref 11.5–14.5)
ERYTHROCYTE [DISTWIDTH] IN BLOOD BY AUTOMATED COUNT: 56.8 FL (ref 35–45)
GFR SERPL CREATININE-BSD FRML MDRD: 58 ML/MIN/1.73M2
GLUCOSE BLD-MCNC: 98 MG/DL (ref 70–108)
HCT VFR BLD CALC: 33.7 % (ref 37–47)
HEMOGLOBIN: 10.8 GM/DL (ref 12–16)
MAGNESIUM: 1.6 MG/DL (ref 1.6–2.4)
MCH RBC QN AUTO: 29.8 PG (ref 26–33)
MCHC RBC AUTO-ENTMCNC: 32 GM/DL (ref 32.2–35.5)
MCV RBC AUTO: 92.8 FL (ref 81–99)
PLATELET # BLD: 101 THOU/MM3 (ref 130–400)
PMV BLD AUTO: 10.9 FL (ref 9.4–12.4)
POTASSIUM REFLEX MAGNESIUM: 4.1 MEQ/L (ref 3.5–5.2)
POTASSIUM SERPL-SCNC: 4.1 MEQ/L (ref 3.5–5.2)
RBC # BLD: 3.63 MILL/MM3 (ref 4.2–5.4)
SODIUM BLD-SCNC: 140 MEQ/L (ref 135–145)
WBC # BLD: 7.3 THOU/MM3 (ref 4.8–10.8)

## 2019-08-05 PROCEDURE — 2709999900 HC NON-CHARGEABLE SUPPLY

## 2019-08-05 PROCEDURE — 6360000002 HC RX W HCPCS: Performed by: INTERNAL MEDICINE

## 2019-08-05 PROCEDURE — 80048 BASIC METABOLIC PNL TOTAL CA: CPT

## 2019-08-05 PROCEDURE — 85027 COMPLETE CBC AUTOMATED: CPT

## 2019-08-05 PROCEDURE — 36415 COLL VENOUS BLD VENIPUNCTURE: CPT

## 2019-08-05 PROCEDURE — 6370000000 HC RX 637 (ALT 250 FOR IP): Performed by: INTERNAL MEDICINE

## 2019-08-05 PROCEDURE — 6370000000 HC RX 637 (ALT 250 FOR IP): Performed by: NURSE PRACTITIONER

## 2019-08-05 PROCEDURE — 97110 THERAPEUTIC EXERCISES: CPT

## 2019-08-05 PROCEDURE — 83735 ASSAY OF MAGNESIUM: CPT

## 2019-08-05 PROCEDURE — 97535 SELF CARE MNGMENT TRAINING: CPT

## 2019-08-05 PROCEDURE — 97116 GAIT TRAINING THERAPY: CPT

## 2019-08-05 PROCEDURE — 97530 THERAPEUTIC ACTIVITIES: CPT

## 2019-08-05 PROCEDURE — 2580000003 HC RX 258: Performed by: INTERNAL MEDICINE

## 2019-08-05 PROCEDURE — 71045 X-RAY EXAM CHEST 1 VIEW: CPT

## 2019-08-05 PROCEDURE — 99232 SBSQ HOSP IP/OBS MODERATE 35: CPT | Performed by: INTERNAL MEDICINE

## 2019-08-05 PROCEDURE — 80051 ELECTROLYTE PANEL: CPT

## 2019-08-05 PROCEDURE — 2140000000 HC CCU INTERMEDIATE R&B

## 2019-08-05 RX ORDER — DIGOXIN 125 MCG
125 TABLET ORAL DAILY
Status: DISCONTINUED | OUTPATIENT
Start: 2019-08-05 | End: 2019-08-06 | Stop reason: HOSPADM

## 2019-08-05 RX ORDER — METOPROLOL SUCCINATE 25 MG/1
12.5 TABLET, EXTENDED RELEASE ORAL DAILY
Status: DISCONTINUED | OUTPATIENT
Start: 2019-08-05 | End: 2019-08-06

## 2019-08-05 RX ORDER — BUMETANIDE 1 MG/1
1 TABLET ORAL 2 TIMES DAILY
Status: DISCONTINUED | OUTPATIENT
Start: 2019-08-05 | End: 2019-08-06 | Stop reason: HOSPADM

## 2019-08-05 RX ADMIN — Medication 10 ML: at 10:17

## 2019-08-05 RX ADMIN — POTASSIUM CHLORIDE 20 MEQ: 20 TABLET, EXTENDED RELEASE ORAL at 20:07

## 2019-08-05 RX ADMIN — BUMETANIDE 1 MG: 1 TABLET ORAL at 20:07

## 2019-08-05 RX ADMIN — MAGNESIUM SULFATE HEPTAHYDRATE 1 G: 500 INJECTION, SOLUTION INTRAMUSCULAR; INTRAVENOUS at 10:11

## 2019-08-05 RX ADMIN — POTASSIUM CHLORIDE 20 MEQ: 20 TABLET, EXTENDED RELEASE ORAL at 10:11

## 2019-08-05 RX ADMIN — DIGOXIN 125 MCG: 125 TABLET ORAL at 10:12

## 2019-08-05 RX ADMIN — MAGNESIUM GLUCONATE 500 MG ORAL TABLET 400 MG: 500 TABLET ORAL at 10:12

## 2019-08-05 RX ADMIN — MIDODRINE HYDROCHLORIDE 10 MG: 10 TABLET ORAL at 10:12

## 2019-08-05 RX ADMIN — Medication 10 ML: at 20:08

## 2019-08-05 RX ADMIN — BUMETANIDE 1 MG: 1 TABLET ORAL at 10:12

## 2019-08-05 RX ADMIN — MIDODRINE HYDROCHLORIDE 10 MG: 10 TABLET ORAL at 16:17

## 2019-08-05 RX ADMIN — METOPROLOL SUCCINATE 12.5 MG: 25 TABLET, FILM COATED, EXTENDED RELEASE ORAL at 10:16

## 2019-08-05 RX ADMIN — MAGNESIUM GLUCONATE 500 MG ORAL TABLET 400 MG: 500 TABLET ORAL at 20:07

## 2019-08-05 RX ADMIN — MIDODRINE HYDROCHLORIDE 10 MG: 10 TABLET ORAL at 12:29

## 2019-08-05 ASSESSMENT — PAIN SCALES - GENERAL
PAINLEVEL_OUTOF10: 0

## 2019-08-05 ASSESSMENT — PAIN - FUNCTIONAL ASSESSMENT: PAIN_FUNCTIONAL_ASSESSMENT: ACTIVITIES ARE NOT PREVENTED

## 2019-08-05 NOTE — CARE COORDINATION
8/5/19, 1:57 PM      [facility] day: 8  Location: -28/028-A Reason for admit: Hip fracture, right, closed, initial encounter [facility]) [S72.001A]   Procedure:  7/30 IVC filter placed. 7/30 - open tx femoral neck fracture with a prosthesis cemented.   Treatment Plan of Care: POD #6 Hospitalist, Ortho, Cardiology and Nephrology following. Dobutrex and Bumex gtts have been stopped. Bumex changed to oral. PT/OT. PCP: WALTER Mujica - CNP  Readmission Risk Score: 18%  Discharge Plan: Pt lives at home alone. Plan is for TCU, hopefully tomorrow.    Electronically signed by Ana Theodore RN on 8/5/2019 at 2:00 PM

## 2019-08-05 NOTE — PLAN OF CARE
Problem: Nutrition  Goal: Optimal nutrition therapy  Outcome: Ongoing   Nutrition Problem: Severe malnutrition, In context of chronic illness  Intervention: Food and/or Nutrient Delivery: Continue current diet, Start ONS  Nutritional Goals: Pt. will consume 75% or more at melas during LOS.

## 2019-08-05 NOTE — PROGRESS NOTES
 dextrose       PRN Meds:  sodium chloride flush, glucose, dextrose, glucagon (rDNA), dextrose, HYDROcodone 5 mg - acetaminophen **OR** HYDROcodone 5 mg - acetaminophen, morphine **OR** morphine, potassium chloride **OR** potassium alternative oral replacement **OR** potassium chloride, magnesium hydroxide, ondansetron, acetaminophen    Input/Output:       I/O last 3 completed shifts: In: 65 [P.O.:660; I.V.:20]  Out: 2625 [Urine:2625]. Patient Vitals for the past 96 hrs (Last 3 readings):   Weight   19 0322 151 lb 2 oz (68.5 kg)   19 0536 149 lb 8 oz (67.8 kg)   19 0418 159 lb 8 oz (72.3 kg)       Vital Signs:   Temperature:  Temp: 98.4 °F (36.9 °C)  TMax:   Temp (24hrs), Av.9 °F (36.6 °C), Min:97.6 °F (36.4 °C), Max:98.4 °F (36.9 °C)    Respirations:  Resp: 18  Pulse:   Pulse: 84  BP:    BP: (!) 109/58  BP Range: Systolic (40YWM), HBA:22 , Min:81 , TXP:711       Diastolic (75FCB), DURAN:86, Min:50, Max:61      Physical Examination:     General:  Awake, alert, no acute distress  HEENT: NC/AT/ MMM   Chest:               Clear B/L  Cardiac:  S1 S2 , irregular  Abdomen: Soft, non-tender, +ostomy  Neuro:  No facial droop, No Asterixis  SKIN:  No rashes, good skin turgor. Extremities:  1+ B/L LE edema - improved    Labs:       Recent Labs     19  0520 19  0500 19  0336   WBC 7.6 7.5 7.3   RBC 3.77* 3.74* 3.63*   HGB 11.1* 11.2* 10.8*   HCT 34.7* 34.6* 33.7*   MCV 92.0 92.5 92.8   MCH 29.4 29.9 29.8   MCHC 32.0* 32.4 32.0*   * 119* 101*   MPV 10.7 11.3 10.9      BMP:   Recent Labs     19  0520  19  0500 19  1640 19  0336      < > 140 139 140   K 4.1  4.1   < > 3.9  3.9 4.7 4.1  4.1      < > 96* 94* 94*   CO2 33   < > 37* 38* 38*   BUN 46*  --  43*  --  44*   CREATININE 1.0  --  1.0  --  0.9   GLUCOSE 107  --  95  --  98   CALCIUM 8.8  --  9.1  --  8.8    < > = values in this interval not displayed.       Phosphorus:   No results BID  6. Hypomagnesemia - continue po mag + give 1 gram mag Sulfate IVPB  7. Acute on chronic systolic CHF - s/p Bumex drip and Dobutrex. On low dose beta blocker. BP too low for Ace-I  8. Hypotension - continue with Midodrine  9. Acute right LE DVT s/p IVC filter. Chronic left DVT  10. S/p left hip fracture with repair            Please don't hesitate to call with any questions.   Electronically signed by Ya Perkins DO on 8/5/2019 at 9:58 AM

## 2019-08-05 NOTE — PROGRESS NOTES
agreeable to session. Pt states she would like to go to bed at end of session. PAIN: 0/10:       OBJECTIVE:  Bed Mobility:  Rolling to Left: Contact Guard Assistance   Sit to Supine: Minimal Assistance   Scooting: Contact Guard Assistance    Transfers:  Sit to Stand: Contact Guard Assistance  Stand to Sit:Contact Guard Assistance    Ambulation:  Contact Guard Assistance  Distance: 80ftx1  Surface: Level Tile  Device:Rolling Walker  Gait Deviations: Forward Flexed Posture, Slow Tala, Decreased Step Length Bilaterally, Decreased Weight Shift Left, Decreased Trunk Rotation, Decreased Gait Speed and Decreased Heel Strike Bilaterally    Exercise:  Patient was guided in supine set(s) 10 reps of exercise to both lower extremities. Ankle pumps, Glut sets, Quad sets, Heelslides, Short arc quads, Hip abduction/adduction, Straight leg raises (RLE only). Exercises were completed for increased independence with functional mobility. Functional Outcome Measures: Completed  AM-PAC Inpatient Mobility without Stair Climbing Raw Score : 15  AM-PAC Inpatient without Stair Climbing T-Scale Score : 43.03    ASSESSMENT:  Assessment: Patient progressing toward established goals. Activity Tolerance:  Patient tolerance of  treatment: good. Pt in bed at end of session. Pt had questions during session about TCU and expectations. Equipment Recommendations: Other: monitor for needs  Discharge Recommendations:    Discharge Recommendations: Continue to assess pending progress, Patient would benefit from continued therapy after discharge(TCU)    Plan: Times per week: 7x BID/O  Times per day: Daily  Specific instructions for Next Treatment: ther ex, ther act, gait trng, transfers  Current Treatment Recommendations: Strengthening, Transfer Training, Endurance Training, ROM, Balance Training, Gait Training, Functional Mobility Training, Stair training, Safety Education & Training, Patient/Caregiver Education & Training, Equipment Evaluation, Education, & procurement    Patient Education  Patient Education: Plan of Care, Gait    Goals:  Patient goals : go home  Short term goals  Time Frame for Short term goals: at discharge  Short term goal 1: Pt to be Supervision for supine <> sit to get in/out of bed  Short term goal 2: Pt to be Supervision for sit <> stand to get up to ambulate  Short term goal 3: Pt to ambulate > 80 ft with RW with SBA for household distances  Short term goal 4: Pt to negotiate 2 steps with 1 rail with CGA for home access  Short term goal 5: Pt to complete car transfer with Min A for transportation needs  Long term goals  Time Frame for Long term goals : not set due to short ELOS    Following session, patient left in safe position with all fall risk precautions in place.
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
## 2019-08-05 NOTE — PROGRESS NOTES
Assistance  Standing Balance: Contact Guard Assistance      TRANSFERS:  Sit to Stand:  Contact Guard Assistance. from recliner   Stand to Sit: [employer]. to recliner     FUNCTIONAL MOBILITY:  No mobility done this session pt had jsut finished with PT and had walked with them     ADDITIONAL ACTIVITIES:  .Pt completed BUE strengthening exercises x15 reps x1set this date with a minimal resisitive band  in all joints and all planes in order to increase strength and improve activity tolerance required for functional toilet & shower transfers and ADL routine. Pt tolerated good, requiring very brief rest breaks throughout task. Pt and therapist talked of hip precautions and LHAE to use. Pt tends to bend over to reach feet sitting in recliner with cues needed throughout session. ASSESSMENT:  Activity Tolerance:  Patient tolerance of  treatment: good.        Discharge Recommendations: (TCU vs SNF pending progress)  Equipment Recommendations: Equipment Needed: Yes  Other: RW, LH AE, ETS  Plan: Times per week: 6x  Current Treatment Recommendations: Balance Training, Self-Care / ADL, Functional Mobility Training, Patient/Caregiver Education & Training, Endurance Training, Safety Education & Training    Patient Education  Patient Education: Adaptive ADL Techniques, Energy Conservation, Home Exercise Program and Equipment Education, hip precautions     Goals  Short term goals  Time Frame for Short term goals: 2 weeks  Short term goal 1: Complete sit-stand with CGA & min vcs for technique with THR precautions  Short term goal 2: Complete mobility to/from bathroom with CGA, RW, & min vcs for walker safety  Short term goal 3: Tolerate standing 3-4 min with CGA for increased ease of sinkside grooming  Short term goal 4: Complete LE dressing with mod A, LH AE, & min vcs for THR precautions  Long term goals  Time Frame for Long term goals : No LTG set d/t short ELOS    Following session, patient left in safe

## 2019-08-06 ENCOUNTER — HOSPITAL ENCOUNTER (INPATIENT)
Age: 84
LOS: 13 days | Discharge: HOME HEALTH | DRG: 559 | End: 2019-08-19
Attending: FAMILY MEDICINE | Admitting: FAMILY MEDICINE
Payer: MEDICARE

## 2019-08-06 VITALS
WEIGHT: 147.8 LBS | TEMPERATURE: 98.1 F | OXYGEN SATURATION: 92 % | BODY MASS INDEX: 24.62 KG/M2 | RESPIRATION RATE: 25 BRPM | DIASTOLIC BLOOD PRESSURE: 61 MMHG | HEIGHT: 65 IN | SYSTOLIC BLOOD PRESSURE: 112 MMHG | HEART RATE: 85 BPM

## 2019-08-06 DIAGNOSIS — R09.02 HYPOXIA: ICD-10-CM

## 2019-08-06 DIAGNOSIS — I48.21 PERMANENT ATRIAL FIBRILLATION (HCC): ICD-10-CM

## 2019-08-06 DIAGNOSIS — I35.0 AORTIC VALVE STENOSIS, ETIOLOGY OF CARDIAC VALVE DISEASE UNSPECIFIED: ICD-10-CM

## 2019-08-06 DIAGNOSIS — Z98.890 HISTORY OF HIP SURGERY: ICD-10-CM

## 2019-08-06 DIAGNOSIS — S72.002A CLOSED FRACTURE OF LEFT HIP, INITIAL ENCOUNTER (HCC): Primary | ICD-10-CM

## 2019-08-06 PROBLEM — I82.413 ACUTE DEEP VEIN THROMBOSIS (DVT) OF FEMORAL VEIN OF BOTH LOWER EXTREMITIES (HCC): Status: ACTIVE | Noted: 2019-08-06

## 2019-08-06 PROBLEM — R29.898 MUSCULAR DECONDITIONING: Status: ACTIVE | Noted: 2019-08-06

## 2019-08-06 LAB
ANION GAP SERPL CALCULATED.3IONS-SCNC: 7 MEQ/L (ref 8–16)
BASE EXCESS MIXED: 13.4 MMOL/L (ref -2–3)
BUN BLDV-MCNC: 43 MG/DL (ref 7–22)
CALCIUM SERPL-MCNC: 8.6 MG/DL (ref 8.5–10.5)
CHLORIDE BLD-SCNC: 93 MEQ/L (ref 98–111)
CO2: 38 MEQ/L (ref 23–33)
COLLECTED BY:: ABNORMAL
COMMENT: ABNORMAL
CREAT SERPL-MCNC: 0.9 MG/DL (ref 0.4–1.2)
DEVICE: ABNORMAL
GFR SERPL CREATININE-BSD FRML MDRD: 58 ML/MIN/1.73M2
GLUCOSE BLD-MCNC: 89 MG/DL (ref 70–108)
HCO3, MIXED: 39 MMOL/L (ref 23–28)
MAGNESIUM: 1.7 MG/DL (ref 1.6–2.4)
O2 SAT, MIXED: 61 %
PCO2, MIXED VENOUS: 56 MMHG (ref 41–51)
PH, MIXED: 7.46 (ref 7.31–7.41)
PLATELET # BLD: 114 THOU/MM3 (ref 130–400)
PLATELET # BLD: 120 THOU/MM3 (ref 130–400)
PO2 MIXED: 31 MMHG (ref 25–40)
POTASSIUM SERPL-SCNC: 4.2 MEQ/L (ref 3.5–5.2)
SITE: ABNORMAL
SODIUM BLD-SCNC: 138 MEQ/L (ref 135–145)

## 2019-08-06 PROCEDURE — 6370000000 HC RX 637 (ALT 250 FOR IP): Performed by: INTERNAL MEDICINE

## 2019-08-06 PROCEDURE — 97110 THERAPEUTIC EXERCISES: CPT

## 2019-08-06 PROCEDURE — 6370000000 HC RX 637 (ALT 250 FOR IP): Performed by: NURSE PRACTITIONER

## 2019-08-06 PROCEDURE — 83735 ASSAY OF MAGNESIUM: CPT

## 2019-08-06 PROCEDURE — 97116 GAIT TRAINING THERAPY: CPT

## 2019-08-06 PROCEDURE — 99238 HOSP IP/OBS DSCHRG MGMT 30/<: CPT | Performed by: INTERNAL MEDICINE

## 2019-08-06 PROCEDURE — 97535 SELF CARE MNGMENT TRAINING: CPT

## 2019-08-06 PROCEDURE — 1290000000 HC SEMI PRIVATE OTHER R&B

## 2019-08-06 PROCEDURE — 85049 AUTOMATED PLATELET COUNT: CPT

## 2019-08-06 PROCEDURE — 36415 COLL VENOUS BLD VENIPUNCTURE: CPT

## 2019-08-06 PROCEDURE — 0220000000 HC SKILLED NURSING FACILITY

## 2019-08-06 PROCEDURE — 97530 THERAPEUTIC ACTIVITIES: CPT

## 2019-08-06 PROCEDURE — 2580000003 HC RX 258: Performed by: INTERNAL MEDICINE

## 2019-08-06 PROCEDURE — 82803 BLOOD GASES ANY COMBINATION: CPT

## 2019-08-06 PROCEDURE — 2709999900 HC NON-CHARGEABLE SUPPLY

## 2019-08-06 PROCEDURE — 99232 SBSQ HOSP IP/OBS MODERATE 35: CPT | Performed by: INTERNAL MEDICINE

## 2019-08-06 PROCEDURE — 80048 BASIC METABOLIC PNL TOTAL CA: CPT

## 2019-08-06 RX ORDER — POLYETHYLENE GLYCOL 3350 17 G/17G
17 POWDER, FOR SOLUTION ORAL DAILY
Status: DISCONTINUED | OUTPATIENT
Start: 2019-08-07 | End: 2019-08-19 | Stop reason: HOSPADM

## 2019-08-06 RX ORDER — BUMETANIDE 1 MG/1
1 TABLET ORAL 2 TIMES DAILY
Status: CANCELLED | OUTPATIENT
Start: 2019-08-06

## 2019-08-06 RX ORDER — VIT A/VIT C/VIT E/ZINC/COPPER 4296-226
1 CAPSULE ORAL 2 TIMES DAILY
Status: DISCONTINUED | OUTPATIENT
Start: 2019-08-06 | End: 2019-08-19 | Stop reason: HOSPADM

## 2019-08-06 RX ORDER — ONDANSETRON 2 MG/ML
4 INJECTION INTRAMUSCULAR; INTRAVENOUS EVERY 6 HOURS PRN
Status: CANCELLED | OUTPATIENT
Start: 2019-08-06

## 2019-08-06 RX ORDER — LIDOCAINE 4 G/G
1 PATCH TOPICAL DAILY
Status: CANCELLED | OUTPATIENT
Start: 2019-08-06

## 2019-08-06 RX ORDER — METOPROLOL SUCCINATE 25 MG/1
12.5 TABLET, EXTENDED RELEASE ORAL 2 TIMES DAILY
Status: CANCELLED | OUTPATIENT
Start: 2019-08-06

## 2019-08-06 RX ORDER — POTASSIUM CHLORIDE 20 MEQ/1
20 TABLET, EXTENDED RELEASE ORAL 2 TIMES DAILY
Status: DISCONTINUED | OUTPATIENT
Start: 2019-08-06 | End: 2019-08-08

## 2019-08-06 RX ORDER — ACETAMINOPHEN 325 MG/1
650 TABLET ORAL EVERY 4 HOURS PRN
Status: DISCONTINUED | OUTPATIENT
Start: 2019-08-06 | End: 2019-08-19 | Stop reason: HOSPADM

## 2019-08-06 RX ORDER — DIGOXIN 125 MCG
125 TABLET ORAL DAILY
Status: CANCELLED | OUTPATIENT
Start: 2019-08-06

## 2019-08-06 RX ORDER — DEXTROSE MONOHYDRATE 50 MG/ML
100 INJECTION, SOLUTION INTRAVENOUS PRN
Status: DISCONTINUED | OUTPATIENT
Start: 2019-08-06 | End: 2019-08-06

## 2019-08-06 RX ORDER — METOPROLOL SUCCINATE 25 MG/1
12.5 TABLET, EXTENDED RELEASE ORAL 2 TIMES DAILY
Status: DISCONTINUED | OUTPATIENT
Start: 2019-08-06 | End: 2019-08-08

## 2019-08-06 RX ORDER — POTASSIUM CHLORIDE 20 MEQ/1
40 TABLET, EXTENDED RELEASE ORAL PRN
Status: CANCELLED | OUTPATIENT
Start: 2019-08-06

## 2019-08-06 RX ORDER — NICOTINE POLACRILEX 4 MG
15 LOZENGE BUCCAL PRN
Status: CANCELLED | OUTPATIENT
Start: 2019-08-06

## 2019-08-06 RX ORDER — BUMETANIDE 1 MG/1
1 TABLET ORAL 2 TIMES DAILY
Status: DISCONTINUED | OUTPATIENT
Start: 2019-08-06 | End: 2019-08-19 | Stop reason: HOSPADM

## 2019-08-06 RX ORDER — POTASSIUM CHLORIDE 7.45 MG/ML
10 INJECTION INTRAVENOUS PRN
Status: CANCELLED | OUTPATIENT
Start: 2019-08-06

## 2019-08-06 RX ORDER — DEXTROSE MONOHYDRATE 50 MG/ML
100 INJECTION, SOLUTION INTRAVENOUS PRN
Status: CANCELLED | OUTPATIENT
Start: 2019-08-06

## 2019-08-06 RX ORDER — MIDODRINE HYDROCHLORIDE 10 MG/1
10 TABLET ORAL
Status: DISCONTINUED | OUTPATIENT
Start: 2019-08-06 | End: 2019-08-19 | Stop reason: HOSPADM

## 2019-08-06 RX ORDER — POTASSIUM CHLORIDE 20 MEQ/1
40 TABLET, EXTENDED RELEASE ORAL PRN
Status: DISCONTINUED | OUTPATIENT
Start: 2019-08-06 | End: 2019-08-19 | Stop reason: HOSPADM

## 2019-08-06 RX ORDER — POLYETHYLENE GLYCOL 3350 17 G/17G
17 POWDER, FOR SOLUTION ORAL DAILY
Status: CANCELLED | OUTPATIENT
Start: 2019-08-06

## 2019-08-06 RX ORDER — DEXTROSE MONOHYDRATE 25 G/50ML
12.5 INJECTION, SOLUTION INTRAVENOUS PRN
Status: DISCONTINUED | OUTPATIENT
Start: 2019-08-06 | End: 2019-08-06

## 2019-08-06 RX ORDER — NICOTINE POLACRILEX 4 MG
15 LOZENGE BUCCAL PRN
Status: DISCONTINUED | OUTPATIENT
Start: 2019-08-06 | End: 2019-08-06

## 2019-08-06 RX ORDER — DEXTROSE MONOHYDRATE 25 G/50ML
12.5 INJECTION, SOLUTION INTRAVENOUS PRN
Status: CANCELLED | OUTPATIENT
Start: 2019-08-06

## 2019-08-06 RX ORDER — DIGOXIN 125 MCG
125 TABLET ORAL DAILY
Status: DISCONTINUED | OUTPATIENT
Start: 2019-08-07 | End: 2019-08-19 | Stop reason: HOSPADM

## 2019-08-06 RX ORDER — LIDOCAINE 4 G/G
1 PATCH TOPICAL DAILY
Status: DISCONTINUED | OUTPATIENT
Start: 2019-08-07 | End: 2019-08-19 | Stop reason: HOSPADM

## 2019-08-06 RX ORDER — MIDODRINE HYDROCHLORIDE 10 MG/1
10 TABLET ORAL
Status: CANCELLED | OUTPATIENT
Start: 2019-08-06

## 2019-08-06 RX ORDER — METOPROLOL SUCCINATE 25 MG/1
12.5 TABLET, EXTENDED RELEASE ORAL 2 TIMES DAILY
Status: DISCONTINUED | OUTPATIENT
Start: 2019-08-06 | End: 2019-08-06 | Stop reason: HOSPADM

## 2019-08-06 RX ORDER — ONDANSETRON 2 MG/ML
4 INJECTION INTRAMUSCULAR; INTRAVENOUS EVERY 6 HOURS PRN
Status: DISCONTINUED | OUTPATIENT
Start: 2019-08-06 | End: 2019-08-10

## 2019-08-06 RX ORDER — POTASSIUM CHLORIDE 20 MEQ/1
20 TABLET, EXTENDED RELEASE ORAL 2 TIMES DAILY
Status: CANCELLED | OUTPATIENT
Start: 2019-08-06

## 2019-08-06 RX ORDER — LANOLIN ALCOHOL/MO/W.PET/CERES
300 CREAM (GRAM) TOPICAL DAILY
Status: DISCONTINUED | OUTPATIENT
Start: 2019-08-06 | End: 2019-08-19 | Stop reason: HOSPADM

## 2019-08-06 RX ORDER — POTASSIUM CHLORIDE 7.45 MG/ML
10 INJECTION INTRAVENOUS PRN
Status: DISCONTINUED | OUTPATIENT
Start: 2019-08-06 | End: 2019-08-19 | Stop reason: HOSPADM

## 2019-08-06 RX ORDER — ACETAMINOPHEN 325 MG/1
650 TABLET ORAL EVERY 4 HOURS PRN
Status: CANCELLED | OUTPATIENT
Start: 2019-08-06

## 2019-08-06 RX ADMIN — Medication 10 ML: at 10:14

## 2019-08-06 RX ADMIN — POTASSIUM CHLORIDE 20 MEQ: 20 TABLET, EXTENDED RELEASE ORAL at 21:12

## 2019-08-06 RX ADMIN — MAGNESIUM GLUCONATE 500 MG ORAL TABLET 400 MG: 500 TABLET ORAL at 21:12

## 2019-08-06 RX ADMIN — BUMETANIDE 1 MG: 1 TABLET ORAL at 21:12

## 2019-08-06 RX ADMIN — POTASSIUM CHLORIDE 20 MEQ: 20 TABLET, EXTENDED RELEASE ORAL at 10:10

## 2019-08-06 RX ADMIN — MIDODRINE HYDROCHLORIDE 10 MG: 10 TABLET ORAL at 11:52

## 2019-08-06 RX ADMIN — METOPROLOL SUCCINATE 12.5 MG: 25 TABLET, FILM COATED, EXTENDED RELEASE ORAL at 10:10

## 2019-08-06 RX ADMIN — Medication 10 ML: at 04:16

## 2019-08-06 RX ADMIN — BUMETANIDE 1 MG: 1 TABLET ORAL at 10:11

## 2019-08-06 RX ADMIN — Medication 1 CAPSULE: at 21:13

## 2019-08-06 RX ADMIN — Medication 300 MCG: at 21:12

## 2019-08-06 RX ADMIN — Medication 10 ML: at 10:15

## 2019-08-06 RX ADMIN — DIGOXIN 125 MCG: 125 TABLET ORAL at 10:11

## 2019-08-06 RX ADMIN — MAGNESIUM GLUCONATE 500 MG ORAL TABLET 400 MG: 500 TABLET ORAL at 10:10

## 2019-08-06 RX ADMIN — MIDODRINE HYDROCHLORIDE 10 MG: 10 TABLET ORAL at 17:46

## 2019-08-06 RX ADMIN — METOPROLOL SUCCINATE 12.5 MG: 25 TABLET, FILM COATED, EXTENDED RELEASE ORAL at 21:11

## 2019-08-06 RX ADMIN — POLYETHYLENE GLYCOL 3350 17 G: 17 POWDER, FOR SOLUTION ORAL at 10:10

## 2019-08-06 RX ADMIN — Medication 10 ML: at 10:10

## 2019-08-06 RX ADMIN — MIDODRINE HYDROCHLORIDE 10 MG: 10 TABLET ORAL at 10:10

## 2019-08-06 ASSESSMENT — PAIN SCALES - GENERAL
PAINLEVEL_OUTOF10: 0

## 2019-08-06 ASSESSMENT — PAIN - FUNCTIONAL ASSESSMENT
PAIN_FUNCTIONAL_ASSESSMENT: ACTIVITIES ARE NOT PREVENTED
PAIN_FUNCTIONAL_ASSESSMENT: ACTIVITIES ARE NOT PREVENTED

## 2019-08-06 NOTE — PROGRESS NOTES
Renal Progress Note  [facility]    Patient :  Scot Ta; 80 y.o. MRN# [id number]  Location:  3B-28/028-A  Attending:  Dorene Mariee MD  Admit Date:  7/28/2019   Hospital Day: 9      Subjective:     Nephrology is following the patient for MAG and volume overload. Patient seen and examined. She is feeling well. Denies any SOB. No dizziness. She is going to [facility] today. Feldman was removed yesterday. Voiding without difficulty. Outpatient Medications:     Medications Prior to Admission: Flaxseed, Linseed, (FLAX SEED OIL) 1000 MG CAPS, Take 1,000 mg by mouth daily  Omega-3 Fatty Acids (FISH OIL) 1000 MG CAPS, Take 1,000 mg by mouth daily  Cyanocobalamin (VITAMIN B 12 PO), Take 500 mcg by mouth daily  Biotin 300 MCG TABS, Take 1 tablet by mouth daily  vitamin D 1000 units CAPS, Take 1 capsule by mouth daily  vitamin C (ASCORBIC ACID) 500 MG tablet, Take 500 mg by mouth daily  vitamin E 400 UNIT capsule, Take 400 Units by mouth daily  Multiple Vitamins-Minerals (PRESERVISION AREDS PO), Take 1 tablet by mouth 2 times daily   aspirin 81 MG tablet, Take 81 mg by mouth daily.   Multiple Vitamins-Minerals (MULTIVITAMIN PO), Take 0.5 tablets by mouth daily   lisinopril (PRINIVIL;ZESTRIL) 20 MG tablet, Take 10 mg by mouth daily     Current Medications:     Scheduled Meds:    metoprolol succinate  12.5 mg Oral BID    digoxin  125 mcg Oral Daily    bumetanide  1 mg Oral BID    lidocaine  1 patch Transdermal Daily    polyethylene glycol  17 g Oral Daily    magnesium oxide  400 mg Oral BID    potassium chloride  20 mEq Oral BID    lidocaine 1 % injection  5 mL Intradermal Once    sodium chloride flush  10 mL Intravenous 2 times per day    [Held by provider] enoxaparin  30 mg Subcutaneous Daily    midodrine  10 mg Oral TID WC    sodium chloride  500 mL Intravenous Once     Continuous Infusions:    dextrose       PRN Meds:  sodium chloride flush, glucose, dextrose, glucagon (rDNA), dextrose, Acute right LE DVT s/p IVC filter. Chronic left DVT  10. S/p left hip fracture with repair    Stable for TCU from renal standpoint. Please don't hesitate to call with any questions.   Electronically signed by Jes Deluna DO on 8/6/2019 at 9:26 AM

## 2019-08-06 NOTE — PROGRESS NOTES
Assessment and Plan:        1. Acute on chronic systolic chf- on dig, toprol, bumex; bp too low for ACEI. Good diuresis  2. fx hip- walking with PT. Dr Hermenia Hatchet has seen and recommends TCU. 3. Tape wound- cover      CC:  Pain in hip  HPI:  Pt with fx hip, MR, chf, afib had fx repair. chf periop. Doing better. Walked in brandt 8.5  ROS (12 point review of systems completed. Pertinent positives noted. Otherwise ROS is negative) :   PMH:  Per HPI  SHX:  Lives alone  FHX: nc  Allergies: See above    Medications:     dextrose        metoprolol succinate  12.5 mg Oral BID    digoxin  125 mcg Oral Daily    bumetanide  1 mg Oral BID    lidocaine  1 patch Transdermal Daily    polyethylene glycol  17 g Oral Daily    magnesium oxide  400 mg Oral BID    potassium chloride  20 mEq Oral BID    lidocaine 1 % injection  5 mL Intradermal Once    sodium chloride flush  10 mL Intravenous 2 times per day    [Held by provider] enoxaparin  30 mg Subcutaneous Daily    midodrine  10 mg Oral TID WC    sodium chloride  500 mL Intravenous Once       Vital Signs:   BP (!) 96/56   Pulse 77   Temp 97.5 °F (36.4 °C) (Oral)   Resp 16   Ht 5' 5\" (1.651 m)   Wt 147 lb 12.8 oz (67 kg)   SpO2 94%   BMI 24.60 kg/m²      Intake/Output Summary (Last 24 hours) at 8/6/2019 0609  Last data filed at 8/6/2019 0416  Gross per 24 hour   Intake 410 ml   Output 650 ml   Net -240 ml        General:   Chronically ill appearing    HEENT:  normocephalic and atraumatic. No scleral icterus. PERR. Neck: supple. JVD to angle jaw. . No thyromegaly. Lungs: crackles basesRRR     Abdomen: soft. Nontender. Bowel sounds nl. Ostomy; in place  Cardiac: irregular  Systolic murmur apex  Extremities:  puffy    Skin:  warm and dry. Psych:  Alert and oriented x3. Affect appropriate  Lymph:  No supraclavicular adenopathy. Neurologic:  No focal deficit. No seizures.     Data: (All radiographs, tracings, PFTs, and imaging are personally viewed and

## 2019-08-07 PROCEDURE — 2500000003 HC RX 250 WO HCPCS: Performed by: INTERNAL MEDICINE

## 2019-08-07 PROCEDURE — 97116 GAIT TRAINING THERAPY: CPT

## 2019-08-07 PROCEDURE — 97530 THERAPEUTIC ACTIVITIES: CPT

## 2019-08-07 PROCEDURE — 99232 SBSQ HOSP IP/OBS MODERATE 35: CPT | Performed by: INTERNAL MEDICINE

## 2019-08-07 PROCEDURE — 97166 OT EVAL MOD COMPLEX 45 MIN: CPT

## 2019-08-07 PROCEDURE — 97535 SELF CARE MNGMENT TRAINING: CPT

## 2019-08-07 PROCEDURE — 97162 PT EVAL MOD COMPLEX 30 MIN: CPT

## 2019-08-07 PROCEDURE — 6370000000 HC RX 637 (ALT 250 FOR IP): Performed by: INTERNAL MEDICINE

## 2019-08-07 PROCEDURE — 1290000000 HC SEMI PRIVATE OTHER R&B

## 2019-08-07 PROCEDURE — 97110 THERAPEUTIC EXERCISES: CPT

## 2019-08-07 RX ORDER — ACETAZOLAMIDE 250 MG/1
250 TABLET ORAL DAILY
Status: COMPLETED | OUTPATIENT
Start: 2019-08-07 | End: 2019-08-08

## 2019-08-07 RX ADMIN — MIDODRINE HYDROCHLORIDE 10 MG: 10 TABLET ORAL at 17:51

## 2019-08-07 RX ADMIN — POTASSIUM CHLORIDE 20 MEQ: 20 TABLET, EXTENDED RELEASE ORAL at 08:16

## 2019-08-07 RX ADMIN — ACETAZOLAMIDE 250 MG: 250 TABLET ORAL at 14:23

## 2019-08-07 RX ADMIN — Medication 300 MCG: at 08:17

## 2019-08-07 RX ADMIN — METOPROLOL SUCCINATE 12.5 MG: 25 TABLET, FILM COATED, EXTENDED RELEASE ORAL at 20:27

## 2019-08-07 RX ADMIN — POLYETHYLENE GLYCOL 3350 17 G: 17 POWDER, FOR SOLUTION ORAL at 08:16

## 2019-08-07 RX ADMIN — MIDODRINE HYDROCHLORIDE 10 MG: 10 TABLET ORAL at 08:17

## 2019-08-07 RX ADMIN — BUMETANIDE 1 MG: 1 TABLET ORAL at 20:27

## 2019-08-07 RX ADMIN — MIDODRINE HYDROCHLORIDE 10 MG: 10 TABLET ORAL at 13:22

## 2019-08-07 RX ADMIN — MAGNESIUM GLUCONATE 500 MG ORAL TABLET 400 MG: 500 TABLET ORAL at 08:16

## 2019-08-07 RX ADMIN — MAGNESIUM GLUCONATE 500 MG ORAL TABLET 400 MG: 500 TABLET ORAL at 20:27

## 2019-08-07 RX ADMIN — POTASSIUM CHLORIDE 20 MEQ: 20 TABLET, EXTENDED RELEASE ORAL at 20:27

## 2019-08-07 RX ADMIN — METOPROLOL SUCCINATE 12.5 MG: 25 TABLET, FILM COATED, EXTENDED RELEASE ORAL at 08:16

## 2019-08-07 RX ADMIN — Medication 1 CAPSULE: at 08:39

## 2019-08-07 RX ADMIN — BUMETANIDE 1 MG: 1 TABLET ORAL at 08:17

## 2019-08-07 RX ADMIN — DIGOXIN 125 MCG: 125 TABLET ORAL at 08:17

## 2019-08-07 RX ADMIN — Medication 5 UNITS: at 08:25

## 2019-08-07 ASSESSMENT — PAIN SCALES - GENERAL
PAINLEVEL_OUTOF10: 0

## 2019-08-07 NOTE — PROGRESS NOTES
Renal Progress Note  [facility]    Patient :  Sobia Cantor; 80 y.o. MRN# [id number]  Location:  8E-70/070-A  Attending:  Pop Carlin MD  Admit Date:  2019   Hospital Day: 1      Subjective:     Nephrology is following the patient for diuretic management and CKD. Patient seen and examined on TCU. Feeling well. Edema improving. No complaints. Outpatient Medications:     Medications Prior to Admission: [] HYDROcodone-acetaminophen (NORCO) 5-325 MG per tablet, Take 1-2 tablets by mouth every 4-6 hours as needed for Pain for up to 7 days. enoxaparin (LOVENOX) 40 MG/0.4ML injection, Inject 0.4 mLs into the skin daily  Flaxseed, Linseed, (FLAX SEED OIL) 1000 MG CAPS, Take 1,000 mg by mouth daily  Omega-3 Fatty Acids (FISH OIL) 1000 MG CAPS, Take 1,000 mg by mouth daily  Cyanocobalamin (VITAMIN B 12 PO), Take 500 mcg by mouth daily  Biotin 300 MCG TABS, Take 1 tablet by mouth daily  vitamin D 1000 units CAPS, Take 1 capsule by mouth daily  vitamin C (ASCORBIC ACID) 500 MG tablet, Take 500 mg by mouth daily  vitamin E 400 UNIT capsule, Take 400 Units by mouth daily  Multiple Vitamins-Minerals (PRESERVISION AREDS PO), Take 1 tablet by mouth 2 times daily   aspirin 81 MG tablet, Take 81 mg by mouth daily.   Multiple Vitamins-Minerals (MULTIVITAMIN PO), Take 0.5 tablets by mouth daily   lisinopril (PRINIVIL;ZESTRIL) 20 MG tablet, Take 10 mg by mouth daily     Current Medications:     Scheduled Meds:    acetaZOLAMIDE  250 mg Oral Daily    bumetanide  1 mg Oral BID    digoxin  125 mcg Oral Daily    lidocaine  1 patch Transdermal Daily    magnesium oxide  400 mg Oral BID    metoprolol succinate  12.5 mg Oral BID    midodrine  10 mg Oral TID WC    polyethylene glycol  17 g Oral Daily    potassium chloride  20 mEq Oral BID    [START ON 2019] ppd   Does not apply Weekly    tuberculin  5 Units Intradermal Weekly    pneumococcal 13-valent conjugate  0.5 mL Intramuscular PROT 5.5 08/03/2019     UPEP:   No results found for: LABPE  C3:   No results found for: C3  C4:   No results found for: C4  MPO ANCA:   No results found for: MPO  PR3 ANCA:   No results found for: PR3  Anti-GBM:   No results found for: GBMABIGG  Hep BsAg:         Lab Results   Component Value Date    HEPBSAG Negative 08/01/2019     Hep C AB:          Lab Results   Component Value Date    HEPCAB Negative 08/01/2019       Urinalysis/Chemistries:      Lab Results   Component Value Date    NITRU NEGATIVE 08/02/2019    COLORU YELLOW 08/02/2019    PHUR 5.0 08/02/2019    LABCAST NONE SEEN 08/02/2019    LABCAST NONE SEEN 08/02/2019    WBCUA 0-2 08/02/2019    RBCUA 10-15 08/02/2019    MUCUS NONE SEEN 07/31/2019    YEAST NONE SEEN 08/02/2019    BACTERIA NONE 08/02/2019    SPECGRAV 1.010 08/02/2019    LEUKOCYTESUR TRACE 08/02/2019    UROBILINOGEN 1.0 08/02/2019    BILIRUBINUR NEGATIVE 08/02/2019    BLOODU MODERATE 08/02/2019    KETUA NEGATIVE 08/02/2019    AMORPHOUS DEBRIS 07/31/2019     Urine Sodium:   No results found for: MAGALY  Urine Potassium:  No results found for: KUR  Urine Chloride:  No results found for: CLUR  Urine Osmolarity: No results found for: OSMOU  Urine Protein:   No components found for: TOTALPROTEIN, URINE   Urine Creatinine:   No results found for: LABCREA  Urine Eosinophils:  No components found for: UEOS        Impression and Plan:  1. CKD IIIa from hypertension and senile nephrosclerosis. Previous MAG from hypotension  2. Volume overload - improving - now on Bumex 1 mg po BID,  Check daily weights  3. Metabolic alkalosis from diuretic - blood gas reviewed, will give Acetazolamide for 2 doses  4. Hypokalemia - continue with KCl 20 meq BID  5. Hypomagnesemia - continue po mag   6. Acute on chronic systolic CHF   7. Hypotension - continue with Midodrine  8. Acute right LE DVT s/p IVC filter. Chronic left DVT  9.   S/p left hip fracture with repair                Please don't hesitate to call with any

## 2019-08-07 NOTE — PROGRESS NOTES
Education & Training, Patient/Caregiver Education & Training, Equipment Evaluation, Education, & procurement, Home Exercise Program    Goals:  Patient goals : get back home soon  Short term goals  Time Frame for Short term goals: 10 days  Short term goal 1: Pt to be CGA, maintaining hip precautions, for supine <> sit to get in/out of bed  Short term goal 2: Pt to be SBA for sit <> stand to get up to ambulate  Short term goal 3: Pt to ambulate > 50 ft with RW with SBA for household distances  Short term goal 4: Pt to negotiate 1 step with RW, SBA, for progression to home entry  Short term goal 5: Pt to complete car transfer with CGA for transportation needs  Short term goal 6: Pt to ascend/descend 4 steps with cristiana rails, CGA, to progress to home entry  Short term goal 6: Pt to ascend/descend 4 steps with cristiana rails, CGA, to progress to home entry  Long term goals  Time Frame for Long term goals : 3 wks  Long term goal 1: Pt to be Mod I, maintaining hip precautions, for supine <> sit to get in/out of bed  Long term goal 2: Pt to be Mod I, for sit <> stand to get up to ambulate  Long term goal 3: Pt to ambulate >=100 ft with RW with SBA for community mobility, >= 50 ft, Mod I for home mobility  Long term goal 4: Pt to ascend/descend 4+ steps with critsiana rails, Mod I;  curb step with RW, Mod I, for home entry  Long term goal 5: Pt to complete car transfer with SB A for transportation needs    Following session, patient left in safe position with all fall risk precautions in place.

## 2019-08-07 NOTE — PROGRESS NOTES
Yeimy Lopez 60  INPATIENT OCCUPATIONAL THERAPY  [facility] SKILLED NURSING UNIT  EVALUATION      Time In: 0945  Time Out: 1100  Timed Code Treatment Minutes: 60 Minutes  Minutes: 75          Date: 2019  Patient Name: Timoteo Irizarry,   Gender: female      MRN: [id number]  : 1924  (89 y.o.)  Referring Practitioner: Jin Merino MD; Attending: Dr. Jacquelin Flores  Diagnosis: L Femoral Neck Fx s/p repair  Additional Pertinent Hx: Per ER note on 19: 80 y.o. female with a past medical history of HTN. The patient presents to the ED via EMS for evaluation of left leg weakness. The patient states that two days ago she felt a pain in her left thigh which was present for a couple hours and then resolved. The patient has not had any pain in her thigh since, however, began to have weakness in the left leg today. She reports having difficulty walking due to this weakness. s/p open tx femoral neck fracture with a prosthesis cemented on 19. Transfer to [facility] 19.     Restrictions/Precautions:  Restrictions/Precautions: Weight Bearing, Fall Risk, General Precautions, Surgical Protocols  Left Lower Extremity Weight Bearing: Weight Bearing As Tolerated  Position Activity Restriction  Hip Precautions: No hip flexion > 90 degrees, No hip external rotation, No hip internal rotation, No ADduction  Other position/activity restrictions: THR precautions    Subjective  Chart Reviewed: Yes, Orders, Progress Notes, History and Physical, Previous Admission, Operative Notes, Imaging  Family / Caregiver Present: Yes    Subjective: cooperative, pleasant, motivated    Pain:       Social/Functional History:  Lives With: Alone  Type of Home: House  Home Layout: One level, Laundry in basement, Work area in basement(treadmill and bicycle downstairs)  Home Access: Stairs to enter with rails  Entrance Stairs - Number of Steps: 2 steps with a rail   Bathroom Shower/Tub: Tub/Shower unit(typically bathes in bath-tub)  Bathroom

## 2019-08-08 LAB
ANION GAP SERPL CALCULATED.3IONS-SCNC: 8 MEQ/L (ref 8–16)
BUN BLDV-MCNC: 40 MG/DL (ref 7–22)
CALCIUM SERPL-MCNC: 8.9 MG/DL (ref 8.5–10.5)
CHLORIDE BLD-SCNC: 94 MEQ/L (ref 98–111)
CO2: 36 MEQ/L (ref 23–33)
CREAT SERPL-MCNC: 0.9 MG/DL (ref 0.4–1.2)
GFR SERPL CREATININE-BSD FRML MDRD: 58 ML/MIN/1.73M2
GLUCOSE BLD-MCNC: 92 MG/DL (ref 70–108)
POTASSIUM SERPL-SCNC: 5.2 MEQ/L (ref 3.5–5.2)
SODIUM BLD-SCNC: 138 MEQ/L (ref 135–145)

## 2019-08-08 PROCEDURE — 97530 THERAPEUTIC ACTIVITIES: CPT

## 2019-08-08 PROCEDURE — 6370000000 HC RX 637 (ALT 250 FOR IP): Performed by: INTERNAL MEDICINE

## 2019-08-08 PROCEDURE — 97116 GAIT TRAINING THERAPY: CPT

## 2019-08-08 PROCEDURE — 2580000003 HC RX 258: Performed by: INTERNAL MEDICINE

## 2019-08-08 PROCEDURE — 97110 THERAPEUTIC EXERCISES: CPT

## 2019-08-08 PROCEDURE — 6370000000 HC RX 637 (ALT 250 FOR IP): Performed by: FAMILY MEDICINE

## 2019-08-08 PROCEDURE — 80048 BASIC METABOLIC PNL TOTAL CA: CPT

## 2019-08-08 PROCEDURE — 97535 SELF CARE MNGMENT TRAINING: CPT

## 2019-08-08 PROCEDURE — 1290000000 HC SEMI PRIVATE OTHER R&B

## 2019-08-08 PROCEDURE — 36415 COLL VENOUS BLD VENIPUNCTURE: CPT

## 2019-08-08 RX ORDER — SODIUM CHLORIDE 9 MG/ML
INJECTION, SOLUTION INTRAVENOUS CONTINUOUS
Status: ACTIVE | OUTPATIENT
Start: 2019-08-08 | End: 2019-08-08

## 2019-08-08 RX ORDER — SENNA PLUS 8.6 MG/1
1 TABLET ORAL NIGHTLY PRN
Status: DISCONTINUED | OUTPATIENT
Start: 2019-08-08 | End: 2019-08-19 | Stop reason: HOSPADM

## 2019-08-08 RX ORDER — 0.9 % SODIUM CHLORIDE 0.9 %
250 INTRAVENOUS SOLUTION INTRAVENOUS ONCE
Status: COMPLETED | OUTPATIENT
Start: 2019-08-08 | End: 2019-08-08

## 2019-08-08 RX ORDER — FAMOTIDINE 20 MG/1
20 TABLET, FILM COATED ORAL DAILY
Status: DISCONTINUED | OUTPATIENT
Start: 2019-08-08 | End: 2019-08-19 | Stop reason: HOSPADM

## 2019-08-08 RX ORDER — POTASSIUM CHLORIDE 20 MEQ/1
20 TABLET, EXTENDED RELEASE ORAL DAILY
Status: DISCONTINUED | OUTPATIENT
Start: 2019-08-09 | End: 2019-08-19 | Stop reason: HOSPADM

## 2019-08-08 RX ORDER — METOPROLOL SUCCINATE 25 MG/1
12.5 TABLET, EXTENDED RELEASE ORAL DAILY
Status: DISCONTINUED | OUTPATIENT
Start: 2019-08-08 | End: 2019-08-19 | Stop reason: HOSPADM

## 2019-08-08 RX ADMIN — Medication 1 CAPSULE: at 09:16

## 2019-08-08 RX ADMIN — SODIUM CHLORIDE 250 ML: 9 INJECTION, SOLUTION INTRAVENOUS at 19:20

## 2019-08-08 RX ADMIN — MIDODRINE HYDROCHLORIDE 10 MG: 10 TABLET ORAL at 09:12

## 2019-08-08 RX ADMIN — MAGNESIUM GLUCONATE 500 MG ORAL TABLET 400 MG: 500 TABLET ORAL at 09:12

## 2019-08-08 RX ADMIN — BUMETANIDE 1 MG: 1 TABLET ORAL at 09:12

## 2019-08-08 RX ADMIN — ACETAZOLAMIDE 250 MG: 250 TABLET ORAL at 09:12

## 2019-08-08 RX ADMIN — MAGNESIUM GLUCONATE 500 MG ORAL TABLET 400 MG: 500 TABLET ORAL at 20:27

## 2019-08-08 RX ADMIN — FAMOTIDINE 20 MG: 20 TABLET ORAL at 20:27

## 2019-08-08 RX ADMIN — DIGOXIN 125 MCG: 125 TABLET ORAL at 09:12

## 2019-08-08 RX ADMIN — Medication 300 MCG: at 09:13

## 2019-08-08 RX ADMIN — Medication 1 CAPSULE: at 20:28

## 2019-08-08 RX ADMIN — MIDODRINE HYDROCHLORIDE 10 MG: 10 TABLET ORAL at 17:54

## 2019-08-08 RX ADMIN — POLYETHYLENE GLYCOL 3350 17 G: 17 POWDER, FOR SOLUTION ORAL at 09:12

## 2019-08-08 RX ADMIN — METOPROLOL SUCCINATE 12.5 MG: 25 TABLET, FILM COATED, EXTENDED RELEASE ORAL at 11:12

## 2019-08-08 RX ADMIN — SODIUM CHLORIDE: 9 INJECTION, SOLUTION INTRAVENOUS at 21:31

## 2019-08-08 RX ADMIN — MIDODRINE HYDROCHLORIDE 10 MG: 10 TABLET ORAL at 13:08

## 2019-08-08 ASSESSMENT — PAIN SCALES - GENERAL
PAINLEVEL_OUTOF10: 0
PAINLEVEL_OUTOF10: 0

## 2019-08-08 NOTE — PROGRESS NOTES
Pt to negotiate 1 step with RW, SBA, for progression to home entry  Short term goal 5: Pt to complete car transfer with CGA for transportation needs  Short term goal 6: Pt to ascend/descend 4 steps with cristiana rails, CGA, to progress to home entry  Short term goal 6: Pt to ascend/descend 4 steps with cristiana rails, CGA, to progress to home entry  Long term goals  Time Frame for Long term goals : 3 wks  Long term goal 1: Pt to be Mod I, maintaining hip precautions, for supine <> sit to get in/out of bed  Long term goal 2: Pt to be Mod I, for sit <> stand to get up to ambulate  Long term goal 3: Pt to ambulate >=100 ft with RW with SBA for community mobility, >= 50 ft, Mod I for home mobility  Long term goal 4: Pt to ascend/descend 4+ steps with cristiana rails, Mod I;  curb step with RW, Mod I, for home entry  Long term goal 5: Pt to complete car transfer with SB A for transportation needs    Following session, patient left in safe position with all fall risk precautions in place.

## 2019-08-08 NOTE — PLAN OF CARE
Problem: Nutrition  Goal: Optimal nutrition therapy  Outcome: Ongoing   Nutrition Problem: Severe malnutrition, In context of chronic illness  Intervention: Food and/or Nutrient Delivery: Continue current diet, Modify current ONS, Vitamin Supplement(Recommend a Multivitamin w/minerals daily.  Increased Magic Cup to TID)  Nutritional Goals: Pt will consume 75% or more of meals during LOS

## 2019-08-08 NOTE — PROGRESS NOTES
RW, & min vcs for walker safety to increase indep with accessing environment during ADLs. Short term goal 3: Pt will tolerate dynamic standing task x 5 min with SBA for increased ease of sinkside grooming. Short term goal 4: Complete LE dressing with Demetrio, LHAE, & min vcs for THR precautions to increase indep with self care. Long term goals  Time Frame for Long term goals : 2 weeks  Long term goal 1: Pt will complete ADL routine including shower t/f with Justo to increase indep with self care. Long term goal 2: Pt will complete light IADL task with Justo to restore PLOF with cooking and cleaning. Following session, patient left in safe position with all fall risk precautions in place.

## 2019-08-08 NOTE — CONSULTS
report)  · Oral Nutrition Supplement (ONS) Orders: Frozen Oral Supplement(Chocolate Magic Cup TID)  · ONS intake: %(per chart review and per pt report)  · Anthropometric Measures:  · Ht: 5' 5\" (165.1 cm)   · Current Body Wt: 144 lb 4.8 oz (65.5 kg)(8/8; +2 pitting edema BLE)  · Admission Body Wt: 147 lb 9.6 oz (67 kg)(8/7; +2 pitting edema BLE)  · Usual Body Wt: (pt unsure. 183 lb 13.8 oz on 8/1- +1 LUE, +4 RLE, +3 LLE edema noted- hx CHF)  · % Weight Change:  -17.5% wt. loss in one week (suspect po & fluid related), CHF, s/p hip OR 7/30)  · Ideal Body Wt: 125 lb (56.7 kg)  · BMI Classification: BMI 18.5 - 24.9 Normal Weight(24.1)    Nutrition Interventions:   Continue current diet, Modify current ONS, Vitamin Supplement(Recommend a Multivitamin w/minerals daily.  Increased Magic Cup to TID.)  Continued Inpatient Monitoring, Education Initiated, Coordination of Care(Encouraged po intake and protein sources at best effort)    Nutrition Evaluation:   · Evaluation: Goals set   · Goals: Pt will consume 75% or more of meals during LOS    · Monitoring: Nutrition Progression, Meal Intake, Supplement Intake, Wound Healing, Skin Integrity, Weight, Pertinent Labs, Monitor Bowel Function    Electronically signed by Dylan Johns RD, LD on 8/8/19 at 2:19 PM    Contact Number: [phone]

## 2019-08-08 NOTE — PLAN OF CARE
Problem: Falls - Risk of:  Goal: Will remain free from falls  Description  Will remain free from falls  Outcome: Ongoing  Pt remains free of falls call light and personal belongings within reach. Non skid sock, gait belt, and front wheeled walker used when ambulating. Telesitter in place for being impulsive and to keep patient safe. Problem: Falls - Risk of:  Goal: Absence of physical injury  Description  Absence of physical injury  Outcome: Ongoing  Patient has a telesitter in place for safety. Patient does not use call light appropriately and has to be redirected. Problem: Pain:  Goal: Pain level will decrease  Description  Pain level will decrease  Outcome: Ongoing  Pt does not complain of pain. Problem: Mobility - Impaired:  Goal: Mobility will improve  Description  Mobility will improve  Outcome: Ongoing  Patient able to ambulate with 1 assist and a walker. Problem: Activity:  Goal: Ability to ambulate will improve  Description  Ability to ambulate will improve  Outcome: Ongoing  Patient tires out easily so therefore we continue to work on endurance. Problem: Safety:  Goal: Ability to remain free from injury will improve  Description  Ability to remain free from injury will improve  Outcome: Ongoing  This nurse will continue to educate patient on safety and the importance of using othe call light appropriately. Problem: Skin Integrity:  Goal: Demonstration of wound healing without infection will improve  Description  Demonstration of wound healing without infection will improve  Outcome: Ongoing  Patient able to reposition self in chair and in bed. Chair cushion in place. Problem: Tissue Perfusion:  Goal: Peripheral tissue perfusion will improve  Description  Peripheral tissue perfusion will improve  Outcome: Ongoing  Pt is on diuretics for fluid volume overload. Pt also has 1800 fluid restriction to try and control fluid volume overload. Plus 2 pitting in bilateral lower extremities.  Will

## 2019-08-09 LAB
ANION GAP SERPL CALCULATED.3IONS-SCNC: 12 MEQ/L (ref 8–16)
BUN BLDV-MCNC: 39 MG/DL (ref 7–22)
CALCIUM SERPL-MCNC: 8.8 MG/DL (ref 8.5–10.5)
CHLORIDE BLD-SCNC: 96 MEQ/L (ref 98–111)
CO2: 29 MEQ/L (ref 23–33)
CREAT SERPL-MCNC: 0.9 MG/DL (ref 0.4–1.2)
DIGOXIN LEVEL: 0.7 NG/ML (ref 0.5–2)
GFR SERPL CREATININE-BSD FRML MDRD: 58 ML/MIN/1.73M2
GLUCOSE BLD-MCNC: 91 MG/DL (ref 70–108)
POTASSIUM SERPL-SCNC: 3.9 MEQ/L (ref 3.5–5.2)
SODIUM BLD-SCNC: 137 MEQ/L (ref 135–145)

## 2019-08-09 PROCEDURE — 97535 SELF CARE MNGMENT TRAINING: CPT

## 2019-08-09 PROCEDURE — 97530 THERAPEUTIC ACTIVITIES: CPT

## 2019-08-09 PROCEDURE — 80162 ASSAY OF DIGOXIN TOTAL: CPT

## 2019-08-09 PROCEDURE — 80048 BASIC METABOLIC PNL TOTAL CA: CPT

## 2019-08-09 PROCEDURE — 1290000000 HC SEMI PRIVATE OTHER R&B

## 2019-08-09 PROCEDURE — 6370000000 HC RX 637 (ALT 250 FOR IP): Performed by: INTERNAL MEDICINE

## 2019-08-09 PROCEDURE — 6370000000 HC RX 637 (ALT 250 FOR IP): Performed by: FAMILY MEDICINE

## 2019-08-09 PROCEDURE — 36415 COLL VENOUS BLD VENIPUNCTURE: CPT

## 2019-08-09 PROCEDURE — 97116 GAIT TRAINING THERAPY: CPT

## 2019-08-09 PROCEDURE — 97110 THERAPEUTIC EXERCISES: CPT

## 2019-08-09 RX ADMIN — MAGNESIUM GLUCONATE 500 MG ORAL TABLET 400 MG: 500 TABLET ORAL at 09:54

## 2019-08-09 RX ADMIN — MIDODRINE HYDROCHLORIDE 10 MG: 10 TABLET ORAL at 13:25

## 2019-08-09 RX ADMIN — Medication 1 CAPSULE: at 21:14

## 2019-08-09 RX ADMIN — MAGNESIUM GLUCONATE 500 MG ORAL TABLET 400 MG: 500 TABLET ORAL at 21:13

## 2019-08-09 RX ADMIN — FAMOTIDINE 20 MG: 20 TABLET ORAL at 09:54

## 2019-08-09 RX ADMIN — BUMETANIDE 1 MG: 1 TABLET ORAL at 09:54

## 2019-08-09 RX ADMIN — Medication 300 MCG: at 09:58

## 2019-08-09 RX ADMIN — METOPROLOL SUCCINATE 12.5 MG: 25 TABLET, FILM COATED, EXTENDED RELEASE ORAL at 09:55

## 2019-08-09 RX ADMIN — POTASSIUM CHLORIDE 20 MEQ: 20 TABLET, EXTENDED RELEASE ORAL at 09:55

## 2019-08-09 RX ADMIN — MIDODRINE HYDROCHLORIDE 10 MG: 10 TABLET ORAL at 18:13

## 2019-08-09 RX ADMIN — DIGOXIN 125 MCG: 125 TABLET ORAL at 09:54

## 2019-08-09 RX ADMIN — MIDODRINE HYDROCHLORIDE 10 MG: 10 TABLET ORAL at 09:55

## 2019-08-09 RX ADMIN — POLYETHYLENE GLYCOL 3350 17 G: 17 POWDER, FOR SOLUTION ORAL at 09:55

## 2019-08-09 ASSESSMENT — PAIN SCALES - GENERAL: PAINLEVEL_OUTOF10: 0

## 2019-08-09 NOTE — H&P
TCU History and Physical      Chief Complaint and Reason for TCU Admission:   Need to continue the post op therapies    History of Present Illness:  Roslyn Lehman  is a 80 y.o. female admitted to the transitional care unit on 8/6/2019. She had a fall and that led to repair of the left hip. She was high risk for the surgery but did well other than some post op CHF. She is now feeling some better and anxious to return home. Past Medical History:      Diagnosis Date    Cancer [facility])     Rectal Cancer    GERD (gastroesophageal reflux disease)     History of rectal cancer 2008    Hypertension        Primary care provider: Jadyn Flores. Trent Ac, APRN - CNP     Past Surgical History:      Procedure Laterality Date    COLONOSCOPY      COLOSTOMY      COLOSTOMY Left     2008    PARTIAL HYSTERECTOMY      TONSILLECTOMY      TOTAL HIP ARTHROPLASTY Left 7/30/2019    LEFT GERMAIN HIP performed by Leslie Edouard MD at [address] [facility]:    Patient has no known allergies.     Current Medications:    Current Facility-Administered Medications: [START ON 8/9/2019] potassium chloride (KLOR-CON M) extended release tablet 20 mEq, 20 mEq, Oral, Daily  metoprolol succinate (TOPROL XL) extended release tablet 12.5 mg, 12.5 mg, Oral, Daily  0.9 % sodium chloride infusion, , Intravenous, Continuous  0.9 % sodium chloride bolus, 250 mL, Intravenous, Once  famotidine (PEPCID) tablet 20 mg, 20 mg, Oral, Daily  senna (SENOKOT) tablet 8.6 mg, 1 tablet, Oral, Nightly PRN  acetaminophen (TYLENOL) tablet 650 mg, 650 mg, Oral, Q4H PRN  magnesium hydroxide (MILK OF MAGNESIA) 400 MG/5ML suspension 30 mL, 30 mL, Oral, Daily PRN  ondansetron (ZOFRAN) injection 4 mg, 4 mg, Intravenous, Q6H PRN  potassium chloride (KLOR-CON M) extended release tablet 40 mEq, 40 mEq, Oral, PRN **OR** potassium bicarb-citric acid (EFFER-K) effervescent tablet 40 mEq, 40 mEq, Oral, PRN **OR** potassium chloride 10 mEq/100 mL IVPB (Peripheral Line), 10 mEq,

## 2019-08-09 NOTE — PROGRESS NOTES
Assistance. FUNCTIONAL MOBILITY:  Assistive Device: Rolling Walker  Assist Level:  Stand By Assistance. Distance: To and from therapy apartment  On/off elevator     ADDITIONAL ACTIVITIES:  Patient completed IADL homemaking task this date of laundry (loading washer). Patient was able to retrieve all items from cupboard with CGA and Min VCs for Hip precautions during the retrieval and transportation of items. Patient required CGA throughout task with a standing endurance of x2 minutes. ASSESSMENT:  Activity Tolerance:  Patient tolerance of  treatment: good. Discharge Recommendations: Home with Home health OT, Patient would benefit from continued therapy after discharge  Equipment Recommendations: Equipment Needed: Yes  Other: RW, LH AE, ETS, shower chair  Plan: Times per week: 6x  Current Treatment Recommendations: Balance Training, Self-Care / ADL, Functional Mobility Training, Patient/Caregiver Education & Training, Endurance Training, Safety Education & Training, Equipment Evaluation, Education, & procurement    Patient Education  Patient Education: IADL Safety    Goals  Short term goals  Time Frame for Short term goals: 1 week  Short term goal 1: Pt will verbalize and demo all hip precautions during OT session to increase safety during ADLs. Short term goal 2: Pt will safely navigate to/from bathroom with SBA, RW, & min vcs for walker safety to increase indep with accessing environment during ADLs. Short term goal 3: Pt will tolerate dynamic standing task x 5 min with SBA for increased ease of sinkside grooming. Short term goal 4: Complete LE dressing with Demetrio, LHAE, & min vcs for THR precautions to increase indep with self care. Long term goals  Time Frame for Long term goals : 2 weeks  Long term goal 1: Pt will complete ADL routine including shower t/f with Justo to increase indep with self care.   Long term goal 2: Pt will complete light IADL task with Justo to restore PLOF with cooking and

## 2019-08-10 PROCEDURE — 92523 SPEECH SOUND LANG COMPREHEN: CPT

## 2019-08-10 PROCEDURE — 1290000000 HC SEMI PRIVATE OTHER R&B

## 2019-08-10 PROCEDURE — 6370000000 HC RX 637 (ALT 250 FOR IP): Performed by: INTERNAL MEDICINE

## 2019-08-10 PROCEDURE — 6370000000 HC RX 637 (ALT 250 FOR IP): Performed by: FAMILY MEDICINE

## 2019-08-10 RX ORDER — ONDANSETRON 4 MG/1
4 TABLET, FILM COATED ORAL EVERY 8 HOURS PRN
Status: DISCONTINUED | OUTPATIENT
Start: 2019-08-10 | End: 2019-08-19 | Stop reason: HOSPADM

## 2019-08-10 RX ADMIN — BUMETANIDE 1 MG: 1 TABLET ORAL at 10:15

## 2019-08-10 RX ADMIN — MAGNESIUM GLUCONATE 500 MG ORAL TABLET 400 MG: 500 TABLET ORAL at 21:12

## 2019-08-10 RX ADMIN — POTASSIUM CHLORIDE 20 MEQ: 20 TABLET, EXTENDED RELEASE ORAL at 10:16

## 2019-08-10 RX ADMIN — POLYETHYLENE GLYCOL 3350 17 G: 17 POWDER, FOR SOLUTION ORAL at 10:16

## 2019-08-10 RX ADMIN — MIDODRINE HYDROCHLORIDE 10 MG: 10 TABLET ORAL at 17:42

## 2019-08-10 RX ADMIN — MAGNESIUM GLUCONATE 500 MG ORAL TABLET 400 MG: 500 TABLET ORAL at 10:16

## 2019-08-10 RX ADMIN — FAMOTIDINE 20 MG: 20 TABLET ORAL at 10:16

## 2019-08-10 RX ADMIN — MIDODRINE HYDROCHLORIDE 10 MG: 10 TABLET ORAL at 10:16

## 2019-08-10 RX ADMIN — METOPROLOL SUCCINATE 12.5 MG: 25 TABLET, FILM COATED, EXTENDED RELEASE ORAL at 10:16

## 2019-08-10 RX ADMIN — BUMETANIDE 1 MG: 1 TABLET ORAL at 17:42

## 2019-08-10 RX ADMIN — MIDODRINE HYDROCHLORIDE 10 MG: 10 TABLET ORAL at 13:37

## 2019-08-10 RX ADMIN — Medication 300 MCG: at 10:19

## 2019-08-10 RX ADMIN — DIGOXIN 125 MCG: 125 TABLET ORAL at 10:16

## 2019-08-10 RX ADMIN — Medication 1 CAPSULE: at 21:14

## 2019-08-10 ASSESSMENT — PAIN SCALES - GENERAL
PAINLEVEL_OUTOF10: 0

## 2019-08-10 NOTE — PROGRESS NOTES
frequently using the microwave to cook frozen meals; family provides additional meals to supplement. Patient reported attending physician appointments and running errands independently. Patient is [employer] with amplification and has visual impairment with glasses available. Type of Home: House  Home Layout: One level, Laundry in basement  Home Access: Stairs to enter with rails  Entrance Stairs - Number of Steps: 2  Entrance Stairs - Rails: Left(Dtr in law stated cristiana rails to be added prior to discharge)  Home Equipment: (None used PTA)    ORAL MOTOR:  Oral-Motor Assessment WNL for functional communication. SPEECH / VOICE:  Speech: Informal evaluation of patient speech completed with no presence of dysarthria; 90% intelligible in conversation  Voice: Informal evaluation of patient voice completed with hoarse/wet vocal quality present throughout with frequent throat clearing. LANGUAGE:  Receptive:  1 Step Commands: 2/2  2 Step Commands: 2/2  Simple Yes/No Questions: 2/2  Complex Yes/No Questions: 2/2  Following Verbal Instructions: 4/4    Expressive:  Automatic Speech: 2/2  Sentence Completion (automatic): 2/2  Sentence Completion (open-ended): 2/2  Divergent Naming (concrete): 8/60 seconds (N=11/60 seconds)  Sentence Formulation: Intact for basic communication  Conversational Speech: 90% intelligible  Paraphasias: None present  Repetition: 1/2    COGNITION:  Christoval Cognitive Assessment (MOCA) version 7.1 completed. Pt scored 18/30. Normal is greater than or equal to 26/30.   Orientation: 6/6  Immediate Recall: 4/5  Short-Term Recall: 1/5  Divergent Namin/60 seconds (N=11/60 seconds)  Problem Solving: Mildly impaired  Reasoning: Mildly impaired  Sequencing: Mildly impaired  Thought Organization: Impaired  Insight: Poor-fair  Attention: 0/2  Math Computation: 2/3  Executive Functioning: Impaired    SWALLOWING:  Current Diet: Regular diet with thin liquids  WNL         RECOMMENDATIONS/ASSESSMENT:  DIAGNOSTIC

## 2019-08-10 NOTE — PROGRESS NOTES
14   Ht 5' 5\" (1.651 m)   Wt 144 lb 8 oz (65.5 kg)   SpO2 96%   BMI 24.05 kg/m²     BP (!) 104/58   Pulse 72   Temp 97.4 °F (36.3 °C) (Oral)   Resp 14   Ht 5' 5\" (1.651 m)   Wt 144 lb 8 oz (65.5 kg)   SpO2 96%   BMI 24.05 kg/m²   General appearance: alert, appears stated age and cooperative  Head: Normocephalic, without obvious abnormality, atraumatic  Lungs: clear to auscultation bilaterally  Heart: irregularly irregular rhythm and 2/6 LUCIANA  Abdomen: soft, non-tender; bowel sounds normal; no masses,  no organomegaly  Extremities: extremities normal, atraumatic, no cyanosis or edema  Skin: Skin color, texture, turgor normal. No rashes or lesions  Neurologic: Grossly normal      Electronically signed by Cindy Fofana MD on 8/10/2019 at 4:24 PM

## 2019-08-11 LAB
BASOPHILS # BLD: 0.7 %
BASOPHILS ABSOLUTE: 0.1 THOU/MM3 (ref 0–0.1)
BILIRUBIN URINE: NEGATIVE
BLOOD, URINE: NEGATIVE
CHARACTER, URINE: CLEAR
COLOR: YELLOW
EOSINOPHIL # BLD: 3 %
EOSINOPHILS ABSOLUTE: 0.3 THOU/MM3 (ref 0–0.4)
ERYTHROCYTE [DISTWIDTH] IN BLOOD BY AUTOMATED COUNT: 18.7 % (ref 11.5–14.5)
ERYTHROCYTE [DISTWIDTH] IN BLOOD BY AUTOMATED COUNT: 61.5 FL (ref 35–45)
GLUCOSE URINE: NEGATIVE MG/DL
HCT VFR BLD CALC: 34.3 % (ref 37–47)
HEMOGLOBIN: 10.9 GM/DL (ref 12–16)
IMMATURE GRANS (ABS): 0.04 THOU/MM3 (ref 0–0.07)
IMMATURE GRANULOCYTES: 0 %
KETONES, URINE: NEGATIVE
LEUKOCYTE ESTERASE, URINE: NEGATIVE
LYMPHOCYTES # BLD: 17.8 %
LYMPHOCYTES ABSOLUTE: 1.7 THOU/MM3 (ref 1–4.8)
MCH RBC QN AUTO: 29.5 PG (ref 26–33)
MCHC RBC AUTO-ENTMCNC: 31.8 GM/DL (ref 32.2–35.5)
MCV RBC AUTO: 92.7 FL (ref 81–99)
MONOCYTES # BLD: 7.1 %
MONOCYTES ABSOLUTE: 0.7 THOU/MM3 (ref 0.4–1.3)
NITRITE, URINE: NEGATIVE
NUCLEATED RED BLOOD CELLS: 0 /100 WBC
PH UA: 7 (ref 5–9)
PLATELET # BLD: 179 THOU/MM3 (ref 130–400)
PMV BLD AUTO: 10.5 FL (ref 9.4–12.4)
PROTEIN UA: NEGATIVE
RBC # BLD: 3.7 MILL/MM3 (ref 4.2–5.4)
SEG NEUTROPHILS: 71 %
SEGMENTED NEUTROPHILS ABSOLUTE COUNT: 7 THOU/MM3 (ref 1.8–7.7)
SPECIFIC GRAVITY, URINE: 1.01 (ref 1–1.03)
UROBILINOGEN, URINE: 0.2 EU/DL (ref 0–1)
WBC # BLD: 9.8 THOU/MM3 (ref 4.8–10.8)

## 2019-08-11 PROCEDURE — 6370000000 HC RX 637 (ALT 250 FOR IP): Performed by: INTERNAL MEDICINE

## 2019-08-11 PROCEDURE — 97110 THERAPEUTIC EXERCISES: CPT

## 2019-08-11 PROCEDURE — 97535 SELF CARE MNGMENT TRAINING: CPT

## 2019-08-11 PROCEDURE — 81003 URINALYSIS AUTO W/O SCOPE: CPT

## 2019-08-11 PROCEDURE — 97116 GAIT TRAINING THERAPY: CPT

## 2019-08-11 PROCEDURE — 97530 THERAPEUTIC ACTIVITIES: CPT

## 2019-08-11 PROCEDURE — 1290000000 HC SEMI PRIVATE OTHER R&B

## 2019-08-11 PROCEDURE — 36415 COLL VENOUS BLD VENIPUNCTURE: CPT

## 2019-08-11 PROCEDURE — 6370000000 HC RX 637 (ALT 250 FOR IP): Performed by: FAMILY MEDICINE

## 2019-08-11 PROCEDURE — 85025 COMPLETE CBC W/AUTO DIFF WBC: CPT

## 2019-08-11 RX ADMIN — MAGNESIUM GLUCONATE 500 MG ORAL TABLET 400 MG: 500 TABLET ORAL at 22:00

## 2019-08-11 RX ADMIN — POTASSIUM CHLORIDE 20 MEQ: 20 TABLET, EXTENDED RELEASE ORAL at 09:06

## 2019-08-11 RX ADMIN — Medication 300 MCG: at 09:07

## 2019-08-11 RX ADMIN — Medication 1 CAPSULE: at 22:07

## 2019-08-11 RX ADMIN — MIDODRINE HYDROCHLORIDE 10 MG: 10 TABLET ORAL at 17:02

## 2019-08-11 RX ADMIN — Medication 1 CAPSULE: at 09:13

## 2019-08-11 RX ADMIN — MAGNESIUM GLUCONATE 500 MG ORAL TABLET 400 MG: 500 TABLET ORAL at 09:06

## 2019-08-11 RX ADMIN — FAMOTIDINE 20 MG: 20 TABLET ORAL at 09:06

## 2019-08-11 RX ADMIN — DIGOXIN 125 MCG: 125 TABLET ORAL at 09:06

## 2019-08-11 RX ADMIN — BUMETANIDE 1 MG: 1 TABLET ORAL at 17:02

## 2019-08-11 RX ADMIN — POLYETHYLENE GLYCOL 3350 17 G: 17 POWDER, FOR SOLUTION ORAL at 09:07

## 2019-08-11 RX ADMIN — MIDODRINE HYDROCHLORIDE 10 MG: 10 TABLET ORAL at 09:06

## 2019-08-11 RX ADMIN — BUMETANIDE 1 MG: 1 TABLET ORAL at 09:06

## 2019-08-11 RX ADMIN — MIDODRINE HYDROCHLORIDE 10 MG: 10 TABLET ORAL at 12:07

## 2019-08-11 RX ADMIN — METOPROLOL SUCCINATE 12.5 MG: 25 TABLET, FILM COATED, EXTENDED RELEASE ORAL at 09:03

## 2019-08-11 ASSESSMENT — PAIN SCALES - GENERAL
PAINLEVEL_OUTOF10: 0

## 2019-08-11 NOTE — PROGRESS NOTES
Physical Therapy   [address] [address]  INPATIENT PHYSICAL THERAPY  DAILY NOTE  [facility] SKILLED NURSING UNIT - 8E-70/070-A  Time In: 1033  Time Out: 1103  Timed Code Treatment Minutes: 30 Minutes  Minutes: 30          Date: 2019  Patient Name: Elvira Davidson,  Gender:  female        MRN: [id number]  : 1924  (89 y.o.)     Referring Practitioner: JIMENA Simons MD.  Dr. Sarah Rahman, attending  Diagnosis: Lt femoral neck fx, s/p repair  Additional Pertinent Hx: Admitted [facility] ED due to Lt leg weakness and pain. .  Edema BLEs. x-ray noted Lt femoral neck fracture. Pt underwent HR 2019     Prior Level of Function:  Lives With: Alone  Type of Home: House  Home Layout: One level, Laundry in basement  Home Access: Stairs to enter with rails  Entrance Stairs - Number of Steps: 2  Entrance Stairs - Rails: Left(Dtr in law stated cristiana rails to be added prior to discharge)  Home Equipment: (None used PTA)   ADL Assistance: Independent  Homemaking Assistance: Independent  Homemaking Responsibilities: Yes  Ambulation Assistance: Independent  Transfer Assistance: Independent  Additional Comments: Pt reports she was indep PLOF including driving, cleaning, & grocery shopping. Pt was not using an AD PTA, was exercising    Restrictions/Precautions:  Restrictions/Precautions: Weight Bearing, Fall Risk, General Precautions, Surgical Protocols  Left Lower Extremity Weight Bearing: Weight Bearing As Tolerated  Position Activity Restriction  Hip Precautions: No hip flexion > 90 degrees, No hip external rotation, No hip internal rotation, No ADduction  Other position/activity restrictions: THR precautions    SUBJECTIVE: Patient in bedside chair upon arrival and agreeable to therapy. Patient requested use of bathroom at start of treatment session. Patient returned to bedside chair at conclusion of treatment session.      PAIN: 0/10    OBJECTIVE:  Transfers:  Sit to Stand: Stand By Assistance  Stand to Sit:Stand By

## 2019-08-11 NOTE — PROGRESS NOTES
[address] [address]  [address] [place] SKILLED NURSING UNIT  Occupational Therapy  Daily Note  Time:   Time In: 0815  Time Out: 0845  Timed Code Treatment Minutes: 30 Minutes  Minutes: 30          Date: 2019  Patient Name: Lucius Mena,   Gender: female      Room: [facility]70/070-A  MRN: [id number]  : 1924  (89 y.o.)  Referring Practitioner: Jarod Nettles MD; Attending: Dr. Randa Zuniga  Diagnosis: L Femoral Neck Fx s/p repair  Additional Pertinent Hx: Per ER note on 19: 80 y.o. female with a past medical history of HTN. The patient presents to the ED via EMS for evaluation of left leg weakness. The patient states that two days ago she felt a pain in her left thigh which was present for a couple hours and then resolved. The patient has not had any pain in her thigh since, however, began to have weakness in the left leg today. She reports having difficulty walking due to this weakness. s/p open tx femoral neck fracture with a prosthesis cemented on 19. Transfer to [facility] 19. Restrictions/Precautions:  Restrictions/Precautions: Weight Bearing, Fall Risk, General Precautions, Surgical Protocols  Left Lower Extremity Weight Bearing: Weight Bearing As Tolerated  Position Activity Restriction  Hip Precautions: No hip flexion > 90 degrees, No hip external rotation, No hip internal rotation, No ADduction  Other position/activity restrictions: THR precautions    SUBJECTIVE: In bed upon arrival, agreeable to OT session, confusion noted    PAIN: 0/10:     COGNITION: Decreased Memory, Decreased Judgment, Decreased Problem Solving and Decreased Insight    ADL:   Feeding: with set-up. A for opening containers  Grooming: Stand By Assistance. x 3 minutes with 1 UE support  Toileting: Contact Guard Assistance. for clothing management  Toilet Transfer: [employer]. RTS.       BED MOBILITY:  Supine to Sit: Stand By Assistance HOB elevated, used bedrail and increased time    TRANSFERS:  Sit to Stand: Contact Guard Assistance. EOB   Stand to Sit: Contact Guard Assistance. bedside chair    FUNCTIONAL MOBILITY:  Assistive Device: Rolling Walker  Assist Level:  Contact Guard Assistance. Distance: To and from bathroom     ASSESSMENT:  Activity Tolerance:  Patient tolerance of  treatment: good. Discharge Recommendations: Home with Home health OT, Patient would benefit from continued therapy after discharge  Equipment Recommendations: Equipment Needed: Yes  Other: RW, LH AE, ETS, shower chair  Plan: Times per week: 6x  Current Treatment Recommendations: Balance Training, Self-Care / ADL, Functional Mobility Training, Patient/Caregiver Education & Training, Endurance Training, Safety Education & Training, Equipment Evaluation, Education, & procurement    Patient Education  Patient Education: Precautions and Orientation    Goals  Short term goals  Time Frame for Short term goals: 1 week  Short term goal 1: Pt will verbalize and demo all hip precautions during OT session to increase safety during ADLs. Short term goal 2: Pt will safely navigate to/from bathroom with SBA, RW, & min vcs for walker safety to increase indep with accessing environment during ADLs. Short term goal 3: Pt will tolerate dynamic standing task x 5 min with SBA for increased ease of sinkside grooming. Short term goal 4: Complete LE dressing with Demetrio, LHAE, & min vcs for THR precautions to increase indep with self care. Long term goals  Time Frame for Long term goals : 2 weeks  Long term goal 1: Pt will complete ADL routine including shower t/f with Justo to increase indep with self care. Long term goal 2: Pt will complete light IADL task with Justo to restore PLOF with cooking and cleaning. Following session, patient left in safe position with all fall risk precautions in place.

## 2019-08-12 LAB
ANION GAP SERPL CALCULATED.3IONS-SCNC: 13 MEQ/L (ref 8–16)
BUN BLDV-MCNC: 42 MG/DL (ref 7–22)
CALCIUM SERPL-MCNC: 8.7 MG/DL (ref 8.5–10.5)
CHLORIDE BLD-SCNC: 98 MEQ/L (ref 98–111)
CO2: 28 MEQ/L (ref 23–33)
CREAT SERPL-MCNC: 0.9 MG/DL (ref 0.4–1.2)
GFR SERPL CREATININE-BSD FRML MDRD: 58 ML/MIN/1.73M2
GLUCOSE BLD-MCNC: 103 MG/DL (ref 70–108)
POTASSIUM SERPL-SCNC: 4.1 MEQ/L (ref 3.5–5.2)
SODIUM BLD-SCNC: 139 MEQ/L (ref 135–145)

## 2019-08-12 PROCEDURE — 6370000000 HC RX 637 (ALT 250 FOR IP): Performed by: FAMILY MEDICINE

## 2019-08-12 PROCEDURE — 80048 BASIC METABOLIC PNL TOTAL CA: CPT

## 2019-08-12 PROCEDURE — 97116 GAIT TRAINING THERAPY: CPT

## 2019-08-12 PROCEDURE — 6370000000 HC RX 637 (ALT 250 FOR IP): Performed by: INTERNAL MEDICINE

## 2019-08-12 PROCEDURE — 97110 THERAPEUTIC EXERCISES: CPT

## 2019-08-12 PROCEDURE — 36415 COLL VENOUS BLD VENIPUNCTURE: CPT

## 2019-08-12 PROCEDURE — 99231 SBSQ HOSP IP/OBS SF/LOW 25: CPT | Performed by: INTERNAL MEDICINE

## 2019-08-12 PROCEDURE — 97530 THERAPEUTIC ACTIVITIES: CPT

## 2019-08-12 PROCEDURE — 97535 SELF CARE MNGMENT TRAINING: CPT

## 2019-08-12 PROCEDURE — 1290000000 HC SEMI PRIVATE OTHER R&B

## 2019-08-12 PROCEDURE — 97127 HC SP THER IVNTJ W/FOCUS COG FUNCJ: CPT

## 2019-08-12 RX ADMIN — MIDODRINE HYDROCHLORIDE 10 MG: 10 TABLET ORAL at 09:15

## 2019-08-12 RX ADMIN — MAGNESIUM GLUCONATE 500 MG ORAL TABLET 400 MG: 500 TABLET ORAL at 09:15

## 2019-08-12 RX ADMIN — MIDODRINE HYDROCHLORIDE 10 MG: 10 TABLET ORAL at 12:33

## 2019-08-12 RX ADMIN — Medication 1 CAPSULE: at 21:02

## 2019-08-12 RX ADMIN — MIDODRINE HYDROCHLORIDE 10 MG: 10 TABLET ORAL at 17:32

## 2019-08-12 RX ADMIN — METOPROLOL SUCCINATE 12.5 MG: 25 TABLET, FILM COATED, EXTENDED RELEASE ORAL at 09:15

## 2019-08-12 RX ADMIN — BUMETANIDE 1 MG: 1 TABLET ORAL at 09:15

## 2019-08-12 RX ADMIN — FAMOTIDINE 20 MG: 20 TABLET ORAL at 09:16

## 2019-08-12 RX ADMIN — BUMETANIDE 1 MG: 1 TABLET ORAL at 17:32

## 2019-08-12 RX ADMIN — POLYETHYLENE GLYCOL 3350 17 G: 17 POWDER, FOR SOLUTION ORAL at 09:16

## 2019-08-12 RX ADMIN — MAGNESIUM GLUCONATE 500 MG ORAL TABLET 400 MG: 500 TABLET ORAL at 20:57

## 2019-08-12 RX ADMIN — Medication 300 MCG: at 09:19

## 2019-08-12 RX ADMIN — DIGOXIN 125 MCG: 125 TABLET ORAL at 09:15

## 2019-08-12 ASSESSMENT — PAIN SCALES - GENERAL
PAINLEVEL_OUTOF10: 0

## 2019-08-12 NOTE — PROGRESS NOTES
[address] [place] THERAPY  [facility] SKILLED NURSING UNIT  PROGRESS NOTE    TIME   SLP Individual Minutes  Time In: 930  Time Out: 1000  Minutes: 30  Timed Code Treatment Minutes: 30 Minutes       Date: 2019  Patient Name: Kan Rahman      CSN: [id number]   : 1924  (89 y.o.)  Gender: female   Referring Physician:  Tyesha Rodriguez MD  Diagnosis: Left femoral neck fracture s/p repair  Secondary Diagnosis: Cognitive impairment  Precautions: Fall risk  Current Diet: Regular, thin liquids   Swallowing Strategies: Standard Universal Swallow Precautions  Date of Last MBS: Not Applicable    Pain:  No pain reported. Subjective:  Pt seen completing crossword puzzle upon ST arrival; ST emphasized continued completion of these puzzles to promote active brain engagement. Pt alert and pleasant for duration of session. No family present. Short-Term Goals:  SHORT TERM GOAL #1:  Goal 1: Patient will complete mildly complex visual/verbal reasoning tasks with 80% accuracy and mod cues in order to improve completion of ADLs and IADLs upon discharge. - GOAL NOT MET, CONTINUE   INTERVENTIONS: Visual reasoning task implemented via use of calendar.   Set up- 80% indep, 20% required min cues  Navigation- 3/4 indep, 1/4 min cues  Writing in appts/events consisting of 4 elements- 3/5 indep, 1/5 min cues, 1/5 max cues  *decreased working memory  *good written use of abbreviations     SHORT TERM GOAL #2:  Goal 2: Patient will complete immediate, delayed, and working memory tasks with implementation of compensatory strategies with 80% accuracy and mod cues in order to improve retention of novel information. - GOAL NOT MET, CONTINUE   INTERVENTIONS: Generation of errand list-  Immediate recall: 4/5 indep, 1/5 min cues  Delayed (10 min) recall: 4/5 indep, 1/5 min cues  *adeqate functional recall noted   *self rehearsal strategies implemented, discussed, and demonstrated; pt verbalized understanding by deficits related to visual/verbal reasoning, immediate and delayed recall, working memory, attention, sentence repetition, and executive functioning. Expressive and receptive language remains grossly intact for daily communicative interactions. No presence of dysphagia at this time; currently tolerating regular diet with thin liquids. Patient would HIGHLY benefit from continued ST services in order to address deficits and determine likelihood for making potential return to independent living at discharge.      Aneudy Acosta M.S. [place] [id number]

## 2019-08-12 NOTE — PATIENT CARE CONFERENCE
[facility]  Transitional Care Unit  Team Conference Note    Patient Name: Chantel Ventura   MRN: [id number]    : 1924  (89 y.o.)  Gender: female   Referring Practitioner: JIMENA Young MD.  Dr. Giovanny Naranjo, attending  Diagnosis: Lt femoral neck fx, s/p repair    Team Conference Date: 2019   Admission Date:   1573  6:47 PM  Re-Certification Date: 1537    :  Tentative Discharge Plan and current psychosocial issues:Patient is a 80year old  female who was admitted to [facility] for a left femoral Neck Fracture. Patient was  52 years and has been a  for 30 years. She has two sons, one lives in [place] and the other in [facility]. Patient has a close relationship with her son and daughter in law. Patients dtr in law helps with the house cleaning and other needs around the home and her son completes all the yard work and winter chores. Patient is very mobile and independent, she still drives herself to all MD appointments and does her own grocery shopping. Patient plans to return home with the support of her son and daughter in law after she is discharged from [facility]. She was managing her own care, driving to the grocery store and all doctor appointments.  anticipates patient may benefit from out patient therapy and adaptive medical equipment. Patient plans to continue with managing her own care and remaining as independent as possible. Nursing:     Weight: down 5%     Wounds/Incisions/Ulcers:  Incision healing well  Bowel: colostomy  Bladder:continent     Pain: Patient's pain is currently controlled with tylenol    Education Provided:  Diabetic:  No  Peg Tube:No    [place] Care:  Education:NA  Removal Necessary:  NA  Supplies Needed: NA     Anticoagulant Use:  VEENA    Antibiotic Use:  Patient not on antibiotics    Psychotropic Medication Use:  Patient not on psychotropic medications.     Oxygen Use: No    Home Medications Reconciled: N/A    Physical Therapy:    Pt is showing good progress, meeting 4 short term goals. Limitations noted yet with strength of LLE and resulting decreased functional mobility, gait and steps  Pt will benefit from continued skilled PT to further advance in these areas with good potential to attain Mod I functional status in the discharge setting with use of RW  PT Equipment Recommendations  Equipment Needed: Yes(will need RW)    Occupational  Therapy: Assessment: Patient is making good steady progression towards therapy goals. Patient is limited in therapy sessions at times d/t decreased safety and insight, decreased activity tolerance, decreased balance with 1-2 UE release from walker, decreased cognition, and increase of assistance with ADLs/IADLs. Patient would benefit from continued OT services to increase strength, increase activity tolerance, increase safety awareness and awareness of precautions while in kitchen and during ADL routine, increase independence with ADL routine with use of LHAE        Speech Therapy:   Patient has met NO STG's or LTG's this therapy period d/t this being first ST treatment session, however, progress has already begun with noted improvements in delayed recall and processing speed. Patient does continue to present with a mild cognitive impairment evidenced by deficits related to visual/verbal reasoning, immediate and delayed recall, working memory, attention, sentence repetition, and executive functioning. Expressive and receptive language remains grossly intact for daily communicative interactions. No presence of dysphagia at this time; currently tolerating regular diet with thin liquids. Patient would HIGHLY benefit from continued ST services in order to address deficits and determine likelihood for making potential return to independent living at discharge. Nutrition:    Weight: 137 lb (62.1 kg) / Body mass index is 22.8 kg/m². Please see nutrition note for details.     Family Education: No family available

## 2019-08-12 NOTE — PLAN OF CARE
decrease  Description  Risk for impaired skin integrity will decrease  Outcome: Ongoing     Problem: Tissue Perfusion:  Goal: Peripheral tissue perfusion will improve  Description  Peripheral tissue perfusion will improve  Outcome: Ongoing  Note:   Pulses palapable, limbs warm and color appropriate for ethnicity. Edema on feet. Heels and feet elevated in bed. Problem: Tissue Perfusion:  Goal: Risk of venous thrombosis will decrease  Description  Risk of venous thrombosis will decrease  Outcome: Ongoing     Problem: Infection - Surgical Site:  Goal: Will show no infection signs and symptoms  Description  Will show no infection signs and symptoms  Outcome: Ongoing  Note:   No s/s of infection. Wound clean. Problem: IP MOBILITY  Goal: LTG - patient will demonstrate safe mobility requirements  Outcome: Ongoing     Problem: Nutrition  Goal: Optimal nutrition therapy  Outcome: Ongoing     Problem: Bowel/Gastric:  Goal: Complications related to the disease process, condition or treatment will be avoided or minimized  Outcome: Ongoing     Problem: Bowel/Gastric:  Goal: Will be independent with ostomy care  Outcome: Ongoing  Note:   Patient is not independent with ostomy care at this time. Educated patient on steps in burping bag and emptying bag. Problem: Risk for Impaired Skin Integrity  Goal: Tissue integrity - skin and mucous membranes  Description  Structural intactness and normal physiological function of skin and  mucous membranes. Outcome: Ongoing  Note:   Surgical wound healing. Ostomy pink and red, no excoriation. Bottom is red, but blanchable.        Problem: IP COMMUNICATION/DYSARTHRIA  Goal: LTG - Patient will effectively communicate in all situations with the use of compensatory strategies  Outcome: Ongoing

## 2019-08-12 NOTE — PROGRESS NOTES
[address] [place] [address]  INPATIENT PHYSICAL THERAPY  DAILY NOTE  [address] 8E-70/070-A    Time In: 4  Time Out: 1415  Timed Code Treatment Minutes: 30 Minutes  Minutes: 30          Date: 2019  Patient Name: Maricarmen Pillai,  Gender:  female        MRN: [id number]  : 1924  (89 y.o.)     Referring Practitioner: JIMENA Suresh MD.  Dr. Roddy Garg, attending  Diagnosis: Lt femoral neck fx, s/p repair  Additional Pertinent Hx: Admitted [facility] ED due to Lt leg weakness and pain. .  Edema BLEs. x-ray noted Lt femoral neck fracture. Pt underwent HR 2019     Prior Level of Function:  Lives With: Alone  Type of Home: House  Home Layout: One level, Laundry in basement  Home Access: Stairs to enter with rails  Entrance Stairs - Number of Steps: 2  Entrance Stairs - Rails: Left(Dtr in law stated cristiana rails to be added prior to discharge)  Home Equipment: (None used PTA)   Bathroom Shower/Tub: Tub/Shower unit(typically bathes in bath-tub)  Bathroom Toilet: Standard    Receives Help From: Family  ADL Assistance: Independent  Homemaking Assistance: Independent  Homemaking Responsibilities: Yes  Ambulation Assistance: Independent  Transfer Assistance: Independent  Active : Yes  IADL Comments: Son and daughter-in-law live nearby and able to assist as needed. Daughter-in-law completes the cleaning at times.   Additional Comments: Patient is very active for her age    Restrictions/Precautions:  Restrictions/Precautions: Weight Bearing, Fall Risk, General Precautions, Surgical Protocols  Left Lower Extremity Weight Bearing: Weight Bearing As Tolerated  Position Activity Restriction  Hip Precautions: No hip flexion > 90 degrees, No hip external rotation, No hip internal rotation, No ADduction  Other position/activity restrictions: THR precautions    SUBJECTIVE: pleasant, co-operative, report being very tired requesting to go to bed end of session    PAIN: 0/10:     OBJECTIVE:  Bed

## 2019-08-12 NOTE — PROGRESS NOTES
Renal Progress Note  [facility]    Patient :  Susu Wild; 80 y.o. MRN# [id number]  Location:  8E70/070-A  Attending:  Karla Nunes MD  Admit Date:  2019   Hospital Day: 6      Subjective:     Nephrology is following the patient for diuretic management and CKD. Patient seen and examined on TCU. Doing well. Edema improving and weights improving. Denies SOB. No dizziness. Outpatient Medications:     Medications Prior to Admission: [] HYDROcodone-acetaminophen (NORCO) 5-325 MG per tablet, Take 1-2 tablets by mouth every 4-6 hours as needed for Pain for up to 7 days. enoxaparin (LOVENOX) 40 MG/0.4ML injection, Inject 0.4 mLs into the skin daily  Flaxseed, Linseed, (FLAX SEED OIL) 1000 MG CAPS, Take 1,000 mg by mouth daily  Omega-3 Fatty Acids (FISH OIL) 1000 MG CAPS, Take 1,000 mg by mouth daily  Cyanocobalamin (VITAMIN B 12 PO), Take 500 mcg by mouth daily  Biotin 300 MCG TABS, Take 1 tablet by mouth daily  vitamin D 1000 units CAPS, Take 1 capsule by mouth daily  vitamin C (ASCORBIC ACID) 500 MG tablet, Take 500 mg by mouth daily  vitamin E 400 UNIT capsule, Take 400 Units by mouth daily  Multiple Vitamins-Minerals (PRESERVISION AREDS PO), Take 1 tablet by mouth 2 times daily   aspirin 81 MG tablet, Take 81 mg by mouth daily.   Multiple Vitamins-Minerals (MULTIVITAMIN PO), Take 0.5 tablets by mouth daily   lisinopril (PRINIVIL;ZESTRIL) 20 MG tablet, Take 10 mg by mouth daily     Current Medications:     Scheduled Meds:    potassium chloride  20 mEq Oral Daily    metoprolol succinate  12.5 mg Oral Daily    famotidine  20 mg Oral Daily    bumetanide  1 mg Oral BID    digoxin  125 mcg Oral Daily    lidocaine  1 patch Transdermal Daily    magnesium oxide  400 mg Oral BID    midodrine  10 mg Oral TID WC    polyethylene glycol  17 g Oral Daily    ppd   Does not apply Weekly    tuberculin  5 Units Intradermal Weekly    pneumococcal 13-valent conjugate  0.5 mL Intramuscular Once    Biotin  300 mcg Oral Daily    PRESERVISION AREDS  1 capsule Oral BID     Continuous Infusions:     PRN Meds:  ondansetron, senna, acetaminophen, magnesium hydroxide, potassium chloride **OR** potassium alternative oral replacement **OR** potassium chloride    Input/Output:       I/O last 3 completed shifts: In: 1360 [P.O.:1360]  Out: 2700 [Urine:2700]. Patient Vitals for the past 96 hrs (Last 3 readings):   Weight   19 0245 137 lb (62.1 kg)   19 0503 140 lb (63.5 kg)   08/10/19 0616 144 lb 8 oz (65.5 kg)       Vital Signs:   Temperature:  Temp: 95.7 °F (35.4 °C)  TMax:   Temp (24hrs), Av.6 °F (35.9 °C), Min:95.7 °F (35.4 °C), Max:97.4 °F (36.3 °C)    Respirations:  Resp: 12  Pulse:   Pulse: 83  BP:    BP: (!) 96/51  BP Range: Systolic (90IOO), XNX:386 , Min:96 , NEN:003       Diastolic (14IMC), NMZ:03, Min:51, Max:57      Physical Examination:     General:  Awake, alert, no acute distress  HEENT: NC/AT/ MMM   Chest:               Clear B/L  Cardiac:  S1 S2 , irregular  Abdomen: Soft, non-tender, +ostomy  Neuro:  No facial droop, No Asterixis  SKIN:  No rashes, good skin turgor. Extremities:  1-2+ B/L LE edema     Labs:       Recent Labs     19  0426   WBC 9.8   RBC 3.70*   HGB 10.9*   HCT 34.3*   MCV 92.7   MCH 29.5   MCHC 31.8*      MPV 10.5      BMP:   Recent Labs     19  0450      K 4.1   CL 98   CO2 28   BUN 42*   CREATININE 0.9   GLUCOSE 103   CALCIUM 8.7      Phosphorus:   No results for input(s): PHOS in the last 72 hours. Magnesium:    No results for input(s): MG in the last 72 hours. Albumin:    No results for input(s): LABALBU in the last 72 hours.   BNP:    No results found for: BNP  BELEM:    No results found for: BELEM  SPEP:  Lab Results   Component Value Date    PROT 5.5 2019     UPEP:   No results found for: LABPE  C3:   No results found for: C3  C4:   No results found for: C4  MPO ANCA:   No results found for: MPO  PR3 ANCA:

## 2019-08-12 NOTE — PROGRESS NOTES
Sit:Stand By Assistance    Ambulation:  Stand By Assistance  Distance: ~200ft x1, 30ft x1, 230ft x1  Surface: Level Tile  Device:Rolling Walker  Gait Deviations: Forward Flexed Posture, Decreased Step Length Bilaterally, Decreased Gait Speed, Decreased Heel Strike Bilaterally, Mild Path Deviations and veers to the Right, steady, no LOB    Stairs:  Stand By Assistance  Number of Steps: 4   Height: 6\" step with Bilateral Handrails   Stairs:  Contact Guard Assistance  Number of Steps: 1 (step ups forward/lateral x5 reps each, leading with L)  Height: 6\" step with BUE support     Balance:  Dynamic Standing Balance: Stand By Assistance, BUE support in parallel bars, patient standing on green foam, reaching outside SOBIA in all directions      Exercise:  Patient was guided in 1 set(s) 15 reps of exercise to both lower extremities. Long arc quads, Seated hamstring curls, Seated heel/toe raises, Seated isometric hip adduction, Standing heel/toe raises, Standing marches, Standing hip abduction/adduction, Standing hamstring curls and 2# ankle weights applied for standing exercises. Exercises were completed for increased independence with functional mobility. Functional Outcome Measures: Not completed       ASSESSMENT:  Assessment: Patient progressing toward established goals. Activity Tolerance:  Patient tolerance of  treatment: good.       Equipment Recommendations:Equipment Needed: Yes(will need RW)  Discharge Recommendations:  Continue to assess pending progress, Patient would benefit from continued therapy after discharge    Plan: Times per week: 6x/ wk  Current Treatment Recommendations: Strengthening, Transfer Training, Endurance Training, ROM, Balance Training, Gait Training, Functional Mobility Training, Stair training, Safety Education & Training, Patient/Caregiver Education & Training, Equipment Evaluation, Education, & procurement, Home Exercise Program    Patient Education  Patient Education: Transfers, Gait, Stairs, Verbal Exercise Instruction    Goals:  Patient goals : get back home soon  Short term goals  Time Frame for Short term goals: 10 days  Short term goal 1: Pt to be CGA, maintaining hip precautions, for supine <> sit to get in/out of bed  Short term goal 2: Pt to be SBA for sit <> stand to get up to ambulate  Short term goal 3: Pt to ambulate > 50 ft with RW with SBA for household distances  Short term goal 4: Pt to negotiate 1 step with RW, SBA, for progression to home entry  Short term goal 5: Pt to complete car transfer with CGA for transportation needs  Short term goal 6: Pt to ascend/descend 4 steps with cristiana rails, CGA, to progress to home entry  Short term goal 6: Pt to ascend/descend 4 steps with cristiana rails, CGA, to progress to home entry  Long term goals  Time Frame for Long term goals : 3 wks  Long term goal 1: Pt to be Mod I, maintaining hip precautions, for supine <> sit to get in/out of bed  Long term goal 2: Pt to be Mod I, for sit <> stand to get up to ambulate  Long term goal 3: Pt to ambulate >=100 ft with RW with SBA for community mobility, >= 50 ft, Mod I for home mobility  Long term goal 4: Pt to ascend/descend 4+ steps with cristiana rails, Mod I;  curb step with RW, Mod I, for home entry  Long term goal 5: Pt to complete car transfer with SB A for transportation needs    Following session, patient left in safe position with all fall risk precautions in place.

## 2019-08-13 PROCEDURE — 97530 THERAPEUTIC ACTIVITIES: CPT

## 2019-08-13 PROCEDURE — 6370000000 HC RX 637 (ALT 250 FOR IP): Performed by: INTERNAL MEDICINE

## 2019-08-13 PROCEDURE — 97110 THERAPEUTIC EXERCISES: CPT

## 2019-08-13 PROCEDURE — 6370000000 HC RX 637 (ALT 250 FOR IP): Performed by: FAMILY MEDICINE

## 2019-08-13 PROCEDURE — 97535 SELF CARE MNGMENT TRAINING: CPT

## 2019-08-13 PROCEDURE — 97116 GAIT TRAINING THERAPY: CPT

## 2019-08-13 PROCEDURE — 0220000000 HC SKILLED NURSING FACILITY

## 2019-08-13 PROCEDURE — 97127 HC SP THER IVNTJ W/FOCUS COG FUNCJ: CPT

## 2019-08-13 PROCEDURE — 1290000000 HC SEMI PRIVATE OTHER R&B

## 2019-08-13 RX ADMIN — MIDODRINE HYDROCHLORIDE 10 MG: 10 TABLET ORAL at 16:06

## 2019-08-13 RX ADMIN — MAGNESIUM GLUCONATE 500 MG ORAL TABLET 400 MG: 500 TABLET ORAL at 21:19

## 2019-08-13 RX ADMIN — POTASSIUM CHLORIDE 20 MEQ: 20 TABLET, EXTENDED RELEASE ORAL at 09:45

## 2019-08-13 RX ADMIN — DIGOXIN 125 MCG: 125 TABLET ORAL at 09:46

## 2019-08-13 RX ADMIN — Medication 1 CAPSULE: at 21:19

## 2019-08-13 RX ADMIN — BUMETANIDE 1 MG: 1 TABLET ORAL at 09:45

## 2019-08-13 RX ADMIN — FAMOTIDINE 20 MG: 20 TABLET ORAL at 09:46

## 2019-08-13 RX ADMIN — BUMETANIDE 1 MG: 1 TABLET ORAL at 16:06

## 2019-08-13 RX ADMIN — MIDODRINE HYDROCHLORIDE 10 MG: 10 TABLET ORAL at 12:35

## 2019-08-13 RX ADMIN — Medication 300 MCG: at 09:46

## 2019-08-13 RX ADMIN — Medication 1 CAPSULE: at 09:52

## 2019-08-13 RX ADMIN — MIDODRINE HYDROCHLORIDE 10 MG: 10 TABLET ORAL at 09:45

## 2019-08-13 RX ADMIN — ACETAMINOPHEN 650 MG: 325 TABLET ORAL at 07:18

## 2019-08-13 RX ADMIN — MAGNESIUM GLUCONATE 500 MG ORAL TABLET 400 MG: 500 TABLET ORAL at 09:45

## 2019-08-13 RX ADMIN — METOPROLOL SUCCINATE 12.5 MG: 25 TABLET, FILM COATED, EXTENDED RELEASE ORAL at 11:35

## 2019-08-13 ASSESSMENT — PAIN SCALES - GENERAL
PAINLEVEL_OUTOF10: 0

## 2019-08-13 NOTE — PROGRESS NOTES
Working memory:  ST presented patient with 10 digit phone number, cued to immediate record each number via pen/paper:  4/5 indep, 1/5 incorrect d/t addition of \"extra\" digit. Orientation:  4/4 components correct with indep use of calendar     Emmy Monsivais 1277 #3:  Goal 3: Patient will complete sustained, alternating, divided, and selective attention tasks with 80% accuracy and mod cues in order to determine appropriateness for potential return to driving. -   INTERVENTIONS: Patient with appropriate sustained attention throughout 15 minute treatment session. Will continue to target alternating, divided and selective attention tasks     SHORT TERM GOAL #4:  Goal 4: Patient will complete mildly complex functional problem solving tasks (e.g., medications, finances, etc.) with 80% accuracy and mod cues in order to improve completion of ADLs and IADLs upon discharge. -  INTERVENTIONS:  Patient appropriately utilized remote to turn off television. Long-Term Goals:  Timeframe for Long-term Goals: 6 weeks  LONG TERM GOAL #1:  Goal 1: Patient will improve cognitive skills to at least a Mod I level in order to improve completion of ADLs and IADLs with support from family upon discharge. - ONGOING      EDUCATION:  Learner: Patient  Education:  Reviewed results and recommendations of this evaluation and Reviewed ST goals and Plan of Care  Evaluation of Education: Verbalizes understanding, Demonstrates without assistance and Family not present    ASSESSMENT/PLAN:  Activity Tolerance:  Patient tolerance of  treatment: Good   Assessment/Plan: Patient progressing toward established goals. Continues to require skilled care of licensed speech pathologist to progress toward achievement of established goals and plan of care. .     Plan for Next Session: problem solving, attention      YVONNE Calderon 23

## 2019-08-13 NOTE — PROGRESS NOTES
Nutrition Assessment    Type and Reason for Visit: Reassess(po/ supplement monitor; severe malnutrition)    Nutrition Recommendations: Recommend multivitamin. Continue current diet and ONS as ordered. Nutrition Assessment: . Pt improving from a nutritional standpoint AEB good po intake and good acceptance of magic cup supplement. Remains at risk for further nutritional compromise r/t underlying severe malnutrition, advanced age, CHF, increased nutrient needs for healing s/p hip surgery. Recommendations as per above. Malnutrition Assessment:  · Malnutrition Status: Meets the criteria for severe malnutrition  · Context: Chronic illness  · Findings of the 6 clinical characteristics of malnutrition (Minimum of 2 out of 6 clinical characteristics is required to make the diagnosis of moderate or severe Protein Calorie Malnutrition based on AND/ASPEN Guidelines):  1. Fat Loss-Severe subcutaneous fat loss, Orbital, Fat overlying the ribs  2. Muscle Loss-Severe muscle mass loss, Temples (temporalis muscle), Clavicles (pectoralis and deltoids)    Nutrition Risk Level:  Moderate    Nutrient Needs:  · Estimated Daily Total Kcal: 2142-2581 kcal/day (25-30 kcal/kg -65.5 kg on 8/8)  · Estimated Daily Protein (g): 79+ g/day (1.2+ g/kg - 65.5 kg on 8/8)    Nutrition Diagnosis:   · Problem: Severe malnutrition, In context of chronic illness  · Etiology: related to Insufficient energy/nutrient consumption     Signs and symptoms:  as evidenced by Severe muscle loss, Severe loss of subcutaneous fat    Objective Information:  · Nutrition-Focused Physical Findings: thin, advanced age, denies nausea or pain; medication includes bumex and glycolax; colostomy  · Wound Type: Surgical Wound, Skin Tears(s/p total left hip arthroplasty; on 7/30; left thigh skin tear; colostomy)  · Current Nutrition Therapies:  · Oral Diet Orders: General, 2gm Sodium, Fluid Restriction(1800 mL)   · Oral Diet intake: %(per chart review and per pt report)  · Oral Nutrition Supplement (ONS) Orders: Frozen Oral Supplement(Chocolate Magic Cup TID)  · ONS intake: %(per chart review and per pt report)  · Anthropometric Measures:  · Ht: 5' 5\" (165.1 cm)   · Current Body Wt: 137 lb 3.2 oz (62.2 kg)(8/13; +2 pitting edema)  · Admission Body Wt: 147 lb 9.6 oz (67 kg)(8/7; +2 pitting edema BLE)  · Usual Body Wt: (pt unsure.  183 lb 13.8 oz on 8/1- +1 LUE, +4 RLE, +3 LLE edema noted- hx CHF)  · % Weight Change:  ,  6.8% loss in 5 days (suspect related to volume overload, nephrology following for diuretic management and note edema and weight improving)  · Ideal Body Wt: 125 lb (56.7 kg),  · BMI Classification: BMI 18.5 - 24.9 Normal Weight    Nutrition Interventions:   Continue current diet, Continue current ONS  Continued Inpatient Monitoring, Education Initiated, Coordination of Care(Encouraged po intake and protein sources at best effort)    Nutrition Evaluation:   · Evaluation: Progressing toward goals   · Goals: Pt will consume 75% or more of meals during LOS    · Monitoring: Nutrition Progression, Meal Intake, Supplement Intake, Wound Healing, Skin Integrity, Weight, Pertinent Labs, Monitor Bowel Function      Electronically signed by Frank Wallace RD, LD on 8/13/19 at 4:11 PM    Contact Number: [phone]

## 2019-08-13 NOTE — PROGRESS NOTES
surfaces    Transfers:  Sit to Stand: Supervision, with increased time for completion, to/from chair with arms  Stand to [address] [place] [address], to/from chair with arms    Ambulation:  Stand By Assistance, with verbal cues   Distance: 270' x 1,12' x 1  Surface: Level Tile  Device:Rolling Walker  Gait Deviations: Forward Flexed Posture and Narrow Base of Support    Exercise:  Patient was guided in 1 set(s) 15 reps of exercise to both lower extremities. Ankle pumps, Glut sets, Quad sets, Heelslides, Short arc quads, Hip abduction/adduction, Seated hip flexion, Seated hamstring curls, Seated heel/toe raises and Seated isometric hip adduction. No left LE hip abd/add activity. Exercises were completed for increased independence with functional mobility. Functional Outcome Measures: Not completed       ASSESSMENT:  Assessment: Patient progressing toward established goals. Activity Tolerance:  Patient tolerance of  treatment: good.       Equipment Recommendations:Equipment Needed: Yes(RW ordered from [facility])  Discharge Recommendations:  Continue to assess pending progress, Patient would benefit from continued therapy after discharge    Plan: Times per week: 6x/ wk  Current Treatment Recommendations: Strengthening, Transfer Training, Endurance Training, ROM, Balance Training, Gait Training, Functional Mobility Training, Stair training, Safety Education & Training, Patient/Caregiver Education & Training, Equipment Evaluation, Education, & procurement, Home Exercise Program    Patient Education  Patient Education: Home Exercise Program, Precautions/Restrictions, Transfers, Reviewed Prior Education, Gait, Verbal Exercise Instruction,  - Patient Verbalized Understanding, - Patient Requires Continued Education    Goals:  Patient goals : get back home soon  Short term goals  Time Frame for Short term goals: 10 days  Short term goal 1: Pt to be CGA, maintaining hip precautions, for supine <> sit to get in/out of bed  Short term goal 2: Pt to be SBA for sit <> stand to get up to ambulate  Short term goal 3: Pt to ambulate > 50 ft with RW with SBA for household distances  Short term goal 4: Pt to negotiate 1 step with RW, SBA, for progression to home entry  Short term goal 5: Pt to complete car transfer with CGA for transportation needs  Short term goal 6: Pt to ascend/descend 4 steps with cristiana rails, CGA, to progress to home entry  Short term goal 6: Pt to ascend/descend 4 steps with cristiana rails, CGA, to progress to home entry  Long term goals  Time Frame for Long term goals : 3 wks  Long term goal 1: Pt to be Mod I, maintaining hip precautions, for supine <> sit to get in/out of bed  Long term goal 2: Pt to be Mod I, for sit <> stand to get up to ambulate  Long term goal 3: Pt to ambulate >=100 ft with RW with SBA for community mobility, >= 50 ft, Mod I for home mobility  Long term goal 4: Pt to ascend/descend 4+ steps with cristiana rails, Mod I;  curb step with RW, Mod I, for home entry  Long term goal 5: Pt to complete car transfer with SB A for transportation needs    Following session, patient left in safe position with all fall risk precautions in place.

## 2019-08-13 NOTE — PLAN OF CARE
Nutrition Problem: Severe malnutrition, In context of chronic illness  Intervention: Food and/or Nutrient Delivery: Continue current diet, Continue current ONS  Nutritional Goals: Pt will consume 75% or more of meals during LOS   Problem: Nutrition  Goal: Optimal nutrition therapy  8/13/2019 1611 by Edie Medina RD, LD  Outcome: Ongoing

## 2019-08-13 NOTE — PROGRESS NOTES
TCU BALANCE TEST:    Balance during to/from sit to stand CGA   Balance during walking PT:    OT: Contact guard assistance (08/07/19 1156)   Balance during turn-around and while walking PT:    OT: Contact guard assistance (08/07/19 1156)   Balance moving on and off toilet  CGA   Balance during surface to/from surface transfers     Independent = 0  Modified Independent, Supervision, SBA = 1  CGA, Min Assist, Mod Assist, Max Assist, Dependent = 2  Not Tested/No Response = 8

## 2019-08-14 PROCEDURE — 97530 THERAPEUTIC ACTIVITIES: CPT

## 2019-08-14 PROCEDURE — 2500000003 HC RX 250 WO HCPCS: Performed by: INTERNAL MEDICINE

## 2019-08-14 PROCEDURE — 97110 THERAPEUTIC EXERCISES: CPT

## 2019-08-14 PROCEDURE — 1290000000 HC SEMI PRIVATE OTHER R&B

## 2019-08-14 PROCEDURE — 97127 HC SP THER IVNTJ W/FOCUS COG FUNCJ: CPT

## 2019-08-14 PROCEDURE — 6370000000 HC RX 637 (ALT 250 FOR IP): Performed by: INTERNAL MEDICINE

## 2019-08-14 PROCEDURE — 6370000000 HC RX 637 (ALT 250 FOR IP): Performed by: FAMILY MEDICINE

## 2019-08-14 PROCEDURE — 97535 SELF CARE MNGMENT TRAINING: CPT

## 2019-08-14 PROCEDURE — 97116 GAIT TRAINING THERAPY: CPT

## 2019-08-14 RX ADMIN — Medication 5 UNITS: at 16:37

## 2019-08-14 RX ADMIN — BUMETANIDE 1 MG: 1 TABLET ORAL at 16:32

## 2019-08-14 RX ADMIN — MAGNESIUM GLUCONATE 500 MG ORAL TABLET 400 MG: 500 TABLET ORAL at 09:22

## 2019-08-14 RX ADMIN — POTASSIUM CHLORIDE 20 MEQ: 20 TABLET, EXTENDED RELEASE ORAL at 09:22

## 2019-08-14 RX ADMIN — FAMOTIDINE 20 MG: 20 TABLET ORAL at 09:22

## 2019-08-14 RX ADMIN — METOPROLOL SUCCINATE 12.5 MG: 25 TABLET, FILM COATED, EXTENDED RELEASE ORAL at 09:24

## 2019-08-14 RX ADMIN — Medication 1 CAPSULE: at 09:23

## 2019-08-14 RX ADMIN — BUMETANIDE 1 MG: 1 TABLET ORAL at 09:22

## 2019-08-14 RX ADMIN — MIDODRINE HYDROCHLORIDE 10 MG: 10 TABLET ORAL at 09:22

## 2019-08-14 RX ADMIN — DIGOXIN 125 MCG: 125 TABLET ORAL at 09:22

## 2019-08-14 RX ADMIN — Medication 1 CAPSULE: at 21:24

## 2019-08-14 RX ADMIN — MIDODRINE HYDROCHLORIDE 10 MG: 10 TABLET ORAL at 16:32

## 2019-08-14 RX ADMIN — MAGNESIUM GLUCONATE 500 MG ORAL TABLET 400 MG: 500 TABLET ORAL at 21:24

## 2019-08-14 RX ADMIN — MIDODRINE HYDROCHLORIDE 10 MG: 10 TABLET ORAL at 11:38

## 2019-08-14 RX ADMIN — Medication 300 MCG: at 09:23

## 2019-08-14 ASSESSMENT — PAIN SCALES - GENERAL
PAINLEVEL_OUTOF10: 0
PAINLEVEL_OUTOF10: 0

## 2019-08-14 ASSESSMENT — PAIN - FUNCTIONAL ASSESSMENT: PAIN_FUNCTIONAL_ASSESSMENT: ACTIVITIES ARE NOT PREVENTED

## 2019-08-14 NOTE — PROGRESS NOTES
Octavio Callahan        MRN: [id number]    : 1924  (89 y.o.)  Gender: female   Principal Problem: <principal problem not specified>    Section J    Health Conditions    Should Pain Assessment Interview be Conducted? Attempt to conduct interview with all residents. If resident is comatose, skip to , Shortness of Breath (dyspnea)   Enter Code  1 0 - No à (resident is rarely/never understood) à Skip to [address] of Breath  1 - Yes à Continue to , Pain Presence   Pain Assessment Interview   Pain Presence   Enter Code  0 Ask resident: Libra Allie you had pain or hurting at any time in the last 5 days?   0 - No à Skip to , Shortness of Breath  1 - Yes  à Continue to , Pain Frequency  9 - Unable to answer à Skip to , Shortness of Breath

## 2019-08-14 NOTE — PROGRESS NOTES
Home Exercise Program    Patient Education  Patient Education: Home Exercise Program, Precautions/Restrictions, Equipment Education, Transfers, Reviewed Prior Education, Gait, Stairs, Verbal Exercise Instruction,  - Patient Verbalized Understanding, - Patient Requires Continued Education    Goals:  Patient goals : get back home soon  Short term goals  Time Frame for Short term goals: 10 days  Short term goal 1: Pt to be CGA, maintaining hip precautions, for supine <> sit to get in/out of bed  MET see LTG  Short term goal 2: Pt to be SBA for sit <> stand to get up to ambulate MET see LTG  Short term goal 3: Pt to ambulate > 50 ft with RW with SBA for household distances MET see LTG  Short term goal 4: Pt to negotiate 1 step with RW, SBA, for progression to home entry   Short term goal 5: Pt to complete car transfer with CGA for transportation needs MET see LTG  Short term goal 6: Pt to ascend/descend 4 steps with cristiana rails, CGA, to progress to home entry MET see LTG  Short term goal 6: Pt to ascend/descend 4 steps with cristiana rails, CGA, to progress to home entry MET see LTG  Long term goals  Time Frame for Long term goals : 3 wks  Long term goal 1: Pt to be Mod I, maintaining hip precautions, for supine <> sit to get in/out of bed  Long term goal 2: Pt to be Mod I, for sit <> stand to get up to ambulate  Long term goal 3: Pt to ambulate >=100 ft with RW with SBA for community mobility, >= 50 ft, Mod I for home mobility  Long term goal 4: Pt to ascend/descend 4+ steps with cristiana rails, Mod I;  curb step with RW, Mod I, for home entry  Long term goal 5: Pt to complete car transfer with SB A for transportation needs MET, continue    Following session, patient left in safe position with all fall risk precautions in place.

## 2019-08-14 NOTE — PROGRESS NOTES
Date: 2019  Patient Name: Randy Sharp        MRN: [id number]   Account: [de-identified]   : 1924  (89 y.o.)  Gender: female   Referring Practitioner: Claus Reese MD; Attending: Dr. Rubi Weir  Diagnosis: L Femoral Neck Fx s/p repair  Additional Pertinent Hx: Per ER note on 19: 80 y.o. female with a past medical history of HTN. The patient presents to the ED via EMS for evaluation of left leg weakness. The patient states that two days ago she felt a pain in her left thigh which was present for a couple hours and then resolved. The patient has not had any pain in her thigh since, however, began to have weakness in the left leg today. She reports having difficulty walking due to this weakness. s/p open tx femoral neck fracture with a prosthesis cemented on 19. Transfer to [facility] 19. Randy Sharp requires a Bedside Commode to complete bathing, toileting, dressing and grooming tasks. Patient requires a Bedside Commode due to Upper Extremity/Lower Extremity Weakness and limited ambulation and is unable to walk to the bathroom at home. Without the Bedside Commode, Randy Sharp is at increased risk for falls and would require increased assistance for ADL's and mobility.   Electronically signed by Chad Rubio MD on 8/15/2019 at 8:41 AM

## 2019-08-14 NOTE — PLAN OF CARE
Problem: Falls - Risk of:  Goal: Will remain free from falls  Description  Will remain free from falls  8/14/2019 0142 by Rudy Ruby RN  Outcome: Ongoing  Note:   No falls this shift. Patient did not set telesitter alarm off. Patient compliant with use of call light and verbalizes understanding of fall precautions. Patient is a stay with me for safety. 8/13/2019 1535 by Carmelina Alvares LPN  Outcome: Ongoing  Note:   Fall precautions. Problem: Falls - Risk of:  Goal: Absence of physical injury  Description  Absence of physical injury  8/14/2019 0142 by Rudy Ruby RN  Outcome: Ongoing     Problem: Pain:  Goal: Pain level will decrease  Description  Pain level will decrease  8/14/2019 0142 by Rudy Ruby RN  Outcome: Ongoing  Note:   Patient denies pain this shift. Pain goal is no pain. Pain does not interfere with activities     Problem: Pain:  Goal: Control of acute pain  Description  Control of acute pain  8/14/2019 0142 by Rudy Ruby RN  Outcome: Ongoing     Problem: Pain:  Goal: Control of chronic pain  Description  Control of chronic pain  8/14/2019 0142 by Rudy Ruby RN  Outcome: Ongoing     Problem: Mobility - Impaired:  Goal: Mobility will improve  Description  Mobility will improve  8/14/2019 0142 by Rudy Ruby RN  Outcome: Ongoing  Note:   Patient is up with one assist and a walker. Walked in room to the bathroom with minimal assist.  Feels as though mobility is getting better, but is tired easily. Problem: Activity:  Goal: Ability to ambulate will improve  Description  Ability to ambulate will improve  8/14/2019 0142 by Rudy Ruby RN  Outcome: Ongoing     Problem:  Activity:  Goal: Ability to perform activities at highest level will improve  Description  Ability to perform activities at highest level will improve  8/14/2019 0142 by Rudy Ruby RN  Outcome: Ongoing     Problem: Safety:  Goal: Ability

## 2019-08-15 LAB
ANION GAP SERPL CALCULATED.3IONS-SCNC: 11 MEQ/L (ref 8–16)
BUN BLDV-MCNC: 40 MG/DL (ref 7–22)
CALCIUM SERPL-MCNC: 9 MG/DL (ref 8.5–10.5)
CHLORIDE BLD-SCNC: 99 MEQ/L (ref 98–111)
CO2: 32 MEQ/L (ref 23–33)
CREAT SERPL-MCNC: 0.8 MG/DL (ref 0.4–1.2)
GFR SERPL CREATININE-BSD FRML MDRD: 67 ML/MIN/1.73M2
GLUCOSE BLD-MCNC: 94 MG/DL (ref 70–108)
POTASSIUM SERPL-SCNC: 4 MEQ/L (ref 3.5–5.2)
SODIUM BLD-SCNC: 142 MEQ/L (ref 135–145)

## 2019-08-15 PROCEDURE — 6370000000 HC RX 637 (ALT 250 FOR IP): Performed by: INTERNAL MEDICINE

## 2019-08-15 PROCEDURE — 36415 COLL VENOUS BLD VENIPUNCTURE: CPT

## 2019-08-15 PROCEDURE — 97535 SELF CARE MNGMENT TRAINING: CPT

## 2019-08-15 PROCEDURE — 80048 BASIC METABOLIC PNL TOTAL CA: CPT

## 2019-08-15 PROCEDURE — 97116 GAIT TRAINING THERAPY: CPT

## 2019-08-15 PROCEDURE — 1290000000 HC SEMI PRIVATE OTHER R&B

## 2019-08-15 PROCEDURE — 97127 HC SP THER IVNTJ W/FOCUS COG FUNCJ: CPT

## 2019-08-15 PROCEDURE — 97110 THERAPEUTIC EXERCISES: CPT

## 2019-08-15 PROCEDURE — 6370000000 HC RX 637 (ALT 250 FOR IP): Performed by: FAMILY MEDICINE

## 2019-08-15 RX ADMIN — Medication 1 CAPSULE: at 09:51

## 2019-08-15 RX ADMIN — MIDODRINE HYDROCHLORIDE 10 MG: 10 TABLET ORAL at 18:29

## 2019-08-15 RX ADMIN — MIDODRINE HYDROCHLORIDE 10 MG: 10 TABLET ORAL at 09:45

## 2019-08-15 RX ADMIN — POTASSIUM CHLORIDE 20 MEQ: 20 TABLET, EXTENDED RELEASE ORAL at 09:45

## 2019-08-15 RX ADMIN — MAGNESIUM GLUCONATE 500 MG ORAL TABLET 400 MG: 500 TABLET ORAL at 09:44

## 2019-08-15 RX ADMIN — BUMETANIDE 1 MG: 1 TABLET ORAL at 18:29

## 2019-08-15 RX ADMIN — MAGNESIUM GLUCONATE 500 MG ORAL TABLET 400 MG: 500 TABLET ORAL at 21:26

## 2019-08-15 RX ADMIN — FAMOTIDINE 20 MG: 20 TABLET ORAL at 09:45

## 2019-08-15 RX ADMIN — METOPROLOL SUCCINATE 12.5 MG: 25 TABLET, FILM COATED, EXTENDED RELEASE ORAL at 09:44

## 2019-08-15 RX ADMIN — Medication 300 MCG: at 09:46

## 2019-08-15 RX ADMIN — DIGOXIN 125 MCG: 125 TABLET ORAL at 09:45

## 2019-08-15 RX ADMIN — Medication 1 CAPSULE: at 22:53

## 2019-08-15 RX ADMIN — ACETAMINOPHEN 650 MG: 325 TABLET ORAL at 06:00

## 2019-08-15 RX ADMIN — BUMETANIDE 1 MG: 1 TABLET ORAL at 09:45

## 2019-08-15 RX ADMIN — MIDODRINE HYDROCHLORIDE 10 MG: 10 TABLET ORAL at 11:43

## 2019-08-15 ASSESSMENT — PAIN SCALES - GENERAL: PAINLEVEL_OUTOF10: 1

## 2019-08-15 NOTE — PROGRESS NOTES
Strengthening, Transfer Training, Endurance Training, ROM, Balance Training, Gait Training, Functional Mobility Training, Stair training, Safety Education & Training, Patient/Caregiver Education & Training, Equipment Evaluation, Education, & procurement, Home Exercise Program    Patient Education  Patient Education: Plan of Care, Precautions/Restrictions, Transfers, Gait, Stairs, Verbal Exercise Instruction    Goals:  Patient goals : get back home soon  Short term goals  Time Frame for Short term goals: 10 days  Short term goal 1: Pt to be CGA, maintaining hip precautions, for supine <> sit to get in/out of bed  MET see LTG  Short term goal 2: Pt to be SBA for sit <> stand to get up to ambulate MET see LTG  Short term goal 3: Pt to ambulate > 50 ft with RW with SBA for household distances MET see LTG  Short term goal 4: Pt to negotiate 1 step with RW, SBA, for progression to home entry   Short term goal 5: Pt to complete car transfer with CGA for transportation needs MET see LTG  Short term goal 6: Pt to ascend/descend 4 steps with cristiana rails, CGA, to progress to home entry MET see LTG  Short term goal 6: Pt to ascend/descend 4 steps with cristiana rails, CGA, to progress to home entry MET see LTG  Long term goals  Time Frame for Long term goals : 3 wks  Long term goal 1: Pt to be Mod I, maintaining hip precautions, for supine <> sit to get in/out of bed  Long term goal 2: Pt to be Mod I, for sit <> stand to get up to ambulate  Long term goal 3: Pt to ambulate >=100 ft with RW with SBA for community mobility, >= 50 ft, Mod I for home mobility  Long term goal 4: Pt to ascend/descend 4+ steps with cristiana rails, Mod I;  curb step with RW, Mod I, for home entry  Long term goal 5: Pt to complete car transfer with SB A for transportation needs MET, continue    Following session, patient left in safe position with all fall risk precautions in place.

## 2019-08-15 NOTE — PLAN OF CARE
Problem: Falls - Risk of:  Goal: Will remain free from falls  Description  Will remain free from falls  8/15/2019 0324 by Glo Aguilar RN  Outcome: Ongoing  Note:   No falls this shift. Patient uses call light appropriately to call for assistance. Understands need for safety precautions. Problem: Falls - Risk of:  Goal: Absence of physical injury  Description  Absence of physical injury  8/15/2019 0324 by Glo Aguilar RN  Outcome: Ongoing     Problem: Pain:  Goal: Pain level will decrease  Description  Pain level will decrease  8/15/2019 0324 by Glo Aguilar RN  Outcome: Ongoing  Note:   Patient rating pain 0/10. Pain goal is 0/10. Pain does not interfere with daily activities. Problem: Pain:  Goal: Control of acute pain  Description  Control of acute pain  8/15/2019 0324 by Glo Aguilar RN  Outcome: Ongoing     Problem: Pain:  Goal: Control of chronic pain  Description  Control of chronic pain  8/15/2019 0324 by Glo Aguilar RN  Outcome: Ongoing     Problem: Mobility - Impaired:  Goal: Mobility will improve  Description  Mobility will improve  8/15/2019 0324 by Glo Aguilar RN  Outcome: Ongoing  Note:   Patient is an assist with one and a walker. Was able to ambulate to the bathroom with minimal assistance. Problem: Activity:  Goal: Ability to ambulate will improve  Description  Ability to ambulate will improve  8/15/2019 0324 by Glo Aguilar RN  Outcome: Ongoing  Note:   Patient feels that ambulating is getting easier with therapy     Problem:  Activity:  Goal: Ability to perform activities at highest level will improve  Description  Ability to perform activities at highest level will improve  8/15/2019 0324 by Glo Aguilar RN  Outcome: Ongoing     Problem: Safety:  Goal: Ability to remain free from injury will improve  Description  Ability to remain free from injury will improve  8/15/2019 0324 by Glo Aguilar Discharge Planning:  Goal: Patients continuum of care needs are met  Description  Patients continuum of care needs are met  8/15/2019 0324 by Bonnie Gan RN  Outcome: Ongoing  Note:   Patine      Problem: Bowel/Gastric:  Goal: Complications related to the disease process, condition or treatment will be avoided or minimized  8/15/2019 0324 by Bonnie Gan RN  Outcome: Ongoing     Problem: Bowel/Gastric:  Goal: Will be independent with ostomy care  8/15/2019 0324 by Bonnie Gan RN  Outcome: Ongoing  Note:   Patient stated that she is independent with ostomy care at home, but that we have different bags. Wants to do herself. She is going to have family bring in her equipment so that she can preform ostomy care herself. Problem: Risk for Impaired Skin Integrity  Goal: Tissue integrity - skin and mucous membranes  Description  Structural intactness and normal physiological function of skin and  mucous membranes. 8/15/2019 0324 by Bonnie Gan RN  Outcome: Ongoing     Problem: IP COMMUNICATION/DYSARTHRIA  Goal: LTG - Patient will effectively communicate in all situations with the use of compensatory strategies  8/15/2019 0324 by Bonnie Gan RN  Outcome: Ongoing   Reviewed care plan with patient. Patient verbalizes understanding of care plan and goals.

## 2019-08-15 NOTE — PROGRESS NOTES
surfaces    Transfers:  Sit to Stand: Supervision, to/from chair with arms  Stand to [address] [place] [address], with verbal cues, to/from chair with arms    Ambulation:  Stand By Assistance, with verbal cues   Distance: 220' x 1,100' x 1,40' x 1,15' x 1  Surface: Level Tile  Device:Rolling Walker  Gait Deviations: Forward Flexed Posture, Decreased Heel Strike Bilaterally, Narrow Base of Support, Mild Path Deviations and 2 episodes pt. Bumping wheel of walker into an object in a narrow pathway    Stairs:   Close Stand By Assistance,  LT. Contact Guard Assistance, with verbal cues , with increased time for completion, non-recip. Number of Steps: 4  Height: 6\" step with Bilateral Handrails    Exercise:  Patient was guided in 1 set(s) 12-15 reps of exercise to both lower extremities. Standing heel/toe raises, Standing marches, Standing hip abduction/adduction, Standing hip extension, Standing hamstring curls, Mini squats, Step ups and no left LE hip abd/add ex. .  Exercises were completed for increased independence with functional mobility. Functional Outcome Measures: Not completed       ASSESSMENT:  Assessment: Patient progressing toward established goals. Activity Tolerance:  Patient tolerance of  treatment: good.       Equipment Recommendations:Equipment Needed: Yes(RW ordered from [facility])  Discharge Recommendations:  Continue to assess pending progress, Patient would benefit from continued therapy after discharge    Plan: Times per week: 6x/ wk  Current Treatment Recommendations: Strengthening, Transfer Training, Endurance Training, ROM, Balance Training, Gait Training, Functional Mobility Training, Stair training, Safety Education & Training, Patient/Caregiver Education & Training, Equipment Evaluation, Education, & procurement, Home Exercise Program    Patient Education  Patient Education: Home Exercise Program, Precautions/Restrictions, Equipment Education, Transfers, Reviewed Prior Education, Gait, Stairs, Verbal Exercise Instruction,  - Patient Verbalized Understanding, - Patient Requires Continued Education    Goals:  Patient goals : get back home soon  Short term goals  Time Frame for Short term goals: 10 days  Short term goal 1: Pt to be CGA, maintaining hip precautions, for supine <> sit to get in/out of bed  MET see LTG  Short term goal 2: Pt to be SBA for sit <> stand to get up to ambulate MET see LTG  Short term goal 3: Pt to ambulate > 50 ft with RW with SBA for household distances MET see LTG  Short term goal 4: Pt to negotiate 1 step with RW, SBA, for progression to home entry   Short term goal 5: Pt to complete car transfer with CGA for transportation needs MET see LTG  Short term goal 6: Pt to ascend/descend 4 steps with cristiana rails, CGA, to progress to home entry MET see LTG  Short term goal 6: Pt to ascend/descend 4 steps with cristiana rails, CGA, to progress to home entry MET see LTG  Long term goals  Time Frame for Long term goals : 3 wks  Long term goal 1: Pt to be Mod I, maintaining hip precautions, for supine <> sit to get in/out of bed  Long term goal 2: Pt to be Mod I, for sit <> stand to get up to ambulate  Long term goal 3: Pt to ambulate >=100 ft with RW with SBA for community mobility, >= 50 ft, Mod I for home mobility  Long term goal 4: Pt to ascend/descend 4+ steps with cristiana rails, Mod I;  curb step with RW, Mod I, for home entry  Long term goal 5: Pt to complete car transfer with SB A for transportation needs MET, continue    Following session, patient left in safe position with all fall risk precautions in place.

## 2019-08-15 NOTE — PLAN OF CARE
Problem: Falls - Risk of:  Goal: Will remain free from falls  Description  Will remain free from falls  8/15/2019 1054 by Stewart Li LPN  Outcome: Ongoing  Note:   Bed and chair alarm on   8/15/2019 0324 by Trey Forde RN  Outcome: Ongoing  Note:   No falls this shift. Patient uses call light appropriately to call for assistance. Understands need for safety precautions. Problem: Falls - Risk of:  Goal: Absence of physical injury  Description  Absence of physical injury  8/15/2019 1054 by Stewart Li LPN  Outcome: Ongoing  Note:   Call light within reach   8/15/2019 0324 by Trey Forde RN  Outcome: Ongoing     Problem:  Activity:  Goal: Ability to ambulate will improve  Description  Ability to ambulate will improve  8/15/2019 1054 by Stewart Li LPN  Outcome: Ongoing  Note:   Ambulate in brandt when possible   8/15/2019 0324 by Trey Forde RN  Outcome: Ongoing  Note:   Patient feels that ambulating is getting easier with therapy     Problem: Discharge Planning:  Goal: Patients continuum of care needs are met  Description  Patients continuum of care needs are met  8/15/2019 1054 by Stewart Li LPN  Outcome: Ongoing  Note:   Monitor until discharge   8/15/2019 0324 by Trey Forde RN  Outcome: Ongoing  Note:   [place]

## 2019-08-16 PROCEDURE — 1290000000 HC SEMI PRIVATE OTHER R&B

## 2019-08-16 PROCEDURE — 97535 SELF CARE MNGMENT TRAINING: CPT

## 2019-08-16 PROCEDURE — 6370000000 HC RX 637 (ALT 250 FOR IP): Performed by: INTERNAL MEDICINE

## 2019-08-16 PROCEDURE — 97110 THERAPEUTIC EXERCISES: CPT

## 2019-08-16 PROCEDURE — 6370000000 HC RX 637 (ALT 250 FOR IP): Performed by: FAMILY MEDICINE

## 2019-08-16 PROCEDURE — 97116 GAIT TRAINING THERAPY: CPT

## 2019-08-16 PROCEDURE — 97530 THERAPEUTIC ACTIVITIES: CPT

## 2019-08-16 PROCEDURE — 99231 SBSQ HOSP IP/OBS SF/LOW 25: CPT | Performed by: INTERNAL MEDICINE

## 2019-08-16 PROCEDURE — 97127 HC SP THER IVNTJ W/FOCUS COG FUNCJ: CPT

## 2019-08-16 RX ADMIN — MIDODRINE HYDROCHLORIDE 10 MG: 10 TABLET ORAL at 08:56

## 2019-08-16 RX ADMIN — Medication 1 CAPSULE: at 20:15

## 2019-08-16 RX ADMIN — Medication 300 MCG: at 08:57

## 2019-08-16 RX ADMIN — FAMOTIDINE 20 MG: 20 TABLET ORAL at 08:56

## 2019-08-16 RX ADMIN — MAGNESIUM GLUCONATE 500 MG ORAL TABLET 400 MG: 500 TABLET ORAL at 08:56

## 2019-08-16 RX ADMIN — Medication 1 CAPSULE: at 09:00

## 2019-08-16 RX ADMIN — DIGOXIN 125 MCG: 125 TABLET ORAL at 08:56

## 2019-08-16 RX ADMIN — POTASSIUM CHLORIDE 20 MEQ: 20 TABLET, EXTENDED RELEASE ORAL at 08:56

## 2019-08-16 RX ADMIN — MIDODRINE HYDROCHLORIDE 10 MG: 10 TABLET ORAL at 17:32

## 2019-08-16 RX ADMIN — MIDODRINE HYDROCHLORIDE 10 MG: 10 TABLET ORAL at 11:58

## 2019-08-16 RX ADMIN — MAGNESIUM GLUCONATE 500 MG ORAL TABLET 400 MG: 500 TABLET ORAL at 20:14

## 2019-08-16 RX ADMIN — BUMETANIDE 1 MG: 1 TABLET ORAL at 17:32

## 2019-08-16 RX ADMIN — METOPROLOL SUCCINATE 12.5 MG: 25 TABLET, FILM COATED, EXTENDED RELEASE ORAL at 08:56

## 2019-08-16 RX ADMIN — BUMETANIDE 1 MG: 1 TABLET ORAL at 08:56

## 2019-08-16 RX ADMIN — ACETAMINOPHEN 650 MG: 325 TABLET ORAL at 20:14

## 2019-08-16 ASSESSMENT — PAIN SCALES - GENERAL
PAINLEVEL_OUTOF10: 0
PAINLEVEL_OUTOF10: 4

## 2019-08-16 NOTE — PROGRESS NOTES
potential return to driving. -   INTERVENTIONS: DNT due to focus on additional STGs. SHORT TERM GOAL #4:  Goal 4: Patient will complete mildly complex functional problem solving tasks (e.g., medications, finances, etc.) with 80% accuracy and mod cues in order to improve completion of ADLs and IADLs upon discharge. -  INTERVENTIONS: DNT d/t focus on other goals   Prior session: Organization of calendar events: 7/10 indep, 1/10 min cues, 1/10 min-mod cues, 1/10 max cues  *decreased organization of recurrent events with max cues to improve  *decreased organization of date vs numbered prompt x1  *decreased comprehension of complex data (I.e. First and third Thursday)  Task duration= 20 minutes     Calculating bill/coin amounts: 2/6 supervision, 2/6 min cues, 2/6 mod cues   *decreased coin recognition with supervision assist to improve  *Decreased organization of bill/coin amounts with need for assistance (I.e. Executive functioning with written expression, adding up quarters to calculated monetary amount, etc). Improved as trials progressed  *decreased working memory   Task duration= 10 minutes     Long-Term Goals:  Timeframe for Long-term Goals: 6 weeks  LONG TERM GOAL #1:  Goal 1: Patient will improve cognitive skills to at least a Mod I level in order to improve completion of ADLs and IADLs with support from family upon discharge. - ONGOING      EDUCATION:  Learner: Patient  Education:  Reviewed ST goals and Plan of Care  Evaluation of Education: Lupe Banuelos understanding, Demonstrates without assistance and Family not present    ASSESSMENT/PLAN:  Activity Tolerance:  Patient tolerance of  treatment: Good   Assessment/Plan: Patient progressing toward established goals. Continues to require skilled care of licensed speech pathologist to progress toward achievement of established goals and plan of care. .     Plan for Next Session: problem solving, attention      Raul Tee M.S. [facility]-SLP [id number] 8/16/2019

## 2019-08-16 NOTE — PROGRESS NOTES
Renal Progress Note  [facility]    Patient :  Carmen Montalvo; 80 y.o. MRN# [id number]  Location:  8E70/070-A  Attending:  Gumaro Foote MD  Admit Date:  2019   Hospital Day: 10      Subjective:     Nephrology is following the patient for diuretic management and CKD. Patient seen and examined on TCU. Doing well. Edema improved. Weights have reached plateau. Bps stable. Outpatient Medications:     Medications Prior to Admission: [] HYDROcodone-acetaminophen (NORCO) 5-325 MG per tablet, Take 1-2 tablets by mouth every 4-6 hours as needed for Pain for up to 7 days. enoxaparin (LOVENOX) 40 MG/0.4ML injection, Inject 0.4 mLs into the skin daily  Flaxseed, Linseed, (FLAX SEED OIL) 1000 MG CAPS, Take 1,000 mg by mouth daily  Omega-3 Fatty Acids (FISH OIL) 1000 MG CAPS, Take 1,000 mg by mouth daily  Cyanocobalamin (VITAMIN B 12 PO), Take 500 mcg by mouth daily  Biotin 300 MCG TABS, Take 1 tablet by mouth daily  vitamin D 1000 units CAPS, Take 1 capsule by mouth daily  vitamin C (ASCORBIC ACID) 500 MG tablet, Take 500 mg by mouth daily  vitamin E 400 UNIT capsule, Take 400 Units by mouth daily  Multiple Vitamins-Minerals (PRESERVISION AREDS PO), Take 1 tablet by mouth 2 times daily   aspirin 81 MG tablet, Take 81 mg by mouth daily.   Multiple Vitamins-Minerals (MULTIVITAMIN PO), Take 0.5 tablets by mouth daily   lisinopril (PRINIVIL;ZESTRIL) 20 MG tablet, Take 10 mg by mouth daily     Current Medications:     Scheduled Meds:    potassium chloride  20 mEq Oral Daily    metoprolol succinate  12.5 mg Oral Daily    famotidine  20 mg Oral Daily    bumetanide  1 mg Oral BID    digoxin  125 mcg Oral Daily    lidocaine  1 patch Transdermal Daily    magnesium oxide  400 mg Oral BID    midodrine  10 mg Oral TID WC    polyethylene glycol  17 g Oral Daily    pneumococcal 13-valent conjugate  0.5 mL Intramuscular Once    Biotin  300 mcg Oral Daily    PRESERVISION AREDS  1 capsule

## 2019-08-16 NOTE — PROGRESS NOTES
for balance. Patient required 0 seated rest breaks throughout task and 1 seated rest break after task with a standing endurance of 8 minutes 10 seconds. ASSESSMENT:  Activity Tolerance:  Patient tolerance of  treatment: fair. Discharge Recommendations: Home with Home health OT(Recommended initial 24 hour supervision once discharged for safety)  Equipment Recommendations: Equipment Needed: Yes  Other: Ordered LHAE and BSC 8/14. Daughter in law to purchase transfer tub bench and install grab bars and hand held shower head into shower  Plan: Times per week: 6x  Current Treatment Recommendations: Balance Training, Self-Care / ADL, Functional Mobility Training, Patient/Caregiver Education & Training, Endurance Training, Safety Education & Training, Equipment Evaluation, Education, & procurement    Patient Education  Patient Education: ADL's, IADL's and Home Exercise Program    Goals  Short term goals  Time Frame for Short term goals: 1 week  Short term goal 1: Pt will verbalize and demo all hip precautions during OT session to increase safety during ADLs. Short term goal 2: Pt will safely navigate to/from bathroom with S, RW, & min vcs for walker safety to increase indep with accessing environment during ADLs. Short term goal 3: Pt will tolerate dynamic standing task x 8 min with S for increased ease of sinkside grooming. Short term goal 4: Complete LE dressing with SBA, LHAE, & min vcs for THR precautions to increase indep with self care. Long term goals  Time Frame for Long term goals : 2 weeks  Long term goal 1: Pt will complete ADL routine including shower t/f with Justo to increase indep with self care. Long term goal 2: Pt will complete light IADL task with Jsuto to restore PLOF with cooking and cleaning. Following session, patient left in safe position with all fall risk precautions in place.

## 2019-08-16 NOTE — PROGRESS NOTES
Patient Verbalized Understanding, - Patient Requires Continued Education    Goals:  Patient goals : get back home soon  Short term goals  Time Frame for Short term goals: 10 days  Short term goal 1: Pt to be CGA, maintaining hip precautions, for supine <> sit to get in/out of bed  MET see LTG  Short term goal 2: Pt to be SBA for sit <> stand to get up to ambulate MET see LTG  Short term goal 3: Pt to ambulate > 50 ft with RW with SBA for household distances MET see LTG  Short term goal 4: Pt to negotiate 1 step with RW, SBA, for progression to home entry   Short term goal 5: Pt to complete car transfer with CGA for transportation needs MET see LTG  Short term goal 6: Pt to ascend/descend 4 steps with cristiana rails, CGA, to progress to home entry MET see LTG  Short term goal 6: Pt to ascend/descend 4 steps with cristiana rails, CGA, to progress to home entry MET see LTG  Long term goals  Time Frame for Long term goals : 3 wks  Long term goal 1: Pt to be Mod I, maintaining hip precautions, for supine <> sit to get in/out of bed  Long term goal 2: Pt to be Mod I, for sit <> stand to get up to ambulate  Long term goal 3: Pt to ambulate >=100 ft with RW with SBA for community mobility, >= 50 ft, Mod I for home mobility  Long term goal 4: Pt to ascend/descend 4+ steps with cristiana rails, Mod I;  curb step with RW, Mod I, for home entry  Long term goal 5: Pt to complete car transfer with SB A for transportation needs MET, continue    Following session, patient left in safe position with all fall risk precautions in place.

## 2019-08-17 PROBLEM — Z98.890 HISTORY OF HIP SURGERY: Status: ACTIVE | Noted: 2019-08-17

## 2019-08-17 PROCEDURE — 6370000000 HC RX 637 (ALT 250 FOR IP): Performed by: INTERNAL MEDICINE

## 2019-08-17 PROCEDURE — 6370000000 HC RX 637 (ALT 250 FOR IP): Performed by: FAMILY MEDICINE

## 2019-08-17 PROCEDURE — 1290000000 HC SEMI PRIVATE OTHER R&B

## 2019-08-17 RX ADMIN — FAMOTIDINE 20 MG: 20 TABLET ORAL at 09:34

## 2019-08-17 RX ADMIN — Medication 300 MCG: at 09:19

## 2019-08-17 RX ADMIN — Medication 1 CAPSULE: at 09:36

## 2019-08-17 RX ADMIN — ACETAMINOPHEN 650 MG: 325 TABLET ORAL at 09:50

## 2019-08-17 RX ADMIN — BUMETANIDE 1 MG: 1 TABLET ORAL at 08:00

## 2019-08-17 RX ADMIN — MAGNESIUM GLUCONATE 500 MG ORAL TABLET 400 MG: 500 TABLET ORAL at 20:49

## 2019-08-17 RX ADMIN — ACETAMINOPHEN 650 MG: 325 TABLET ORAL at 05:11

## 2019-08-17 RX ADMIN — POTASSIUM CHLORIDE 20 MEQ: 20 TABLET, EXTENDED RELEASE ORAL at 09:34

## 2019-08-17 RX ADMIN — METOPROLOL SUCCINATE 12.5 MG: 25 TABLET, FILM COATED, EXTENDED RELEASE ORAL at 09:55

## 2019-08-17 RX ADMIN — BUMETANIDE 1 MG: 1 TABLET ORAL at 17:09

## 2019-08-17 RX ADMIN — MIDODRINE HYDROCHLORIDE 10 MG: 10 TABLET ORAL at 17:09

## 2019-08-17 RX ADMIN — Medication 1 CAPSULE: at 20:49

## 2019-08-17 RX ADMIN — DIGOXIN 125 MCG: 125 TABLET ORAL at 09:17

## 2019-08-17 RX ADMIN — MAGNESIUM GLUCONATE 500 MG ORAL TABLET 400 MG: 500 TABLET ORAL at 09:16

## 2019-08-17 RX ADMIN — MIDODRINE HYDROCHLORIDE 10 MG: 10 TABLET ORAL at 08:00

## 2019-08-17 RX ADMIN — MIDODRINE HYDROCHLORIDE 10 MG: 10 TABLET ORAL at 12:54

## 2019-08-17 ASSESSMENT — PAIN SCALES - GENERAL
PAINLEVEL_OUTOF10: 4
PAINLEVEL_OUTOF10: 0
PAINLEVEL_OUTOF10: 9
PAINLEVEL_OUTOF10: 4

## 2019-08-17 ASSESSMENT — PAIN DESCRIPTION - PAIN TYPE: TYPE: SURGICAL PAIN

## 2019-08-17 ASSESSMENT — PAIN DESCRIPTION - DESCRIPTORS: DESCRIPTORS: ACHING;DISCOMFORT

## 2019-08-17 ASSESSMENT — PAIN DESCRIPTION - FREQUENCY: FREQUENCY: INTERMITTENT

## 2019-08-17 ASSESSMENT — PAIN DESCRIPTION - LOCATION: LOCATION: HIP

## 2019-08-17 ASSESSMENT — PAIN DESCRIPTION - ONSET: ONSET: ON-GOING

## 2019-08-17 ASSESSMENT — PAIN DESCRIPTION - ORIENTATION: ORIENTATION: LEFT

## 2019-08-18 ENCOUNTER — APPOINTMENT (OUTPATIENT)
Dept: GENERAL RADIOLOGY | Age: 84
DRG: 559 | End: 2019-08-18
Attending: FAMILY MEDICINE
Payer: MEDICARE

## 2019-08-18 PROCEDURE — 97530 THERAPEUTIC ACTIVITIES: CPT

## 2019-08-18 PROCEDURE — 97535 SELF CARE MNGMENT TRAINING: CPT

## 2019-08-18 PROCEDURE — 1290000000 HC SEMI PRIVATE OTHER R&B

## 2019-08-18 PROCEDURE — 6370000000 HC RX 637 (ALT 250 FOR IP): Performed by: INTERNAL MEDICINE

## 2019-08-18 PROCEDURE — 71046 X-RAY EXAM CHEST 2 VIEWS: CPT

## 2019-08-18 PROCEDURE — 6370000000 HC RX 637 (ALT 250 FOR IP): Performed by: FAMILY MEDICINE

## 2019-08-18 PROCEDURE — 97116 GAIT TRAINING THERAPY: CPT

## 2019-08-18 RX ADMIN — POTASSIUM CHLORIDE 20 MEQ: 20 TABLET, EXTENDED RELEASE ORAL at 08:58

## 2019-08-18 RX ADMIN — Medication 1 CAPSULE: at 08:58

## 2019-08-18 RX ADMIN — FAMOTIDINE 20 MG: 20 TABLET ORAL at 08:58

## 2019-08-18 RX ADMIN — METOPROLOL SUCCINATE 12.5 MG: 25 TABLET, FILM COATED, EXTENDED RELEASE ORAL at 09:00

## 2019-08-18 RX ADMIN — DIGOXIN 125 MCG: 125 TABLET ORAL at 08:58

## 2019-08-18 RX ADMIN — BUMETANIDE 1 MG: 1 TABLET ORAL at 17:30

## 2019-08-18 RX ADMIN — ACETAMINOPHEN 650 MG: 325 TABLET ORAL at 05:49

## 2019-08-18 RX ADMIN — MIDODRINE HYDROCHLORIDE 10 MG: 10 TABLET ORAL at 17:30

## 2019-08-18 RX ADMIN — Medication 300 MCG: at 08:58

## 2019-08-18 RX ADMIN — Medication 1 CAPSULE: at 20:33

## 2019-08-18 RX ADMIN — MIDODRINE HYDROCHLORIDE 10 MG: 10 TABLET ORAL at 08:58

## 2019-08-18 RX ADMIN — MIDODRINE HYDROCHLORIDE 10 MG: 10 TABLET ORAL at 12:02

## 2019-08-18 RX ADMIN — MAGNESIUM GLUCONATE 500 MG ORAL TABLET 400 MG: 500 TABLET ORAL at 08:58

## 2019-08-18 RX ADMIN — MAGNESIUM GLUCONATE 500 MG ORAL TABLET 400 MG: 500 TABLET ORAL at 20:33

## 2019-08-18 RX ADMIN — BUMETANIDE 1 MG: 1 TABLET ORAL at 08:58

## 2019-08-18 ASSESSMENT — PAIN SCALES - GENERAL
PAINLEVEL_OUTOF10: 0

## 2019-08-18 NOTE — PROGRESS NOTES
[facility]  INPATIENT PHYSICAL THERAPY  DAILY NOTE  [facility] SKILLED NURSING UNIT - [id number]    Time In: 0800  Time Out: 0831  Timed Code Treatment Minutes: 32 Minutes  Minutes: 31          Date: 2019  Patient Name: Joe Smoker,  Gender:  female        MRN: [id number]  : 1924  (89 y.o.)     Referring Practitioner: JIMENA Bullock MD.  Dr. Armando Peacock, attending  Diagnosis: Lt femoral neck fx, s/p repair  Additional Pertinent Hx: Admitted [facility] ED due to Lt leg weakness and pain. .  Edema BLEs. x-ray noted Lt femoral neck fracture. Pt underwent HR 2019     Prior Level of Function:  Lives With: Alone  Type of Home: House  Home Layout: One level, Laundry in basement  Home Access: Stairs to enter with rails  Entrance Stairs - Number of Steps: 2  Entrance Stairs - Rails: Left(Dtr in law stated cristiana rails to be added prior to discharge)  Home Equipment: (None used PTA)   Bathroom Shower/Tub: Tub/Shower unit(typically bathes in bath-tub)  Bathroom Toilet: Standard    Receives Help From: Family  ADL Assistance: Independent  Homemaking Assistance: Independent  Homemaking Responsibilities: Yes  Ambulation Assistance: Independent  Transfer Assistance: Independent  Active : Yes  IADL Comments: Son and daughter-in-law live nearby and able to assist as needed. Daughter-in-law completes the cleaning at times. Additional Comments: Patient is very active for her age    Restrictions/Precautions:  Restrictions/Precautions: Weight Bearing, Fall Risk, General Precautions, Surgical Protocols  Left Lower Extremity Weight Bearing: Weight Bearing As Tolerated  Position Activity Restriction  Hip Precautions: No hip flexion > 90 degrees, No hip external rotation, No hip internal rotation, No ADduction  Other position/activity restrictions: THR precautions    SUBJECTIVE: pleasant, reports increased pain developing in her left hip/LE Friday night and throughout the day SAT.  Somewhat better this am    PAIN: 5/10:     OBJECTIVE:  Bed Mobility:  Rolling to left and to right with hob flat, no rails MOD I  Sit<-> supine:  MOD I with hob flat, no rails  Scooting: Modified Independent scoot to edge of sitting surfaces    Transfers:  Sit to Stand: MOD I to/from chair with arms  Stand to Sit:Supervision, occasional impulsiveness with approaching to sit  Car:Stand By Assistance, with increased time for completion, with verbal cues    Ambulation:  Stand By Assistance, with cues for safety, occasionally impulsive and not focusing on where she is going at times  Distance: 150' x 1,200' x 1,13' x 1  Surface: Level Tile, Uneven Surface, Carpet and Ramp  Device:Rolling Walker  Gait Deviations: Forward Flexed Posture, Decreased Step Length Bilaterally, Decreased Weight Shift Left, Decreased Heel Strike Bilaterally, Narrow Base of Support and Mild Path Deviations    Stairs:  Stand By Assistance, [employer], with verbal cues , with increased time for completion, cga-> close sba    (porch step)  Number of Steps: 1  Height: 6\" step with Rolling Walker    Stairs:  Stand By Assistance, with verbal cues , vc for non-reciprocally  Number of Steps: 4  Height: 6\" step with Bilateral Handrails    Stairs:  Stand By Assistance, with verbal cues , NON-RECIP. Number of Steps: 12     (4 steps x 3 trials)  Height: 6\" step with Bilateral Handrails    Balance:  Dynamic Standing Balance: Modified Independent with 1 ue support on walker pt. Was able to pick objects up from floor with use of a reacher      Exercise:  No there. Ex this am  Functional Outcome Measures: Not completed       ASSESSMENT:  Assessment: Patient progressing toward established goals. Activity Tolerance:  Patient tolerance of  treatment: good.       Equipment Recommendations:Equipment Needed: Yes(RW ordered from [facility])  Discharge Recommendations:  Continue to assess pending progress, Patient would benefit from continued therapy after discharge    Plan: Times per week: 6x/ wk  Current Treatment Recommendations: Strengthening, Transfer Training, Endurance Training, ROM, Balance Training, Gait Training, Functional Mobility Training, Stair training, Safety Education & Training, Patient/Caregiver Education & Training, Equipment Evaluation, Education, & procurement, Home Exercise Program    Patient Education  Patient Education: Precautions/Restrictions, [employer], Transfers, Reviewed Prior Education, Gait, Stairs, Car Transfers, Verbal Exercise Instruction,  - Patient Verbalized Understanding, - Patient Requires Continued Education    Goals:  Patient goals : get back home soon  Short term goals  Time Frame for Short term goals: 10 days  Short term goal 1: Pt to be CGA, maintaining hip precautions, for supine <> sit to get in/out of bed  MET see LTG  Short term goal 2: Pt to be SBA for sit <> stand to get up to ambulate MET see LTG  Short term goal 3: Pt to ambulate > 50 ft with RW with SBA for household distances MET see LTG  Short term goal 4: Pt to negotiate 1 step with RW, SBA, for progression to home entry   Short term goal 5: Pt to complete car transfer with CGA for transportation needs MET see LTG  Short term goal 6: Pt to ascend/descend 4 steps with cristiana rails, CGA, to progress to home entry MET see LTG  Short term goal 6: Pt to ascend/descend 4 steps with cristiana rails, CGA, to progress to home entry MET see LTG  Long term goals  Time Frame for Long term goals : 3 wks  Long term goal 1: Pt to be Mod I, maintaining hip precautions, for supine <> sit to get in/out of bed  Long term goal 2: Pt to be Mod I, for sit <> stand to get up to ambulate  Long term goal 3: Pt to ambulate >=100 ft with RW with SBA for community mobility, >= 50 ft, Mod I for home mobility  Long term goal 4: Pt to ascend/descend 4+ steps with cristiana rails, Mod I;  curb step with RW, Mod I, for home entry  Long term goal 5: Pt to complete car transfer with SB A for transportation needs MET, continue    Following session, patient left in safe position with all fall risk precautions in place.

## 2019-08-18 NOTE — PROGRESS NOTES
[address] [address]  [address] [place] SKILLED NURSING UNIT  Occupational Therapy  Daily Note    Time In: 930  Time Out: 1000  Timed Code Treatment Minutes: 30 Minutes  Minutes: 30          Date: 2019  Patient Name: Beatris Madsen,   Gender: female      Room: [facility]-  MRN: [id number]  : 1924  (89 y.o.)  Referring Practitioner: Rosas Burgess MD; Attending: Dr. Karey Kocher  Diagnosis: L Femoral Neck Fx s/p repair  Additional Pertinent Hx: Per ER note on 19: 80 y.o. female with a past medical history of HTN. The patient presents to the ED via EMS for evaluation of left leg weakness. The patient states that two days ago she felt a pain in her left thigh which was present for a couple hours and then resolved. The patient has not had any pain in her thigh since, however, began to have weakness in the left leg today. She reports having difficulty walking due to this weakness. s/p open tx femoral neck fracture with a prosthesis cemented on 19. Transfer to [facility] 19. Restrictions/Precautions:  Restrictions/Precautions: Weight Bearing, Fall Risk, General Precautions, Surgical Protocols  Left Lower Extremity Weight Bearing: Weight Bearing As Tolerated  Position Activity Restriction  Hip Precautions: No hip flexion > 90 degrees, No hip external rotation, No hip internal rotation, No ADduction  Other position/activity restrictions: THR precautions    SUBJECTIVE: Pt. Seated in recliner upon arrival, all ADLs completed prior to arrival, mod encouragement to participate, Pt. Stating she has to be done by 10 as she is expecting a phone call    PAIN: denies/10:     COGNITION: WNL    ADL:   Grooming: Independent. Lower Extremity Dressing: Modified Independent. Using LHAE to don socks  Toileting: Modified Independent. Toilet Transfer: Modified Independent. Pauly Cordova        BALANCE:  Sitting Balance:  Modified Independent  Standing Balance: Modified Independent      TRANSFERS:  Sit to Stand:  Modified

## 2019-08-18 NOTE — PROGRESS NOTES
Ht 5' 5\" (1.651 m)   Wt 136 lb 12.8 oz (62.1 kg)   SpO2 97%   BMI 22.76 kg/m²   General appearance: alert, appears stated age and cooperative  Head: Normocephalic, without obvious abnormality, atraumatic  Lungs: clear to auscultation bilaterally  Heart: regular rate and rhythm, S1, S2 normal, no murmur, click, rub or gallop  Abdomen: soft, non-tender; bowel sounds normal; no masses,  no organomegaly  Extremities: extremities normal, atraumatic, no cyanosis or edema  Skin: Skin color, texture, turgor normal. No rashes or lesions  Neurologic: Grossly normal    Data Review:     Results for Vaughn Wesley (MRN [id number]) as of 8/17/2019 20:03   Ref.  Range 8/9/2019 05:05 8/11/2019 02:30 8/11/2019 04:26 8/12/2019 04:50 8/15/2019 04:41   Sodium Latest Ref Range: 135 - 145 meq/L 137   139 142   Potassium Latest Ref Range: 3.5 - 5.2 meq/L 3.9   4.1 4.0   Chloride Latest Ref Range: 98 - 111 meq/L 96 (L)   98 99   CO2 Latest Ref Range: 23 - 33 meq/L 29   28 32   BUN Latest Ref Range: 7 - 22 mg/dL 39 (H)   42 (H) 40 (H)   Creatinine Latest Ref Range: 0.4 - 1.2 mg/dL 0.9   0.9 0.8   Anion Gap Latest Ref Range: 8.0 - 16.0 meq/L 12.0   13.0 11.0   Est, Glom Filt Rate Latest Units: ml/min/1.73m2 58 (A)   58 (A) 67 (A)   Glucose Latest Ref Range: 70 - 108 mg/dL 91   103 94   Calcium Latest Ref Range: 8.5 - 10.5 mg/dL 8.8   8.7 9.0   Digoxin Lvl Latest Ref Range: 0.5 - 2.0 ng/mL 0.7       WBC Latest Ref Range: 4.8 - 10.8 thou/mm3   9.8     RBC Latest Ref Range: 4.20 - 5.40 mill/mm3   3.70 (L)     Hemoglobin Quant Latest Ref Range: 12.0 - 16.0 gm/dl   10.9 (L)     Hematocrit Latest Ref Range: 37.0 - 47.0 %   34.3 (L)     MCV Latest Ref Range: 81.0 - 99.0 fL   92.7     MCH Latest Ref Range: 26.0 - 33.0 pg   29.5     MCHC Latest Ref Range: 32.2 - 35.5 gm/dl   31.8 (L)     MPV Latest Ref Range: 9.4 - 12.4 fL   10.5     RDW-CV Latest Ref Range: 11.5 - 14.5 %   18.7 (H)     RDW-SD Latest Ref Range: 35.0 - 45.0 fL   61.5 (H)

## 2019-08-19 VITALS
TEMPERATURE: 97.8 F | BODY MASS INDEX: 22.29 KG/M2 | DIASTOLIC BLOOD PRESSURE: 54 MMHG | WEIGHT: 133.8 LBS | RESPIRATION RATE: 17 BRPM | HEART RATE: 72 BPM | HEIGHT: 65 IN | SYSTOLIC BLOOD PRESSURE: 103 MMHG | OXYGEN SATURATION: 94 %

## 2019-08-19 LAB
ANION GAP SERPL CALCULATED.3IONS-SCNC: 11 MEQ/L (ref 8–16)
ANISOCYTOSIS: PRESENT
BASOPHILS # BLD: 1 %
BASOPHILS ABSOLUTE: 0.1 THOU/MM3 (ref 0–0.1)
BUN BLDV-MCNC: 36 MG/DL (ref 7–22)
CALCIUM SERPL-MCNC: 8.9 MG/DL (ref 8.5–10.5)
CHLORIDE BLD-SCNC: 101 MEQ/L (ref 98–111)
CO2: 29 MEQ/L (ref 23–33)
CREAT SERPL-MCNC: 0.9 MG/DL (ref 0.4–1.2)
EOSINOPHIL # BLD: 4.9 %
EOSINOPHILS ABSOLUTE: 0.3 THOU/MM3 (ref 0–0.4)
ERYTHROCYTE [DISTWIDTH] IN BLOOD BY AUTOMATED COUNT: 19.4 % (ref 11.5–14.5)
ERYTHROCYTE [DISTWIDTH] IN BLOOD BY AUTOMATED COUNT: 69.6 FL (ref 35–45)
GFR SERPL CREATININE-BSD FRML MDRD: 58 ML/MIN/1.73M2
GLUCOSE BLD-MCNC: 134 MG/DL (ref 70–108)
HCT VFR BLD CALC: 35.6 % (ref 37–47)
HEMOGLOBIN: 10.8 GM/DL (ref 12–16)
IMMATURE GRANS (ABS): 0.03 THOU/MM3 (ref 0–0.07)
IMMATURE GRANULOCYTES: 0 %
LYMPHOCYTES # BLD: 29 %
LYMPHOCYTES ABSOLUTE: 1.9 THOU/MM3 (ref 1–4.8)
MCH RBC QN AUTO: 30.2 PG (ref 26–33)
MCHC RBC AUTO-ENTMCNC: 30.3 GM/DL (ref 32.2–35.5)
MCV RBC AUTO: 99.4 FL (ref 81–99)
MONOCYTES # BLD: 11.5 %
MONOCYTES ABSOLUTE: 0.8 THOU/MM3 (ref 0.4–1.3)
NUCLEATED RED BLOOD CELLS: 0 /100 WBC
PLATELET # BLD: 149 THOU/MM3 (ref 130–400)
PMV BLD AUTO: 10.2 FL (ref 9.4–12.4)
POTASSIUM SERPL-SCNC: 3.7 MEQ/L (ref 3.5–5.2)
RBC # BLD: 3.58 MILL/MM3 (ref 4.2–5.4)
SEG NEUTROPHILS: 53.2 %
SEGMENTED NEUTROPHILS ABSOLUTE COUNT: 3.6 THOU/MM3 (ref 1.8–7.7)
SODIUM BLD-SCNC: 141 MEQ/L (ref 135–145)
WBC # BLD: 6.7 THOU/MM3 (ref 4.8–10.8)

## 2019-08-19 PROCEDURE — 80048 BASIC METABOLIC PNL TOTAL CA: CPT

## 2019-08-19 PROCEDURE — 85025 COMPLETE CBC W/AUTO DIFF WBC: CPT

## 2019-08-19 PROCEDURE — 6370000000 HC RX 637 (ALT 250 FOR IP): Performed by: INTERNAL MEDICINE

## 2019-08-19 PROCEDURE — 36415 COLL VENOUS BLD VENIPUNCTURE: CPT

## 2019-08-19 PROCEDURE — 97110 THERAPEUTIC EXERCISES: CPT

## 2019-08-19 PROCEDURE — 6370000000 HC RX 637 (ALT 250 FOR IP): Performed by: FAMILY MEDICINE

## 2019-08-19 PROCEDURE — 97116 GAIT TRAINING THERAPY: CPT

## 2019-08-19 PROCEDURE — 97530 THERAPEUTIC ACTIVITIES: CPT

## 2019-08-19 PROCEDURE — 97535 SELF CARE MNGMENT TRAINING: CPT

## 2019-08-19 RX ORDER — POTASSIUM CHLORIDE 20 MEQ/1
20 TABLET, EXTENDED RELEASE ORAL DAILY
Qty: 10 TABLET | Refills: 0 | Status: SHIPPED | OUTPATIENT
Start: 2019-08-19 | End: 2019-10-08

## 2019-08-19 RX ORDER — BUMETANIDE 1 MG/1
1 TABLET ORAL 2 TIMES DAILY
Qty: 30 TABLET | Refills: 0 | Status: SHIPPED | OUTPATIENT
Start: 2019-08-19 | End: 2019-10-08

## 2019-08-19 RX ORDER — DIGOXIN 125 MCG
125 TABLET ORAL DAILY
Qty: 20 TABLET | Refills: 0 | Status: SHIPPED | OUTPATIENT
Start: 2019-08-19 | End: 2019-10-24 | Stop reason: SDUPTHER

## 2019-08-19 RX ORDER — METOPROLOL SUCCINATE 25 MG/1
12.5 TABLET, EXTENDED RELEASE ORAL DAILY
Qty: 10 TABLET | Refills: 0 | Status: SHIPPED | OUTPATIENT
Start: 2019-08-19 | End: 2019-10-16 | Stop reason: SDUPTHER

## 2019-08-19 RX ORDER — MIDODRINE HYDROCHLORIDE 10 MG/1
10 TABLET ORAL
Qty: 30 TABLET | Refills: 0 | Status: SHIPPED | OUTPATIENT
Start: 2019-08-19 | End: 2019-10-16 | Stop reason: SDUPTHER

## 2019-08-19 RX ORDER — VIT A/VIT C/VIT E/ZINC/COPPER 4296-226
1 CAPSULE ORAL 2 TIMES DAILY
COMMUNITY
Start: 2019-08-19

## 2019-08-19 RX ADMIN — DIGOXIN 125 MCG: 125 TABLET ORAL at 09:28

## 2019-08-19 RX ADMIN — FAMOTIDINE 20 MG: 20 TABLET ORAL at 09:28

## 2019-08-19 RX ADMIN — MIDODRINE HYDROCHLORIDE 10 MG: 10 TABLET ORAL at 09:38

## 2019-08-19 RX ADMIN — Medication 1 CAPSULE: at 09:30

## 2019-08-19 RX ADMIN — MAGNESIUM GLUCONATE 500 MG ORAL TABLET 400 MG: 500 TABLET ORAL at 09:28

## 2019-08-19 RX ADMIN — BUMETANIDE 1 MG: 1 TABLET ORAL at 16:31

## 2019-08-19 RX ADMIN — POTASSIUM CHLORIDE 20 MEQ: 20 TABLET, EXTENDED RELEASE ORAL at 09:28

## 2019-08-19 RX ADMIN — MIDODRINE HYDROCHLORIDE 10 MG: 10 TABLET ORAL at 12:17

## 2019-08-19 RX ADMIN — BUMETANIDE 1 MG: 1 TABLET ORAL at 09:28

## 2019-08-19 RX ADMIN — METOPROLOL SUCCINATE 12.5 MG: 25 TABLET, FILM COATED, EXTENDED RELEASE ORAL at 09:29

## 2019-08-19 RX ADMIN — Medication 300 MCG: at 09:30

## 2019-08-19 ASSESSMENT — PAIN SCALES - GENERAL: PAINLEVEL_OUTOF10: 0

## 2019-08-19 NOTE — PROGRESS NOTES
[address] [facility] THERAPY  [facility] SKILLED NURSING UNIT  DISCHARGE NOTE    No therapy services provided discharge only. Date: 2019  Patient Name: Roslyn Lehman      CSN: [id number]   : 1924  (89 y.o.)  Gender: female   Referring Physician:  Delona Denver, MD  Diagnosis: Left femoral neck fracture s/p repair  Secondary Diagnosis: Cognitive impairment  Precautions: Fall risk  Current Diet: Regular, thin liquids   Swallowing Strategies: Standard Universal Swallow Precautions  Date of Last MBS: Not Applicable    Pain:  No pain reported. Subjective:  No therapy services provided, discharge only. Short-Term Goals:  SHORT TERM GOAL #1:  Goal 1: Patient will complete mildly complex visual/verbal reasoning tasks with 80% accuracy and mod cues in order to improve completion of ADLs and IADLs upon discharge. GOAL MET  INTERVENTIONS:   Prior Session:  Pt provided with [employer] shopping advertisements and asked to locate and determine specific targets for each:  indep, errors in  with feedback provided  Task duration= 30 minutes   *decreased selective attention x1, decreased reading comprehension x2, decreased problem solving x1 resulting in errors     Emmy Monsivais [address] #2:  Goal 2: Patient will complete immediate, delayed, and working memory tasks with implementation of compensatory strategies with 80% accuracy and mod cues in order to improve retention of novel information. GOAL MET  INTERVENTIONS:   Prior Session:  Orientation: 100% accuracy indep  Recall of discharge date: indep     Immediate and delayed recall of grocery list.  Immediate recall: 5/6 independent, 1/6 with max cues  10 minute recall: 6/6 independent  *Patient with excellent recall of grocery list following rehearsal compensatory strategies.     SHORT TERM GOAL #3:  Goal 3: Patient will complete sustained, alternating, divided, and selective attention tasks with 80% accuracy and mod cues in order to determine appropriateness for potential return to driving. -   INTERVENTIONS: GOAL NOT MET  Prior Session:  Patient with appropriate sustained attention throughout 15 minute treatment session. Will continue to target alternating, divided and selective attention tasks     SHORT TERM GOAL #4:  Goal 4: Patient will complete mildly complex functional problem solving tasks (e.g., medications, finances, etc.) with 80% accuracy and mod cues in order to improve completion of ADLs and IADLs upon discharge. GOAL NOT MET  INTERVENTIONS:   Prior session:   Organization of calendar events: 7/10 indep, 1/10 min cues, 1/10 min-mod cues, 1/10 max cues  *decreased organization of recurrent events with max cues to improve  *decreased organization of date vs numbered prompt x1  *decreased comprehension of complex data (I.e. First and third Thursday)  Task duration= 20 minutes     Calculating bill/coin amounts: 2/6 supervision, 2/6 min cues, 2/6 mod cues   *decreased coin recognition with supervision assist to improve  *Decreased organization of bill/coin amounts with need for assistance (I.e. Executive functioning with written expression, adding up quarters to calculated monetary amount, etc). Improved as trials progressed  *decreased working memory   Task duration= 10 minutes     Long-Term Goals:  Timeframe for Long-term Goals: 6 weeks  LONG TERM GOAL #1:  Goal 1: Patient will improve cognitive skills to at least a Mod I level in order to improve completion of ADLs and IADLs with support from family upon discharge. GOAL NOT MET      EDUCATION:  Learner: Patient  Education:  Reviewed ST goals and Plan of Care  Evaluation of Education: Verbalizes understanding, Demonstrates without assistance and Family not present    ASSESSMENT/PLAN:  Patient has met 2/4 STGs and 0/1 LTGs for this therapy reporting period. Patient with improvements in delayed recall, visual/verbal reasoning tasks, and processing speed.  Patient does continue to

## 2019-08-19 NOTE — DISCHARGE SUMMARY
Biotin 300 MCG TABS  Take 1 tablet by mouth daily             bumetanide (BUMEX) 1 MG tablet  Take 1 tablet by mouth 2 times daily Additional RF per her PCP             Cyanocobalamin (VITAMIN B 12 PO)  Take 500 mcg by mouth daily             digoxin (LANOXIN) 125 MCG tablet  Take 1 tablet by mouth daily Additional RF per her PCP             Flaxseed, Linseed, (FLAX SEED OIL) 1000 MG CAPS  Take 1,000 mg by mouth daily             magnesium oxide (MAG-OX) 400 (241.3 Mg) MG TABS tablet  Take 1 tablet by mouth 2 times daily             metoprolol succinate (TOPROL XL) 25 MG extended release tablet  Take 0.5 tablets by mouth daily Additional RF per her PCP             midodrine (PROAMATINE) 10 MG tablet  Take 1 tablet by mouth 3 times daily (with meals) Additional RF per her PCP             Multiple Vitamins-Minerals (MULTIVITAMIN PO)  Take 0.5 tablets by mouth daily              Multiple Vitamins-Minerals (PRESERVISION AREDS PO)  Take 1 tablet by mouth 2 times daily              Multiple Vitamins-Minerals (PRESERVISION AREDS) CAPS  Take 1 capsule by mouth 2 times daily             Omega-3 Fatty Acids (FISH OIL) 1000 MG CAPS  Take 1,000 mg by mouth daily             potassium chloride (KLOR-CON M) 20 MEQ extended release tablet  Take 1 tablet by mouth daily Additional RF per her PCP             vitamin C (ASCORBIC ACID) 500 MG tablet  Take 500 mg by mouth daily             vitamin D 1000 units CAPS  Take 1 capsule by mouth daily             vitamin E 400 UNIT capsule  Take 400 Units by mouth daily                 35 minutes spent preparing the patient for discharge    Josue Hwang MD Cartilage Graft Text: The defect edges were debeveled with a #15 scalpel blade.  Given the location of the defect, shape of the defect, the fact the defect involved a full thickness cartilage defect a cartilage graft was deemed most appropriate.  An appropriate donor site was identified, cleansed, and anesthetized. The cartilage graft was then harvested and transferred to the recipient site, oriented appropriately and then sutured into place.  The secondary defect was then repaired using a primary closure.

## 2019-08-19 NOTE — PROGRESS NOTES
for Short term goals: 1 week  Short term goal 1: Pt will verbalize and demo all hip precautions during OT session to increase safety during ADLs. NOT MET CONSISTENTLY  Short term goal 2: Pt will safely navigate to/from bathroom with S, RW, & min vcs for walker safety to increase indep with accessing environment during ADLs. MET  Short term goal 3: Pt will tolerate dynamic standing task x 8 min with S for increased ease of sinkside grooming. MET  Short term goal 4: Complete LE dressing with SBA, LHAE, & min vcs for THR precautions to increase indep with self care. MET PREVIOUSLY  Long term goals  Time Frame for Long term goals : 2 weeks  Long term goal 1: Pt will complete ADL routine including shower t/f with Justo to increase indep with self care. NOT MET  Long term goal 2: Pt will complete light IADL task with Justo to restore PLOF with cooking and cleaning. NOT MET    Following session, patient left in safe position with all fall risk precautions in place.

## 2019-08-19 NOTE — PROGRESS NOTES
Nutrition Assessment    Type and Reason for Visit: Reassess(po/wt. check)    Nutrition Recommendations:   Continue current diet. Discontinued magic cups ( Pt. Dislikes them & declines other ONS). Reinforced Low sodium 1800ml fluid restriction diet. Pt. declines diet education. Encouraged between meal snacks as tolerated. Consider MVI. Nutrition Assessment:  Pt. severely malnourished AEB severe fat & muscle loss. At risk for further nutritional compromise r/t underlying severe malnutrition, advanced age, pt. declines ONS, CHF, increased needs for healing s/p hip surgery  Recommendations as per above. Labs: (8/19) BUN 36, Glucose 134, Hemoglobin 10.8. Malnutrition Assessment:  · Malnutrition Status: Meets the criteria for severe malnutrition  · Context: Chronic illness  · Findings of the 6 clinical characteristics of malnutrition (Minimum of 2 out of 6 clinical characteristics is required to make the diagnosis of moderate or severe Protein Calorie Malnutrition based on AND/ASPEN Guidelines):  1. Energy Intake-(%), (x2 days)    2. Weight Loss-5% loss or greater, (12days (less edema, varied intake, CHF pt))  3. Fat Loss-Severe subcutaneous fat loss, Orbital, Fat overlying the ribs  4. Muscle Loss-Severe muscle mass loss, Temples (temporalis muscle), Clavicles (pectoralis and deltoids)    Nutrition Risk Level: Moderate    Nutrient Needs:  · Estimated Daily Total Kcal: 2591-0604 kcal/day (25-30 kcal/kg -65.5 kg on 8/8)  · Estimated Daily Protein (g): 79+ g/day (1.2+ g/kg - 65.5 kg on 8/8)): Nutrition Diagnosis:   · Problem: Severe malnutrition, In context of chronic illness  · Etiology: related to Insufficient energy/nutrient consumption     Signs and symptoms:  as evidenced by Severe muscle loss, Severe loss of subcutaneous fat    Objective Information:  · Nutrition-Focused Physical Findings: advanced age, thin, Pt. seen, mentions good appetite, likes small portions. Colostomy.  Dislikes

## 2019-08-19 NOTE — PLAN OF CARE
8/19/19, 11:53 AM    Discharge plan discussed by / . Discharge plan reviewed with patient/ family. Discharging home with [facility], PT,OT,RN,Aide  Patient/ family verbalize understanding of discharge plan and are in agreement with plan. Understanding was demonstrated using the teach back method.        IMM Letter  Notice of Medicare Non-Coverage Letter date given[de-identified] 08/15/19  Notice of Medicare Non-Coverage Time Given: 5068  Notice of Medicare Non-Coverage Letter Given By: Anabelle Smith

## 2019-08-19 NOTE — PLAN OF CARE
Problem: Nutrition  Goal: Optimal nutrition therapy  8/19/2019 1423 by Deisy Garcia RD, LD  Reactivated   Nutrition Problem: Severe malnutrition, In context of chronic illness  Intervention: Food and/or Nutrient Delivery: Continue current diet, Discontinue ONS  Nutritional Goals: Pt will consume 75% or more of meals during LOS

## 2019-08-19 NOTE — PROGRESS NOTES
[address] [address]  INPATIENT PHYSICAL THERAPY  Discharge Note  [address] [id number]     Time In: 1130  Time Out: 1209  Timed Code Treatment Minutes: 44 Minutes  Minutes: 39          Date: 2019  Patient Name: Lyric Coelho,  Gender:  female        MRN: [id number]  : 1924  (89 y.o.)     Referring Practitioner: JIMENA Tran MD.  Dr. Amie Stone, attending  Diagnosis: Lt femoral neck fx, s/p repair  Additional Pertinent Hx: Admitted [facility] ED due to Lt leg weakness and pain. .  Edema BLEs. x-ray noted Lt femoral neck fracture. Pt underwent HR 2019     Prior Level of Function:  Lives With: Alone  Type of Home: House  Home Layout: One level, Laundry in basement  Home Access: Stairs to enter with rails  Entrance Stairs - Number of Steps: 2  Entrance Stairs - Rails: Left(Dtr in law stated cristiana rails to be added prior to discharge)  Home Equipment: (None used PTA)    Restrictions/Precautions:  Restrictions/Precautions: Weight Bearing, Fall Risk, General Precautions, Surgical Protocols  Left Lower Extremity Weight Bearing: Weight Bearing As Tolerated  Position Activity Restriction  Hip Precautions: No hip flexion > 90 degrees, No hip external rotation, No hip internal rotation, No ADduction  Other position/activity restrictions: THR precautions    SUBJECTIVE: Pt sitting up in recliner and agrees to therapy. PAIN: denies      OBJECTIVE:  Bed Mobility:  Supine to Sit: Modified Independent  Sit to Supine: Modified Independent     Transfers:  Sit to Stand: Modified Independent  Stand to Sit:Modified Independent    Ambulation:  Supervision, to Mod I for at least 50 ft with RW  Distance: 200 ft and 100 ft  Surface: Level Tile  Device:Rolling Walker  Gait Deviations: Forward Flexed Posture, Decreased Gait Speed and generally steady with use of RW. No LOB    Exercise:  Patient was guided in 1 set(s) 10-12 reps of exercise to both lower extremities.   Ankle pumps, Glut sets, Quad

## 2019-08-27 PROBLEM — R77.8 ELEVATED TROPONIN: Status: RESOLVED | Noted: 2019-07-28 | Resolved: 2019-08-27

## 2019-08-27 PROBLEM — N17.9 AKI (ACUTE KIDNEY INJURY) (HCC): Status: RESOLVED | Noted: 2019-07-28 | Resolved: 2019-08-27

## 2019-08-28 ENCOUNTER — HOSPITAL ENCOUNTER (OUTPATIENT)
Age: 84
Setting detail: SPECIMEN
Discharge: HOME | End: 2019-08-28
Payer: MEDICARE

## 2019-08-28 LAB
ANION GAP SERPL CALCULATED.3IONS-SCNC: 15 MEQ/L (ref 8–16)
ANISOCYTOSIS: PRESENT
BASOPHILS # BLD: 0.6 %
BASOPHILS ABSOLUTE: 0 THOU/MM3 (ref 0–0.1)
BUN BLDV-MCNC: 37 MG/DL (ref 7–22)
CALCIUM SERPL-MCNC: 9.8 MG/DL (ref 8.5–10.5)
CHLORIDE BLD-SCNC: 100 MEQ/L (ref 98–111)
CO2: 30 MEQ/L (ref 23–33)
CREAT SERPL-MCNC: 1 MG/DL (ref 0.4–1.2)
CREATININE URINE: 112.1 MG/DL
DIGOXIN LEVEL: 0.9 NG/ML (ref 0.5–2)
EOSINOPHIL # BLD: 2.5 %
EOSINOPHILS ABSOLUTE: 0.2 THOU/MM3 (ref 0–0.4)
ERYTHROCYTE [DISTWIDTH] IN BLOOD BY AUTOMATED COUNT: 19.4 % (ref 11.5–14.5)
ERYTHROCYTE [DISTWIDTH] IN BLOOD BY AUTOMATED COUNT: 69.5 FL (ref 35–45)
GFR SERPL CREATININE-BSD FRML MDRD: 51 ML/MIN/1.73M2
GLUCOSE BLD-MCNC: 81 MG/DL (ref 70–108)
HCT VFR BLD CALC: 39 % (ref 37–47)
HEMOGLOBIN: 11.7 GM/DL (ref 12–16)
IMMATURE GRANS (ABS): 0.02 THOU/MM3 (ref 0–0.07)
IMMATURE GRANULOCYTES: 0 %
LYMPHOCYTES # BLD: 19.3 %
LYMPHOCYTES ABSOLUTE: 1.3 THOU/MM3 (ref 1–4.8)
MCH RBC QN AUTO: 29.5 PG (ref 26–33)
MCHC RBC AUTO-ENTMCNC: 30 GM/DL (ref 32.2–35.5)
MCV RBC AUTO: 98.5 FL (ref 81–99)
MONOCYTES # BLD: 7.2 %
MONOCYTES ABSOLUTE: 0.5 THOU/MM3 (ref 0.4–1.3)
NUCLEATED RED BLOOD CELLS: 0 /100 WBC
PLATELET # BLD: 151 THOU/MM3 (ref 130–400)
PMV BLD AUTO: 11.1 FL (ref 9.4–12.4)
POTASSIUM SERPL-SCNC: 3.7 MEQ/L (ref 3.5–5.2)
PROTEIN, URINE: 28.3 MG/DL
RBC # BLD: 3.96 MILL/MM3 (ref 4.2–5.4)
SEG NEUTROPHILS: 70.1 %
SEGMENTED NEUTROPHILS ABSOLUTE COUNT: 4.7 THOU/MM3 (ref 1.8–7.7)
SODIUM BLD-SCNC: 145 MEQ/L (ref 135–145)
WBC # BLD: 6.7 THOU/MM3 (ref 4.8–10.8)

## 2019-08-28 PROCEDURE — 80162 ASSAY OF DIGOXIN TOTAL: CPT

## 2019-08-28 PROCEDURE — 85025 COMPLETE CBC W/AUTO DIFF WBC: CPT

## 2019-08-28 PROCEDURE — 80048 BASIC METABOLIC PNL TOTAL CA: CPT

## 2019-08-28 PROCEDURE — 84156 ASSAY OF PROTEIN URINE: CPT

## 2019-08-28 PROCEDURE — 82570 ASSAY OF URINE CREATININE: CPT

## 2019-09-03 ENCOUNTER — OFFICE VISIT (OUTPATIENT)
Dept: NEPHROLOGY | Age: 84
End: 2019-09-03
Payer: MEDICARE

## 2019-09-03 VITALS
WEIGHT: 114.8 LBS | SYSTOLIC BLOOD PRESSURE: 92 MMHG | HEART RATE: 75 BPM | BODY MASS INDEX: 19.1 KG/M2 | OXYGEN SATURATION: 97 % | DIASTOLIC BLOOD PRESSURE: 57 MMHG

## 2019-09-03 DIAGNOSIS — N18.30 CKD (CHRONIC KIDNEY DISEASE), STAGE III (HCC): Primary | ICD-10-CM

## 2019-09-03 PROCEDURE — 99213 OFFICE O/P EST LOW 20 MIN: CPT | Performed by: INTERNAL MEDICINE

## 2019-09-03 PROCEDURE — 4040F PNEUMOC VAC/ADMIN/RCVD: CPT | Performed by: INTERNAL MEDICINE

## 2019-09-03 PROCEDURE — 1123F ACP DISCUSS/DSCN MKR DOCD: CPT | Performed by: INTERNAL MEDICINE

## 2019-09-03 PROCEDURE — G8420 CALC BMI NORM PARAMETERS: HCPCS | Performed by: INTERNAL MEDICINE

## 2019-09-03 PROCEDURE — 1036F TOBACCO NON-USER: CPT | Performed by: INTERNAL MEDICINE

## 2019-09-03 PROCEDURE — 1111F DSCHRG MED/CURRENT MED MERGE: CPT | Performed by: INTERNAL MEDICINE

## 2019-09-03 PROCEDURE — 1090F PRES/ABSN URINE INCON ASSESS: CPT | Performed by: INTERNAL MEDICINE

## 2019-09-03 PROCEDURE — G8427 DOCREV CUR MEDS BY ELIG CLIN: HCPCS | Performed by: INTERNAL MEDICINE

## 2019-09-03 NOTE — PROGRESS NOTES
Family in with patient.  Hard of hearing
 08/19/2019    CL 99 08/15/2019    CO2 30 08/28/2019    CO2 29 08/19/2019    CO2 32 08/15/2019    BUN 37 (H) 08/28/2019    BUN 36 (H) 08/19/2019    BUN 40 (H) 08/15/2019    CREATININE 1.0 08/28/2019    CREATININE 0.9 08/19/2019    CREATININE 0.8 08/15/2019    GLUCOSE 81 08/28/2019    GLUCOSE 134 (H) 08/19/2019    GLUCOSE 94 08/15/2019      Hepatic:   Lab Results   Component Value Date    AST 24 08/03/2019    AST 34 08/02/2019    AST 41 (H) 08/01/2019    ALT 16 08/03/2019    ALT 22 08/02/2019    ALT 34 08/01/2019    BILITOT 1.3 (H) 08/03/2019    BILITOT 1.4 (H) 08/02/2019    BILITOT 0.9 08/01/2019    ALKPHOS 73 08/03/2019    ALKPHOS 83 08/02/2019    ALKPHOS 80 08/01/2019     BNP: No results found for: BNP  Lipids:   Lab Results   Component Value Date    CHOL 215 (H) 08/10/2012    HDL 47 08/10/2012     INR:   Lab Results   Component Value Date    INR 1.27 (H) 08/01/2019    INR 1.54 (H) 07/28/2019     URINE:   Lab Results   Component Value Date    NAUR < 20 07/31/2019    PROTUR 28.3 08/28/2019     Lab Results   Component Value Date    NITRU NEGATIVE 08/11/2019    COLORU YELLOW 08/11/2019    PHUR 7.0 08/11/2019    LABCAST NONE SEEN 08/02/2019    LABCAST NONE SEEN 08/02/2019    WBCUA 0-2 08/02/2019    RBCUA 10-15 08/02/2019    MUCUS NONE SEEN 07/31/2019    YEAST NONE SEEN 08/02/2019    BACTERIA NONE 08/02/2019    SPECGRAV 1.010 08/02/2019    LEUKOCYTESUR NEGATIVE 08/11/2019    UROBILINOGEN 0.2 08/11/2019    BILIRUBINUR NEGATIVE 08/11/2019    BLOODU NEGATIVE 08/11/2019    GLUCOSEU NEGATIVE 08/11/2019    KETUA NEGATIVE 08/11/2019    AMORPHOUS DEBRIS 07/31/2019      Microalbumen/Creatinine ratio:  No components found for: RUCREAT        Impression/Plan:   1. CKD IIIa - stable. S/p MAG in hospital from hypotension, cardiorenal syndrome. 2. Volume overload - much better on Bumex 1 mg BID, will continue  3. Hypokalemia - on KCl 20 meq daily, K 3.7 - advised to increase K in diet  4.  Hypotension - continue with

## 2019-09-10 ENCOUNTER — OFFICE VISIT (OUTPATIENT)
Dept: CARDIOLOGY CLINIC | Age: 84
End: 2019-09-10
Payer: MEDICARE

## 2019-09-10 VITALS
SYSTOLIC BLOOD PRESSURE: 118 MMHG | BODY MASS INDEX: 18 KG/M2 | WEIGHT: 112 LBS | HEART RATE: 81 BPM | HEIGHT: 66 IN | DIASTOLIC BLOOD PRESSURE: 72 MMHG

## 2019-09-10 DIAGNOSIS — I48.21 PERMANENT ATRIAL FIBRILLATION (HCC): ICD-10-CM

## 2019-09-10 DIAGNOSIS — I34.2 NON-RHEUMATIC MITRAL VALVE STENOSIS: ICD-10-CM

## 2019-09-10 DIAGNOSIS — I50.42 CHRONIC COMBINED SYSTOLIC AND DIASTOLIC CONGESTIVE HEART FAILURE (HCC): Primary | ICD-10-CM

## 2019-09-10 DIAGNOSIS — I34.0 SEVERE MITRAL REGURGITATION BY PRIOR ECHOCARDIOGRAM: ICD-10-CM

## 2019-09-10 DIAGNOSIS — I42.0 DILATED CARDIOMYOPATHY (HCC): ICD-10-CM

## 2019-09-10 PROCEDURE — 4040F PNEUMOC VAC/ADMIN/RCVD: CPT | Performed by: INTERNAL MEDICINE

## 2019-09-10 PROCEDURE — G8419 CALC BMI OUT NRM PARAM NOF/U: HCPCS | Performed by: INTERNAL MEDICINE

## 2019-09-10 PROCEDURE — 1123F ACP DISCUSS/DSCN MKR DOCD: CPT | Performed by: INTERNAL MEDICINE

## 2019-09-10 PROCEDURE — G8427 DOCREV CUR MEDS BY ELIG CLIN: HCPCS | Performed by: INTERNAL MEDICINE

## 2019-09-10 PROCEDURE — 1111F DSCHRG MED/CURRENT MED MERGE: CPT | Performed by: INTERNAL MEDICINE

## 2019-09-10 PROCEDURE — 93000 ELECTROCARDIOGRAM COMPLETE: CPT | Performed by: INTERNAL MEDICINE

## 2019-09-10 PROCEDURE — 1036F TOBACCO NON-USER: CPT | Performed by: INTERNAL MEDICINE

## 2019-09-10 PROCEDURE — 99215 OFFICE O/P EST HI 40 MIN: CPT | Performed by: INTERNAL MEDICINE

## 2019-09-10 PROCEDURE — 1090F PRES/ABSN URINE INCON ASSESS: CPT | Performed by: INTERNAL MEDICINE

## 2019-09-10 NOTE — PROGRESS NOTES
pericardial effusion with no evidence of   hemodynamic compromise.   There is moderate left sided pleural effusion.      Signature      ----------------------------------------------------------------   Electronically signed by Jose Ortiz MD (Interpreting   physician) on 07/29/2019 at 06:42 PM    ekg  9/10/19  Atrial fibrillation  - occasional ectopic ventricular beat     -Lateral Q-waves may be septal -consider old lateral infarct  -Right axis -may be secondary to infarct. -ST depression   +   Nonspecific T-abnormality  -Seen with left ventricular hypertrophy (strain) or digitalis effect -consider ischemia. ABNORMAL       Assessment   Diagnosis Orders   1. Chronic combined systolic and diastolic congestive heart failure (HCC)  Basic Metabolic Panel    Magnesium   2. Dilated cardiomyopathy (HCC)  EKG 12 Lead    Basic Metabolic Panel    Magnesium   3. Permanent atrial fibrillation (HCC)  Basic Metabolic Panel    Magnesium   4. Severe mitral regurgitation by prior echocardiogram  Basic Metabolic Panel    Magnesium   5. Non-rheumatic mitral valve stenosis  Basic Metabolic Panel    Magnesium      Surgery Date:  7/30/2019     Pre-operative Diagnosis: Closed displaced femoral neck fx Left       Post-operative Diagnosis: Same     Procedure: open tx femoral neck fracture with a prosthesis cemented     Recent admission DX   ASSESSMENT:  1. Left hip fracture, closed. 2.  Acute congestive heart failure, diastolic versus systolic needs to  be determined. 3.  Pansystolic murmur of significant degree in the mitral area. 4.  Atria fibrillation with controlled ventricular rate. 5.  Bilateral leg edema of +3.  6.  Low normal blood pressure. 7.  Acute kidney injury. 8.  Cardiorenal syndrome. Plan   Continue the current treatment and with constant vigilance to changes in symptoms and also any potential side effects.   Return for care or seek medical attention immediately if symptoms got worse and/or develop new

## 2019-09-26 ENCOUNTER — HOSPITAL ENCOUNTER (OUTPATIENT)
Age: 84
Discharge: HOME | End: 2019-09-26
Payer: MEDICARE

## 2019-09-26 DIAGNOSIS — I48.21 PERMANENT ATRIAL FIBRILLATION (HCC): ICD-10-CM

## 2019-09-26 DIAGNOSIS — I34.0 SEVERE MITRAL REGURGITATION BY PRIOR ECHOCARDIOGRAM: ICD-10-CM

## 2019-09-26 DIAGNOSIS — I50.42 CHRONIC COMBINED SYSTOLIC AND DIASTOLIC CONGESTIVE HEART FAILURE (HCC): ICD-10-CM

## 2019-09-26 DIAGNOSIS — I42.0 DILATED CARDIOMYOPATHY (HCC): ICD-10-CM

## 2019-09-26 DIAGNOSIS — I34.2 NON-RHEUMATIC MITRAL VALVE STENOSIS: ICD-10-CM

## 2019-09-26 LAB
ANION GAP SERPL CALCULATED.3IONS-SCNC: 13 MEQ/L (ref 8–16)
BUN BLDV-MCNC: 30 MG/DL (ref 7–22)
CALCIUM SERPL-MCNC: 9.9 MG/DL (ref 8.5–10.5)
CHLORIDE BLD-SCNC: 96 MEQ/L (ref 98–111)
CO2: 33 MEQ/L (ref 23–33)
CREAT SERPL-MCNC: 1.1 MG/DL (ref 0.4–1.2)
GFR SERPL CREATININE-BSD FRML MDRD: 46 ML/MIN/1.73M2
GLUCOSE BLD-MCNC: 114 MG/DL (ref 70–108)
MAGNESIUM: 2.2 MG/DL (ref 1.6–2.4)
POTASSIUM SERPL-SCNC: 4.7 MEQ/L (ref 3.5–5.2)
SODIUM BLD-SCNC: 142 MEQ/L (ref 135–145)

## 2019-09-26 PROCEDURE — 36415 COLL VENOUS BLD VENIPUNCTURE: CPT

## 2019-09-26 PROCEDURE — 80048 BASIC METABOLIC PNL TOTAL CA: CPT

## 2019-09-26 PROCEDURE — 83735 ASSAY OF MAGNESIUM: CPT

## 2019-10-08 ENCOUNTER — OFFICE VISIT (OUTPATIENT)
Dept: CARDIOLOGY CLINIC | Age: 84
End: 2019-10-08
Payer: MEDICARE

## 2019-10-08 VITALS
DIASTOLIC BLOOD PRESSURE: 78 MMHG | WEIGHT: 107.6 LBS | BODY MASS INDEX: 17.29 KG/M2 | SYSTOLIC BLOOD PRESSURE: 130 MMHG | HEIGHT: 66 IN | HEART RATE: 54 BPM

## 2019-10-08 DIAGNOSIS — I50.42 CHRONIC COMBINED SYSTOLIC AND DIASTOLIC CONGESTIVE HEART FAILURE (HCC): Primary | ICD-10-CM

## 2019-10-08 DIAGNOSIS — I48.21 PERMANENT ATRIAL FIBRILLATION (HCC): ICD-10-CM

## 2019-10-08 DIAGNOSIS — I42.0 DILATED CARDIOMYOPATHY (HCC): ICD-10-CM

## 2019-10-08 DIAGNOSIS — I34.0 SEVERE MITRAL REGURGITATION BY PRIOR ECHOCARDIOGRAM: ICD-10-CM

## 2019-10-08 PROCEDURE — 4040F PNEUMOC VAC/ADMIN/RCVD: CPT | Performed by: INTERNAL MEDICINE

## 2019-10-08 PROCEDURE — G8484 FLU IMMUNIZE NO ADMIN: HCPCS | Performed by: INTERNAL MEDICINE

## 2019-10-08 PROCEDURE — 1090F PRES/ABSN URINE INCON ASSESS: CPT | Performed by: INTERNAL MEDICINE

## 2019-10-08 PROCEDURE — G8419 CALC BMI OUT NRM PARAM NOF/U: HCPCS | Performed by: INTERNAL MEDICINE

## 2019-10-08 PROCEDURE — G8427 DOCREV CUR MEDS BY ELIG CLIN: HCPCS | Performed by: INTERNAL MEDICINE

## 2019-10-08 PROCEDURE — 99214 OFFICE O/P EST MOD 30 MIN: CPT | Performed by: INTERNAL MEDICINE

## 2019-10-08 PROCEDURE — 1036F TOBACCO NON-USER: CPT | Performed by: INTERNAL MEDICINE

## 2019-10-08 PROCEDURE — 1123F ACP DISCUSS/DSCN MKR DOCD: CPT | Performed by: INTERNAL MEDICINE

## 2019-10-08 RX ORDER — POTASSIUM CHLORIDE 20 MEQ/1
10 TABLET, EXTENDED RELEASE ORAL DAILY
Qty: 30 TABLET | Refills: 3 | Status: SHIPPED | OUTPATIENT
Start: 2019-10-08 | End: 2019-10-10 | Stop reason: SDUPTHER

## 2019-10-08 RX ORDER — BUMETANIDE 1 MG/1
0.5 TABLET ORAL 2 TIMES DAILY
Qty: 30 TABLET | Refills: 5 | Status: SHIPPED | OUTPATIENT
Start: 2019-10-08 | End: 2019-10-10 | Stop reason: SDUPTHER

## 2019-10-10 RX ORDER — BUMETANIDE 1 MG/1
0.5 TABLET ORAL 2 TIMES DAILY
Qty: 30 TABLET | Refills: 3 | Status: SHIPPED | OUTPATIENT
Start: 2019-10-10 | End: 2020-01-01 | Stop reason: SDUPTHER

## 2019-10-10 RX ORDER — POTASSIUM CHLORIDE 20 MEQ/1
10 TABLET, EXTENDED RELEASE ORAL DAILY
Qty: 30 TABLET | Refills: 3 | Status: SHIPPED | OUTPATIENT
Start: 2019-10-10 | End: 2019-10-16 | Stop reason: SDUPTHER

## 2019-10-16 ENCOUNTER — TELEPHONE (OUTPATIENT)
Dept: CARDIOLOGY CLINIC | Age: 84
End: 2019-10-16

## 2019-10-16 RX ORDER — POTASSIUM CHLORIDE 20 MEQ/1
10 TABLET, EXTENDED RELEASE ORAL DAILY
Qty: 45 TABLET | Refills: 0 | Status: SHIPPED | OUTPATIENT
Start: 2019-10-16 | End: 2020-01-01 | Stop reason: SDUPTHER

## 2019-10-16 RX ORDER — MIDODRINE HYDROCHLORIDE 10 MG/1
10 TABLET ORAL
Qty: 90 TABLET | Refills: 2 | Status: SHIPPED | OUTPATIENT
Start: 2019-10-16 | End: 2019-01-01 | Stop reason: SDUPTHER

## 2019-10-16 RX ORDER — METOPROLOL SUCCINATE 25 MG/1
12.5 TABLET, EXTENDED RELEASE ORAL DAILY
Qty: 45 TABLET | Refills: 1 | Status: SHIPPED | OUTPATIENT
Start: 2019-10-16 | End: 2020-01-01

## 2019-10-24 RX ORDER — DIGOXIN 125 MCG
125 TABLET ORAL DAILY
Qty: 30 TABLET | Refills: 3 | Status: SHIPPED | OUTPATIENT
Start: 2019-10-24 | End: 2020-01-01

## 2019-11-14 ENCOUNTER — HOSPITAL ENCOUNTER (OUTPATIENT)
Age: 84
Discharge: HOME | End: 2019-11-14
Payer: MEDICARE

## 2019-11-14 DIAGNOSIS — I48.21 PERMANENT ATRIAL FIBRILLATION (HCC): ICD-10-CM

## 2019-11-14 DIAGNOSIS — I34.0 SEVERE MITRAL REGURGITATION BY PRIOR ECHOCARDIOGRAM: ICD-10-CM

## 2019-11-14 DIAGNOSIS — I50.42 CHRONIC COMBINED SYSTOLIC AND DIASTOLIC CONGESTIVE HEART FAILURE (HCC): ICD-10-CM

## 2019-11-14 DIAGNOSIS — I42.0 DILATED CARDIOMYOPATHY (HCC): ICD-10-CM

## 2019-11-14 LAB
ANION GAP SERPL CALCULATED.3IONS-SCNC: 15 MEQ/L (ref 8–16)
BUN BLDV-MCNC: 25 MG/DL (ref 7–22)
CALCIUM SERPL-MCNC: 9.7 MG/DL (ref 8.5–10.5)
CHLORIDE BLD-SCNC: 96 MEQ/L (ref 98–111)
CO2: 29 MEQ/L (ref 23–33)
CREAT SERPL-MCNC: 0.9 MG/DL (ref 0.4–1.2)
GFR SERPL CREATININE-BSD FRML MDRD: 58 ML/MIN/1.73M2
GLUCOSE BLD-MCNC: 90 MG/DL (ref 70–108)
MAGNESIUM: 2.4 MG/DL (ref 1.6–2.4)
POTASSIUM SERPL-SCNC: 4.6 MEQ/L (ref 3.5–5.2)
SODIUM BLD-SCNC: 140 MEQ/L (ref 135–145)

## 2019-11-14 PROCEDURE — 83735 ASSAY OF MAGNESIUM: CPT

## 2019-11-14 PROCEDURE — 80048 BASIC METABOLIC PNL TOTAL CA: CPT

## 2019-11-14 PROCEDURE — 36415 COLL VENOUS BLD VENIPUNCTURE: CPT

## 2019-12-10 ENCOUNTER — OFFICE VISIT (OUTPATIENT)
Dept: CARDIOLOGY CLINIC | Age: 84
End: 2019-12-10
Payer: MEDICARE

## 2019-12-10 VITALS
BODY MASS INDEX: 16.23 KG/M2 | SYSTOLIC BLOOD PRESSURE: 138 MMHG | HEART RATE: 77 BPM | HEIGHT: 66 IN | WEIGHT: 101 LBS | DIASTOLIC BLOOD PRESSURE: 79 MMHG

## 2019-12-10 DIAGNOSIS — I50.42 CHRONIC COMBINED SYSTOLIC AND DIASTOLIC CONGESTIVE HEART FAILURE (HCC): Primary | ICD-10-CM

## 2019-12-10 DIAGNOSIS — I48.21 PERMANENT ATRIAL FIBRILLATION (HCC): ICD-10-CM

## 2019-12-10 DIAGNOSIS — I42.0 DILATED CARDIOMYOPATHY (HCC): ICD-10-CM

## 2019-12-10 DIAGNOSIS — I34.0 SEVERE MITRAL REGURGITATION BY PRIOR ECHOCARDIOGRAM: ICD-10-CM

## 2019-12-10 PROCEDURE — G8419 CALC BMI OUT NRM PARAM NOF/U: HCPCS | Performed by: INTERNAL MEDICINE

## 2019-12-10 PROCEDURE — G8484 FLU IMMUNIZE NO ADMIN: HCPCS | Performed by: INTERNAL MEDICINE

## 2019-12-10 PROCEDURE — 1123F ACP DISCUSS/DSCN MKR DOCD: CPT | Performed by: INTERNAL MEDICINE

## 2019-12-10 PROCEDURE — 4040F PNEUMOC VAC/ADMIN/RCVD: CPT | Performed by: INTERNAL MEDICINE

## 2019-12-10 PROCEDURE — 1090F PRES/ABSN URINE INCON ASSESS: CPT | Performed by: INTERNAL MEDICINE

## 2019-12-10 PROCEDURE — 99214 OFFICE O/P EST MOD 30 MIN: CPT | Performed by: INTERNAL MEDICINE

## 2019-12-10 PROCEDURE — 1036F TOBACCO NON-USER: CPT | Performed by: INTERNAL MEDICINE

## 2019-12-10 PROCEDURE — G8427 DOCREV CUR MEDS BY ELIG CLIN: HCPCS | Performed by: INTERNAL MEDICINE

## 2020-01-01 ENCOUNTER — OFFICE VISIT (OUTPATIENT)
Dept: CARDIOLOGY CLINIC | Age: 85
End: 2020-01-01
Payer: MEDICARE

## 2020-01-01 ENCOUNTER — APPOINTMENT (OUTPATIENT)
Dept: GENERAL RADIOLOGY | Age: 85
DRG: 564 | End: 2020-01-01
Payer: MEDICARE

## 2020-01-01 ENCOUNTER — APPOINTMENT (OUTPATIENT)
Dept: CT IMAGING | Age: 85
DRG: 564 | End: 2020-01-01
Payer: MEDICARE

## 2020-01-01 ENCOUNTER — OFFICE VISIT (OUTPATIENT)
Dept: NEPHROLOGY | Age: 85
End: 2020-01-01
Payer: MEDICARE

## 2020-01-01 ENCOUNTER — HOSPITAL ENCOUNTER (INPATIENT)
Age: 85
LOS: 2 days | DRG: 564 | End: 2020-12-11
Attending: EMERGENCY MEDICINE | Admitting: FAMILY MEDICINE
Payer: MEDICARE

## 2020-01-01 VITALS
OXYGEN SATURATION: 92 % | SYSTOLIC BLOOD PRESSURE: 103 MMHG | BODY MASS INDEX: 18.95 KG/M2 | HEART RATE: 92 BPM | DIASTOLIC BLOOD PRESSURE: 58 MMHG | RESPIRATION RATE: 30 BRPM | WEIGHT: 121 LBS

## 2020-01-01 VITALS
SYSTOLIC BLOOD PRESSURE: 110 MMHG | DIASTOLIC BLOOD PRESSURE: 70 MMHG | BODY MASS INDEX: 15.7 KG/M2 | HEIGHT: 67 IN | WEIGHT: 100 LBS | HEART RATE: 58 BPM

## 2020-01-01 VITALS
SYSTOLIC BLOOD PRESSURE: 124 MMHG | HEART RATE: 71 BPM | BODY MASS INDEX: 17.11 KG/M2 | WEIGHT: 106 LBS | OXYGEN SATURATION: 98 % | DIASTOLIC BLOOD PRESSURE: 67 MMHG

## 2020-01-01 LAB
ALBUMIN SERPL-MCNC: 3.2 G/DL (ref 3.5–5.1)
ALP BLD-CCNC: 208 U/L (ref 38–126)
ALT SERPL-CCNC: 47 U/L (ref 11–66)
ANION GAP SERPL CALCULATED.3IONS-SCNC: 19 MEQ/L (ref 8–16)
ANION GAP SERPL CALCULATED.3IONS-SCNC: 21 MEQ/L (ref 8–16)
ANION GAP SERPL CALCULATED.3IONS-SCNC: 24 MEQ/L (ref 8–16)
AST SERPL-CCNC: 80 U/L (ref 5–40)
BACTERIA: ABNORMAL /HPF
BASE EXCESS MIXED: 0.5 MMOL/L (ref -2–3)
BASOPHILS # BLD: 0.1 %
BASOPHILS ABSOLUTE: 0 THOU/MM3 (ref 0–0.1)
BETA-HYDROXYBUTYRATE: 5.09 MG/DL (ref 0.2–2.81)
BILIRUB SERPL-MCNC: 1.9 MG/DL (ref 0.3–1.2)
BILIRUBIN URINE: ABNORMAL
BLOOD, URINE: NEGATIVE
BUN BLDV-MCNC: 80 MG/DL (ref 7–22)
BUN BLDV-MCNC: 85 MG/DL (ref 7–22)
BUN BLDV-MCNC: 87 MG/DL (ref 7–22)
C-REACTIVE PROTEIN: 8.49 MG/DL (ref 0–1)
CALCIUM SERPL-MCNC: 8.1 MG/DL (ref 8.5–10.5)
CALCIUM SERPL-MCNC: 8.8 MG/DL (ref 8.5–10.5)
CALCIUM SERPL-MCNC: 8.8 MG/DL (ref 8.5–10.5)
CASTS 2: ABNORMAL /LPF
CASTS UA: ABNORMAL /LPF
CHARACTER, URINE: ABNORMAL
CHLORIDE BLD-SCNC: 100 MEQ/L (ref 98–111)
CHLORIDE BLD-SCNC: 106 MEQ/L (ref 98–111)
CHLORIDE BLD-SCNC: 96 MEQ/L (ref 98–111)
CO2: 17 MEQ/L (ref 23–33)
CO2: 19 MEQ/L (ref 23–33)
CO2: 22 MEQ/L (ref 23–33)
COLLECTED BY:: ABNORMAL
COLOR: ABNORMAL
CREAT SERPL-MCNC: 1.6 MG/DL (ref 0.4–1.2)
CREAT SERPL-MCNC: 1.9 MG/DL (ref 0.4–1.2)
CREAT SERPL-MCNC: 2 MG/DL (ref 0.4–1.2)
CRYSTALS, UA: ABNORMAL
DEVICE: ABNORMAL
EOSINOPHIL # BLD: 0 %
EOSINOPHILS ABSOLUTE: 0 THOU/MM3 (ref 0–0.4)
EPITHELIAL CELLS, UA: ABNORMAL /HPF
ERYTHROCYTE [DISTWIDTH] IN BLOOD BY AUTOMATED COUNT: 15.8 % (ref 11.5–14.5)
ERYTHROCYTE [DISTWIDTH] IN BLOOD BY AUTOMATED COUNT: 59.6 FL (ref 35–45)
ETHYL ALCOHOL, SERUM: < 0.01 %
FIO2, MIXED VENOUS: 2
GFR SERPL CREATININE-BSD FRML MDRD: 23 ML/MIN/1.73M2
GFR SERPL CREATININE-BSD FRML MDRD: 24 ML/MIN/1.73M2
GFR SERPL CREATININE-BSD FRML MDRD: 30 ML/MIN/1.73M2
GLUCOSE BLD-MCNC: 102 MG/DL (ref 70–108)
GLUCOSE BLD-MCNC: 102 MG/DL (ref 70–108)
GLUCOSE BLD-MCNC: 113 MG/DL (ref 70–108)
GLUCOSE BLD-MCNC: 130 MG/DL (ref 70–108)
GLUCOSE BLD-MCNC: 25 MG/DL (ref 70–108)
GLUCOSE BLD-MCNC: 26 MG/DL (ref 70–108)
GLUCOSE BLD-MCNC: 306 MG/DL (ref 70–108)
GLUCOSE BLD-MCNC: 60 MG/DL (ref 70–108)
GLUCOSE BLD-MCNC: 73 MG/DL (ref 70–108)
GLUCOSE BLD-MCNC: 75 MG/DL (ref 70–108)
GLUCOSE BLD-MCNC: 76 MG/DL (ref 70–108)
GLUCOSE URINE: NEGATIVE MG/DL
GLUCOSE, WHOLE BLOOD: 92 MG/DL (ref 70–108)
HCO3, MIXED: 25 MMOL/L (ref 23–28)
HCT VFR BLD CALC: 41 % (ref 37–47)
HEMOGLOBIN: 12.7 GM/DL (ref 12–16)
ICTOTEST: NEGATIVE
IMMATURE GRANS (ABS): 0.04 THOU/MM3 (ref 0–0.07)
IMMATURE GRANULOCYTES: 0.4 %
KETONES, URINE: ABNORMAL
LEUKOCYTE ESTERASE, URINE: ABNORMAL
LYMPHOCYTES # BLD: 6.9 %
LYMPHOCYTES ABSOLUTE: 0.7 THOU/MM3 (ref 1–4.8)
MAGNESIUM: 2.6 MG/DL (ref 1.6–2.4)
MCH RBC QN AUTO: 32.2 PG (ref 26–33)
MCHC RBC AUTO-ENTMCNC: 31 GM/DL (ref 32.2–35.5)
MCV RBC AUTO: 103.8 FL (ref 81–99)
MISCELLANEOUS 2: ABNORMAL
MONOCYTES # BLD: 3.4 %
MONOCYTES ABSOLUTE: 0.4 THOU/MM3 (ref 0.4–1.3)
MYOGLOBIN URINE: NEGATIVE
NITRITE, URINE: NEGATIVE
NUCLEATED RED BLOOD CELLS: 0 /100 WBC
O2 SAT, MIXED: 53 %
ORGANISM: ABNORMAL
OSMOLALITY CALCULATION: 311.2 MOSMOL/KG (ref 275–300)
PCO2, MIXED VENOUS: 40 MMHG (ref 41–51)
PH UA: 5 (ref 5–9)
PH, MIXED: 7.41 (ref 7.31–7.41)
PHOSPHORUS: 4.4 MG/DL (ref 2.4–4.7)
PLATELET # BLD: 101 THOU/MM3 (ref 130–400)
PMV BLD AUTO: 12.2 FL (ref 9.4–12.4)
PO2 MIXED: 28 MMHG (ref 25–40)
POTASSIUM SERPL-SCNC: 5 MEQ/L (ref 3.5–5.2)
POTASSIUM SERPL-SCNC: 5.2 MEQ/L (ref 3.5–5.2)
POTASSIUM SERPL-SCNC: 5.6 MEQ/L (ref 3.5–5.2)
PROTEIN UA: 100
RBC # BLD: 3.95 MILL/MM3 (ref 4.2–5.4)
RBC URINE: ABNORMAL /HPF
RENAL EPITHELIAL, UA: ABNORMAL
SALICYLATE, SERUM: < 0.3 MG/DL (ref 2–10)
SEDIMENTATION RATE, ERYTHROCYTE: 11 MM/HR (ref 0–20)
SEG NEUTROPHILS: 89.2 %
SEGMENTED NEUTROPHILS ABSOLUTE COUNT: 9.5 THOU/MM3 (ref 1.8–7.7)
SITE: ABNORMAL
SODIUM BLD-SCNC: 137 MEQ/L (ref 135–145)
SODIUM BLD-SCNC: 143 MEQ/L (ref 135–145)
SODIUM BLD-SCNC: 144 MEQ/L (ref 135–145)
SPECIFIC GRAVITY, URINE: 1.02 (ref 1–1.03)
TOTAL CK: 1139 U/L (ref 30–135)
TOTAL CK: 1342 U/L (ref 30–135)
TOTAL CK: 957 U/L (ref 30–135)
TOTAL PROTEIN: 6.3 G/DL (ref 6.1–8)
TROPONIN T: 0.08 NG/ML
TROPONIN T: 0.1 NG/ML
URINE CULTURE REFLEX: ABNORMAL
UROBILINOGEN, URINE: 1 EU/DL (ref 0–1)
WBC # BLD: 10.6 THOU/MM3 (ref 4.8–10.8)
WBC UA: ABNORMAL /HPF
YEAST: ABNORMAL

## 2020-01-01 PROCEDURE — 1123F ACP DISCUSS/DSCN MKR DOCD: CPT | Performed by: NUCLEAR MEDICINE

## 2020-01-01 PROCEDURE — G8427 DOCREV CUR MEDS BY ELIG CLIN: HCPCS | Performed by: NUCLEAR MEDICINE

## 2020-01-01 PROCEDURE — 85651 RBC SED RATE NONAUTOMATED: CPT

## 2020-01-01 PROCEDURE — 99285 EMERGENCY DEPT VISIT HI MDM: CPT

## 2020-01-01 PROCEDURE — 82010 KETONE BODYS QUAN: CPT

## 2020-01-01 PROCEDURE — 1090F PRES/ABSN URINE INCON ASSESS: CPT | Performed by: NUCLEAR MEDICINE

## 2020-01-01 PROCEDURE — 2580000003 HC RX 258: Performed by: STUDENT IN AN ORGANIZED HEALTH CARE EDUCATION/TRAINING PROGRAM

## 2020-01-01 PROCEDURE — 82550 ASSAY OF CK (CPK): CPT

## 2020-01-01 PROCEDURE — 6360000002 HC RX W HCPCS: Performed by: INTERNAL MEDICINE

## 2020-01-01 PROCEDURE — 83874 ASSAY OF MYOGLOBIN: CPT

## 2020-01-01 PROCEDURE — 99233 SBSQ HOSP IP/OBS HIGH 50: CPT | Performed by: INTERNAL MEDICINE

## 2020-01-01 PROCEDURE — 87077 CULTURE AEROBIC IDENTIFY: CPT

## 2020-01-01 PROCEDURE — 99238 HOSP IP/OBS DSCHRG MGMT 30/<: CPT | Performed by: INTERNAL MEDICINE

## 2020-01-01 PROCEDURE — 36415 COLL VENOUS BLD VENIPUNCTURE: CPT

## 2020-01-01 PROCEDURE — 70450 CT HEAD/BRAIN W/O DYE: CPT

## 2020-01-01 PROCEDURE — 99214 OFFICE O/P EST MOD 30 MIN: CPT | Performed by: NUCLEAR MEDICINE

## 2020-01-01 PROCEDURE — 99213 OFFICE O/P EST LOW 20 MIN: CPT | Performed by: INTERNAL MEDICINE

## 2020-01-01 PROCEDURE — 51701 INSERT BLADDER CATHETER: CPT

## 2020-01-01 PROCEDURE — G0480 DRUG TEST DEF 1-7 CLASSES: HCPCS

## 2020-01-01 PROCEDURE — 6360000002 HC RX W HCPCS: Performed by: STUDENT IN AN ORGANIZED HEALTH CARE EDUCATION/TRAINING PROGRAM

## 2020-01-01 PROCEDURE — 1123F ACP DISCUSS/DSCN MKR DOCD: CPT | Performed by: INTERNAL MEDICINE

## 2020-01-01 PROCEDURE — 51798 US URINE CAPACITY MEASURE: CPT

## 2020-01-01 PROCEDURE — G8484 FLU IMMUNIZE NO ADMIN: HCPCS | Performed by: INTERNAL MEDICINE

## 2020-01-01 PROCEDURE — 73560 X-RAY EXAM OF KNEE 1 OR 2: CPT

## 2020-01-01 PROCEDURE — 2060000000 HC ICU INTERMEDIATE R&B

## 2020-01-01 PROCEDURE — 84484 ASSAY OF TROPONIN QUANT: CPT

## 2020-01-01 PROCEDURE — 82948 REAGENT STRIP/BLOOD GLUCOSE: CPT

## 2020-01-01 PROCEDURE — 1036F TOBACCO NON-USER: CPT | Performed by: NUCLEAR MEDICINE

## 2020-01-01 PROCEDURE — 4040F PNEUMOC VAC/ADMIN/RCVD: CPT | Performed by: INTERNAL MEDICINE

## 2020-01-01 PROCEDURE — 1090F PRES/ABSN URINE INCON ASSESS: CPT | Performed by: INTERNAL MEDICINE

## 2020-01-01 PROCEDURE — 83735 ASSAY OF MAGNESIUM: CPT

## 2020-01-01 PROCEDURE — 80048 BASIC METABOLIC PNL TOTAL CA: CPT

## 2020-01-01 PROCEDURE — 4040F PNEUMOC VAC/ADMIN/RCVD: CPT | Performed by: NUCLEAR MEDICINE

## 2020-01-01 PROCEDURE — 96374 THER/PROPH/DIAG INJ IV PUSH: CPT

## 2020-01-01 PROCEDURE — G8419 CALC BMI OUT NRM PARAM NOF/U: HCPCS | Performed by: NUCLEAR MEDICINE

## 2020-01-01 PROCEDURE — 94761 N-INVAS EAR/PLS OXIMETRY MLT: CPT

## 2020-01-01 PROCEDURE — 86140 C-REACTIVE PROTEIN: CPT

## 2020-01-01 PROCEDURE — 82803 BLOOD GASES ANY COMBINATION: CPT

## 2020-01-01 PROCEDURE — 82693 ASSAY OF ETHYLENE GLYCOL: CPT

## 2020-01-01 PROCEDURE — 73523 X-RAY EXAM HIPS BI 5/> VIEWS: CPT

## 2020-01-01 PROCEDURE — 84100 ASSAY OF PHOSPHORUS: CPT

## 2020-01-01 PROCEDURE — 36600 WITHDRAWAL OF ARTERIAL BLOOD: CPT

## 2020-01-01 PROCEDURE — 80053 COMPREHEN METABOLIC PANEL: CPT

## 2020-01-01 PROCEDURE — 1036F TOBACCO NON-USER: CPT | Performed by: INTERNAL MEDICINE

## 2020-01-01 PROCEDURE — 85025 COMPLETE CBC W/AUTO DIFF WBC: CPT

## 2020-01-01 PROCEDURE — 2580000003 HC RX 258: Performed by: EMERGENCY MEDICINE

## 2020-01-01 PROCEDURE — 81001 URINALYSIS AUTO W/SCOPE: CPT

## 2020-01-01 PROCEDURE — G8427 DOCREV CUR MEDS BY ELIG CLIN: HCPCS | Performed by: INTERNAL MEDICINE

## 2020-01-01 PROCEDURE — 99223 1ST HOSP IP/OBS HIGH 75: CPT | Performed by: FAMILY MEDICINE

## 2020-01-01 PROCEDURE — 87086 URINE CULTURE/COLONY COUNT: CPT

## 2020-01-01 PROCEDURE — G8419 CALC BMI OUT NRM PARAM NOF/U: HCPCS | Performed by: INTERNAL MEDICINE

## 2020-01-01 PROCEDURE — 82947 ASSAY GLUCOSE BLOOD QUANT: CPT

## 2020-01-01 PROCEDURE — 87186 SC STD MICRODIL/AGAR DIL: CPT

## 2020-01-01 RX ORDER — BUMETANIDE 1 MG/1
TABLET ORAL
Qty: 45 TABLET | Refills: 3 | Status: SHIPPED | OUTPATIENT
Start: 2020-01-01

## 2020-01-01 RX ORDER — 0.9 % SODIUM CHLORIDE 0.9 %
1000 INTRAVENOUS SOLUTION INTRAVENOUS ONCE
Status: DISCONTINUED | OUTPATIENT
Start: 2020-01-01 | End: 2020-01-01

## 2020-01-01 RX ORDER — METOPROLOL SUCCINATE 25 MG/1
TABLET, EXTENDED RELEASE ORAL
Qty: 45 TABLET | Refills: 2 | Status: SHIPPED | OUTPATIENT
Start: 2020-01-01

## 2020-01-01 RX ORDER — BUMETANIDE 1 MG/1
TABLET ORAL
Qty: 45 TABLET | Refills: 1 | Status: SHIPPED | OUTPATIENT
Start: 2020-01-01 | End: 2020-01-01

## 2020-01-01 RX ORDER — 0.9 % SODIUM CHLORIDE 0.9 %
500 INTRAVENOUS SOLUTION INTRAVENOUS ONCE
Status: COMPLETED | OUTPATIENT
Start: 2020-01-01 | End: 2020-01-01

## 2020-01-01 RX ORDER — DEXTROSE AND SODIUM CHLORIDE 5; .9 G/100ML; G/100ML
INJECTION, SOLUTION INTRAVENOUS CONTINUOUS
Status: DISCONTINUED | OUTPATIENT
Start: 2020-01-01 | End: 2020-01-01 | Stop reason: HOSPADM

## 2020-01-01 RX ORDER — POTASSIUM CHLORIDE 20 MEQ/1
10 TABLET, EXTENDED RELEASE ORAL DAILY
Qty: 45 TABLET | Refills: 0 | Status: SHIPPED | OUTPATIENT
Start: 2020-01-01 | End: 2020-01-01

## 2020-01-01 RX ORDER — PROMETHAZINE HYDROCHLORIDE 25 MG/1
12.5 TABLET ORAL EVERY 6 HOURS PRN
Status: DISCONTINUED | OUTPATIENT
Start: 2020-01-01 | End: 2020-01-01 | Stop reason: HOSPADM

## 2020-01-01 RX ORDER — LORAZEPAM 2 MG/ML
1 INJECTION INTRAMUSCULAR EVERY 4 HOURS PRN
Status: DISCONTINUED | OUTPATIENT
Start: 2020-01-01 | End: 2020-01-01 | Stop reason: HOSPADM

## 2020-01-01 RX ORDER — SODIUM CHLORIDE 0.9 % (FLUSH) 0.9 %
10 SYRINGE (ML) INJECTION PRN
Status: DISCONTINUED | OUTPATIENT
Start: 2020-01-01 | End: 2020-01-01 | Stop reason: HOSPADM

## 2020-01-01 RX ORDER — BISACODYL 10 MG
10 SUPPOSITORY, RECTAL RECTAL DAILY PRN
Status: DISCONTINUED | OUTPATIENT
Start: 2020-01-01 | End: 2020-01-01 | Stop reason: HOSPADM

## 2020-01-01 RX ORDER — ACETAMINOPHEN 650 MG/1
650 SUPPOSITORY RECTAL EVERY 6 HOURS PRN
Status: DISCONTINUED | OUTPATIENT
Start: 2020-01-01 | End: 2020-01-01 | Stop reason: HOSPADM

## 2020-01-01 RX ORDER — MIDODRINE HYDROCHLORIDE 10 MG/1
10 TABLET ORAL 3 TIMES DAILY
Status: DISCONTINUED | OUTPATIENT
Start: 2020-01-01 | End: 2020-01-01 | Stop reason: HOSPADM

## 2020-01-01 RX ORDER — DEXTROSE MONOHYDRATE 50 MG/ML
100 INJECTION, SOLUTION INTRAVENOUS PRN
Status: DISCONTINUED | OUTPATIENT
Start: 2020-01-01 | End: 2020-01-01 | Stop reason: HOSPADM

## 2020-01-01 RX ORDER — MIDODRINE HYDROCHLORIDE 10 MG/1
TABLET ORAL
Qty: 90 TABLET | Refills: 2 | Status: SHIPPED | OUTPATIENT
Start: 2020-01-01 | End: 2020-01-01

## 2020-01-01 RX ORDER — 0.9 % SODIUM CHLORIDE 0.9 %
2000 INTRAVENOUS SOLUTION INTRAVENOUS ONCE
Status: COMPLETED | OUTPATIENT
Start: 2020-01-01 | End: 2020-01-01

## 2020-01-01 RX ORDER — LORAZEPAM 2 MG/ML
0.5 CONCENTRATE ORAL
Status: DISCONTINUED | OUTPATIENT
Start: 2020-01-01 | End: 2020-01-01

## 2020-01-01 RX ORDER — ONDANSETRON 2 MG/ML
4 INJECTION INTRAMUSCULAR; INTRAVENOUS EVERY 6 HOURS PRN
Status: DISCONTINUED | OUTPATIENT
Start: 2020-01-01 | End: 2020-01-01 | Stop reason: HOSPADM

## 2020-01-01 RX ORDER — DEXTROSE MONOHYDRATE 25 G/50ML
12.5 INJECTION, SOLUTION INTRAVENOUS PRN
Status: DISCONTINUED | OUTPATIENT
Start: 2020-01-01 | End: 2020-01-01 | Stop reason: HOSPADM

## 2020-01-01 RX ORDER — 0.9 % SODIUM CHLORIDE 0.9 %
2000 INTRAVENOUS SOLUTION INTRAVENOUS ONCE
Status: DISCONTINUED | OUTPATIENT
Start: 2020-01-01 | End: 2020-01-01

## 2020-01-01 RX ORDER — M-VIT,TX,IRON,MINS/CALC/FOLIC 27MG-0.4MG
1 TABLET ORAL 2 TIMES DAILY
Status: DISCONTINUED | OUTPATIENT
Start: 2020-01-01 | End: 2020-01-01 | Stop reason: HOSPADM

## 2020-01-01 RX ORDER — SODIUM CHLORIDE 9 MG/ML
INJECTION, SOLUTION INTRAVENOUS CONTINUOUS
Status: DISCONTINUED | OUTPATIENT
Start: 2020-01-01 | End: 2020-01-01

## 2020-01-01 RX ORDER — THIAMINE HYDROCHLORIDE 100 MG/ML
100 INJECTION, SOLUTION INTRAMUSCULAR; INTRAVENOUS DAILY
Status: DISCONTINUED | OUTPATIENT
Start: 2020-01-01 | End: 2020-01-01

## 2020-01-01 RX ORDER — POLYETHYLENE GLYCOL 3350 17 G/17G
17 POWDER, FOR SOLUTION ORAL DAILY PRN
Status: DISCONTINUED | OUTPATIENT
Start: 2020-01-01 | End: 2020-01-01 | Stop reason: HOSPADM

## 2020-01-01 RX ORDER — MORPHINE SULFATE 2 MG/ML
2 INJECTION, SOLUTION INTRAMUSCULAR; INTRAVENOUS
Status: DISCONTINUED | OUTPATIENT
Start: 2020-01-01 | End: 2020-01-01 | Stop reason: HOSPADM

## 2020-01-01 RX ORDER — DEXTROSE AND SODIUM CHLORIDE 5; .9 G/100ML; G/100ML
INJECTION, SOLUTION INTRAVENOUS CONTINUOUS
Status: DISCONTINUED | OUTPATIENT
Start: 2020-01-01 | End: 2020-01-01

## 2020-01-01 RX ORDER — DIGOXIN 125 MCG
125 TABLET ORAL DAILY
Status: DISCONTINUED | OUTPATIENT
Start: 2020-01-01 | End: 2020-01-01 | Stop reason: HOSPADM

## 2020-01-01 RX ORDER — ACETAMINOPHEN 325 MG/1
650 TABLET ORAL EVERY 6 HOURS PRN
Status: DISCONTINUED | OUTPATIENT
Start: 2020-01-01 | End: 2020-01-01 | Stop reason: HOSPADM

## 2020-01-01 RX ORDER — DIGOXIN 125 MCG
TABLET ORAL
Qty: 30 TABLET | Refills: 10 | Status: SHIPPED | OUTPATIENT
Start: 2020-01-01

## 2020-01-01 RX ORDER — BUMETANIDE 1 MG/1
0.5 TABLET ORAL 2 TIMES DAILY
Qty: 30 TABLET | Refills: 1 | Status: SHIPPED | OUTPATIENT
Start: 2020-01-01 | End: 2020-01-01

## 2020-01-01 RX ORDER — POTASSIUM CHLORIDE 20 MEQ/1
TABLET, EXTENDED RELEASE ORAL
Qty: 45 TABLET | Refills: 10 | Status: SHIPPED | OUTPATIENT
Start: 2020-01-01

## 2020-01-01 RX ORDER — METOPROLOL SUCCINATE 25 MG/1
12.5 TABLET, EXTENDED RELEASE ORAL DAILY
Status: CANCELLED | OUTPATIENT
Start: 2020-01-01

## 2020-01-01 RX ORDER — ONDANSETRON 2 MG/ML
4 INJECTION INTRAMUSCULAR; INTRAVENOUS EVERY 6 HOURS PRN
Status: DISCONTINUED | OUTPATIENT
Start: 2020-01-01 | End: 2020-01-01

## 2020-01-01 RX ORDER — GLYCOPYRROLATE 0.2 MG/ML
0.2 INJECTION INTRAMUSCULAR; INTRAVENOUS EVERY 4 HOURS PRN
Status: DISCONTINUED | OUTPATIENT
Start: 2020-01-01 | End: 2020-01-01 | Stop reason: HOSPADM

## 2020-01-01 RX ORDER — MORPHINE SULFATE 2 MG/ML
1 INJECTION, SOLUTION INTRAMUSCULAR; INTRAVENOUS
Status: DISCONTINUED | OUTPATIENT
Start: 2020-01-01 | End: 2020-01-01 | Stop reason: HOSPADM

## 2020-01-01 RX ORDER — LOPERAMIDE HYDROCHLORIDE 2 MG/1
2 CAPSULE ORAL PRN
Status: DISCONTINUED | OUTPATIENT
Start: 2020-01-01 | End: 2020-01-01 | Stop reason: HOSPADM

## 2020-01-01 RX ORDER — DIGOXIN 125 MCG
TABLET ORAL
Qty: 30 TABLET | Refills: 4 | Status: SHIPPED | OUTPATIENT
Start: 2020-01-01 | End: 2020-01-01

## 2020-01-01 RX ORDER — NICOTINE POLACRILEX 4 MG
15 LOZENGE BUCCAL PRN
Status: DISCONTINUED | OUTPATIENT
Start: 2020-01-01 | End: 2020-01-01 | Stop reason: HOSPADM

## 2020-01-01 RX ORDER — DEXTROSE MONOHYDRATE 25 G/50ML
25 INJECTION, SOLUTION INTRAVENOUS ONCE
Status: COMPLETED | OUTPATIENT
Start: 2020-01-01 | End: 2020-01-01

## 2020-01-01 RX ORDER — MIDODRINE HYDROCHLORIDE 10 MG/1
TABLET ORAL
Qty: 270 TABLET | Refills: 2 | Status: SHIPPED | OUTPATIENT
Start: 2020-01-01

## 2020-01-01 RX ORDER — SODIUM CHLORIDE 0.9 % (FLUSH) 0.9 %
10 SYRINGE (ML) INJECTION EVERY 12 HOURS SCHEDULED
Status: DISCONTINUED | OUTPATIENT
Start: 2020-01-01 | End: 2020-01-01 | Stop reason: HOSPADM

## 2020-01-01 RX ADMIN — MORPHINE SULFATE 1 MG: 2 INJECTION, SOLUTION INTRAMUSCULAR; INTRAVENOUS at 02:42

## 2020-01-01 RX ADMIN — THIAMINE HYDROCHLORIDE 100 MG: 100 INJECTION, SOLUTION INTRAMUSCULAR; INTRAVENOUS at 01:00

## 2020-01-01 RX ADMIN — LORAZEPAM 1 MG: 2 INJECTION INTRAMUSCULAR; INTRAVENOUS at 17:21

## 2020-01-01 RX ADMIN — DEXTROSE AND SODIUM CHLORIDE: 5; 900 INJECTION, SOLUTION INTRAVENOUS at 06:02

## 2020-01-01 RX ADMIN — DEXTROSE MONOHYDRATE 25 G: 25 INJECTION, SOLUTION INTRAVENOUS at 17:42

## 2020-01-01 RX ADMIN — DEXTROSE MONOHYDRATE 12.5 G: 25 INJECTION, SOLUTION INTRAVENOUS at 05:18

## 2020-01-01 RX ADMIN — Medication 10 ML: at 02:43

## 2020-01-01 RX ADMIN — Medication 10 ML: at 20:57

## 2020-01-01 RX ADMIN — DEXTROSE AND SODIUM CHLORIDE: 5; 900 INJECTION, SOLUTION INTRAVENOUS at 18:39

## 2020-01-01 RX ADMIN — SODIUM CHLORIDE: 9 INJECTION, SOLUTION INTRAVENOUS at 23:28

## 2020-01-01 RX ADMIN — SODIUM CHLORIDE 2000 ML: 9 INJECTION, SOLUTION INTRAVENOUS at 02:28

## 2020-01-01 RX ADMIN — SODIUM CHLORIDE 500 ML: 9 INJECTION, SOLUTION INTRAVENOUS at 21:31

## 2020-01-01 RX ADMIN — DEXTROSE AND SODIUM CHLORIDE: 5; 900 INJECTION, SOLUTION INTRAVENOUS at 08:00

## 2020-01-01 RX ADMIN — MORPHINE SULFATE 1 MG: 2 INJECTION, SOLUTION INTRAMUSCULAR; INTRAVENOUS at 13:25

## 2020-01-01 ASSESSMENT — PAIN SCALES - GENERAL: PAINLEVEL_OUTOF10: 2

## 2020-01-06 PROBLEM — N18.2 CKD (CHRONIC KIDNEY DISEASE), STAGE II: Status: ACTIVE | Noted: 2020-01-01

## 2020-01-06 NOTE — PROGRESS NOTES
[facility]    [facility], [address]   [facility], [place] [address], [address]  [phone]  Progress Note  1/6/2020 1:33 PM      Pt Name:    Dior Miller  YOB: 1924  Primary Care Physician:  Tamy Banuelos. Itz Lu, APRN - CNP     Chief Complaint:   Chief Complaint   Patient presents with    Follow-up     CKD III        History of Present Illness: This is a follow-up visit for CKD II/volume overload. She was last seen in clinic 4 months ago. She was hospitalized in August for left hip fracture, volume overload, decompensated CHF (EF 40-45%) and had DVT s/p IVC filter. She developed MAG which resolved. Today she is feeling ok. She was having a poor appetite which is improved. Her edema is controlled on Bumex 0.5 mg BID. Also on Midodrine for low Bps. She does not check her BP at home. She is on KCl 10 meq daily. She has nocturia x 4. Comorbidities include systolic CHF with EF 06-23%, severe MR< pulmonary HTN, small pericardial effusion, hip fx, DVT s/p IVC filter, hx rectal CA with colostomy. Pertinent items are noted in HPI. Past History:  Past Medical History:   Diagnosis Date    Cancer [facility])     Rectal Cancer    GERD (gastroesophageal reflux disease)     History of rectal cancer 2008    Hypertension      Past Surgical History:   Procedure Laterality Date    COLONOSCOPY      COLOSTOMY      COLOSTOMY Left     2008    PARTIAL HYSTERECTOMY      TONSILLECTOMY      TOTAL HIP ARTHROPLASTY Left 7/30/2019    LEFT GERMAIN HIP performed by Marlin Pickett MD at [address]:  /67 (Site: Right Upper Arm, Position: Sitting, Cuff Size: Small Adult)   Pulse 71   Wt 106 lb (48.1 kg)   SpO2 98%   BMI 17.11 kg/m²   Wt Readings from Last 3 Encounters:   01/06/20 106 lb (48.1 kg)   12/10/19 101 lb (45.8 kg)   10/08/19 107 lb 9.6 oz (48.8 kg)     Body mass index is 17.11 kg/m².      General Appearance: awake, alert, nontoxic  HEENT: EOMI, moist oral mucus membranes, hard of hearing  Neck: No jugular venous distention, no carotid bruit  Lungs: Air entry B/L, no crackles or rales, no use of accessory muscles  Heart: S1, S2 heard, no rub  GI: soft, non-tender, no guarding, +colostomy  Extremities: trace right LE edema + chronic left LE edema with chronic venous stasis changes  Skin: warm, dry  Neurologic: no tremor, no asterixis, no focal neurologic deficits     Medications:  Current Outpatient Medications   Medication Sig Dispense Refill    midodrine (PROAMATINE) 10 MG tablet Take 1 tablet by mouth 3 times daily (with meals) Additional RF per her PCP 90 tablet 2    digoxin (LANOXIN) 125 MCG tablet Take 1 tablet by mouth daily 30 tablet 3    potassium chloride (KLOR-CON M) 20 MEQ extended release tablet Take 0.5 tablets by mouth daily 45 tablet 0    metoprolol succinate (TOPROL XL) 25 MG extended release tablet Take 0.5 tablets by mouth daily Additional RF per her PCP 45 tablet 1    bumetanide (BUMEX) 1 MG tablet Take 0.5 tablets by mouth 2 times daily 30 tablet 3    magnesium oxide (MAG-OX) 400 (241.3 Mg) MG TABS tablet Take 1 tablet by mouth 2 times daily      Multiple Vitamins-Minerals (PRESERVISION AREDS) CAPS Take 1 capsule by mouth 2 times daily       No current facility-administered medications for this visit.          Laboratory & Diagnostics:  CBC:   Lab Results   Component Value Date    WBC 6.2 12/19/2019    HGB 15.5 12/19/2019    HCT 50.3 (H) 12/19/2019    .2 (H) 12/19/2019     12/19/2019     BMP:    Lab Results   Component Value Date     12/19/2019     11/14/2019     09/26/2019    K 4.8 12/19/2019    K 4.6 11/14/2019    K 4.7 09/26/2019    CL 95 (L) 12/19/2019    CL 96 (L) 11/14/2019    CL 96 (L) 09/26/2019    CO2 29 12/19/2019    CO2 29 11/14/2019    CO2 33 09/26/2019    BUN 30 (H) 12/19/2019    BUN 25 (H) 11/14/2019    BUN 30 (H) 09/26/2019    CREATININE 0.8 12/19/2019    CREATININE 0.9 11/14/2019    CREATININE 1.1 09/26/2019 GLUCOSE 99 12/19/2019    GLUCOSE 90 11/14/2019    GLUCOSE 114 (H) 09/26/2019      Hepatic:   Lab Results   Component Value Date    AST 24 08/03/2019    AST 34 08/02/2019    AST 41 (H) 08/01/2019    ALT 16 08/03/2019    ALT 22 08/02/2019    ALT 34 08/01/2019    BILITOT 1.3 (H) 08/03/2019    BILITOT 1.4 (H) 08/02/2019    BILITOT 0.9 08/01/2019    ALKPHOS 73 08/03/2019    ALKPHOS 83 08/02/2019    ALKPHOS 80 08/01/2019     BNP: No results found for: BNP  Lipids:   Lab Results   Component Value Date    CHOL 215 (H) 08/10/2012    HDL 47 08/10/2012     INR:   Lab Results   Component Value Date    INR 1.27 (H) 08/01/2019    INR 1.54 (H) 07/28/2019     URINE:   Lab Results   Component Value Date    NAUR < 20 07/31/2019    PROTUR 28.3 08/28/2019     Lab Results   Component Value Date    NITRU NEGATIVE 08/11/2019    COLORU YELLOW 08/11/2019    PHUR 7.0 08/11/2019    LABCAST NONE SEEN 08/02/2019    LABCAST NONE SEEN 08/02/2019    WBCUA 0-2 08/02/2019    RBCUA 10-15 08/02/2019    MUCUS NONE SEEN 07/31/2019    YEAST NONE SEEN 08/02/2019    BACTERIA NONE 08/02/2019    SPECGRAV 1.010 08/02/2019    LEUKOCYTESUR NEGATIVE 08/11/2019    UROBILINOGEN 0.2 08/11/2019    BILIRUBINUR NEGATIVE 08/11/2019    BLOODU NEGATIVE 08/11/2019    GLUCOSEU NEGATIVE 08/11/2019    KETUA NEGATIVE 08/11/2019    AMORPHOUS DEBRIS 07/31/2019      Microalbumen/Creatinine ratio:  No components found for: RUCREAT        Impression/Plan:   1. CKD III - stable. S/p MAG in hospital from hypotension, cardiorenal syndrome. 2. Systolic CHF: volume status controlled on Bumex 0.5 mg BID  3. Hypokalemia -controlled on KCl 10 meq daily  4. Hypotension - continue with Midodrine 10 mg TID  5. DVT s/p IVC filter  6. Hip fracture s/p repair      Bloodwork and medications were reviewed and plan of care discussed with the patient. Renal issues resolved. Continue to f/u with cardiology and PCP. See me as needed.        Kvng Pisano, DO  [facility]

## 2020-05-27 NOTE — PROGRESS NOTES
[address] [address]  [address] [facility] [address]  [place] [address]  Dept: [phone]  Dept Fax: [phone]  Loc: [phone]    Visit Date: 5/27/2020    Neo Alfredo is a 80 y.o. female who presents todayfor:  Chief Complaint   Patient presents with    Follow-up    Cardiac Valve Problem    Hypertension     Sees Cassia  Started with hip fracture  Then found to have MR that is severe and AS that is moderate BP is stable   Some baseline dyspnea on exertion   No chest pain   Limited older patient   BP is stable   No dizziness  No syncope  She is not very aware of her condition   Does have EF 45%  Also has A fib   Looks chronic A fib     HPI:  HPI  Past Medical History:   Diagnosis Date    Cancer ([address]     Rectal Cancer    GERD (gastroesophageal reflux disease)     History of rectal cancer 2008    Hypertension       Past Surgical History:   Procedure Laterality Date    COLONOSCOPY      COLOSTOMY      COLOSTOMY Left     2008    PARTIAL HYSTERECTOMY      TONSILLECTOMY      TOTAL HIP ARTHROPLASTY Left 7/30/2019    LEFT GERMAIN HIP performed by Ange Valdivia MD at [address] [place] [address] History   Problem Relation Age of Onset    Heart Failure Father         CONGESTIVE    Other Mother         ULCER DISEASE    Arthritis Sister     Other Brother         VASCULAR DISEASE    Other Son         HYPOTHYROIDISM     Social History     Tobacco Use    Smoking status: Never Smoker    Smokeless tobacco: Never Used   Substance Use Topics    Alcohol use: No      Current Outpatient Medications   Medication Sig Dispense Refill    potassium chloride (KLOR-CON M) 20 MEQ extended release tablet Take 0.5 tablets by mouth daily 45 tablet 0    bumetanide (BUMEX) 1 MG tablet Take 0.5 tablets by mouth 2 times daily 30 tablet 1    midodrine (PROAMATINE) 10 MG tablet TAKE 1 TABLET BY MOUTH THREE TIMES DAILY WITH MEALS *ADDITIONAL REFILLS PER HER PCP* 90 tablet 2    metoprolol succinate (TOPROL XL) 25 MG extended release tablet TAKE 1/2 TABLET BY MOUTH EVERY DAY *ADDITIONAL RF PER HER PCP* 45 tablet 2    digoxin (LANOXIN) 125 MCG tablet TAKE 1 TABLET BY MOUTH EVERY DAY 30 tablet 4    magnesium oxide (MAG-OX) 400 (241.3 Mg) MG TABS tablet Take 1 tablet by mouth 2 times daily      Multiple Vitamins-Minerals (PRESERVISION AREDS) CAPS Take 1 capsule by mouth 2 times daily       No current facility-administered medications for this visit. No Known Allergies  Health Maintenance   Topic Date Due    DTaP/Tdap/Td vaccine (1 - Tdap) 01/20/1943    Shingles Vaccine (1 of 2) 01/20/1974    Pneumococcal 65+ years Vaccine (1 of 1 - PPSV23) 01/20/1989    Annual Wellness Visit (AWV)  07/28/2019    Flu vaccine (Season Ended) 09/01/2020    Potassium monitoring  12/19/2020    Creatinine monitoring  12/19/2020    Hepatitis A vaccine  Aged Out    Hepatitis B vaccine  Aged Out    Hib vaccine  Aged Out    Meningococcal (ACWY) vaccine  Aged Out       Subjective:  Review of Systems  General:   No fever, no chills, No fatigue or weight loss  Pulmonary:    No dyspnea, no wheezing  Cardiac:    Denies recent chest pain,   GI:     No nausea or vomiting, no abdominal pain  Neuro:    No dizziness or light headedness,   Musculoskeletal:  limited patient   Extremities:   No edema, no obvious claudication       Objective:  Physical Exam  /70   Pulse 58   Ht 5' 7\" (1.702 m)   Wt 100 lb (45.4 kg)   BMI 15.66 kg/m²   General:   Well developed, well nourished  Lungs:   Clear to auscultation  Heart:    Normal S1 S2, Slight murmur. no rubs, no gallops  Abdomen:   Soft, non tender, no organomegalies, positive bowel sounds  Extremities:   No edema, no cyanosis, good peripheral pulses  Neurological:   Awake, alert, oriented. No obvious focal deficits  Musculoskelatal:  No obvious deformities    Assessment:      Diagnosis Orders   1. Nonrheumatic mitral valve regurgitation     2.  Nonrheumatic aortic valve

## 2020-12-09 PROBLEM — M62.82 RHABDOMYOLYSIS: Status: ACTIVE | Noted: 2020-01-01

## 2020-12-09 NOTE — ED NOTES
BS 25 on recheck. Pt refusing labs, vitals. Daughter in law and son at bedside supportive on pt's decision. After some encouragement pt drinking OJ 8 oz and now eating peanut butter. Pt is alert and oriented x4. Breathing easy and unlabored on RA.       Brandi Caballero RN  12/09/20 [id number]

## 2020-12-09 NOTE — ED NOTES
Pt not allowing staff to assess pt vital signs or blood sugar at this time. Pt A&O X4 at this time. Advising providers at this time.            Lan, [facility]  12/09/20 [id number]

## 2020-12-09 NOTE — ED NOTES
Pt presents to ED via [facility] for fall that occurred either today or yesterday. Pt last known well is 200 yesterday, when pt child last spoke with pt on phone. Pt child attempted to reach pt today, and did not get answer. Son travels to pt house and finds pt supine in reading room of house. EMS states blood sugar reading of 34, /80, with respirations noted at 25. Pt fingers and hands appear to be bruised or cyanotic and are warm to touch. No obvious sign of injury noted and no uncontrolled bleeding noted at this time. Pt states upon arrival that she refuses all treatment and wishes to go home. Pt A&O X4 at this time. No distress noted at this time.        Lan, [facility]  12/09/20 [id number]

## 2020-12-09 NOTE — ACP (ADVANCE CARE PLANNING)
Advance Care Planning     General Advance Care Planning (ACP) Conversation    Date of Conversation: 12/9/2020  Conducted with: Patient with Decision Making Capacity    Healthcare Decision Maker:        Click here to complete [address] including selection of the Healthcare Decision Maker Relationship (ie \"Primary\")  Today we referred to ACP Clinical Specialist for assistance.     Content/Action Overview:  DECLINED ACP Conversation - will revisit periodically  Reviewed DNR/DNI and patient elects Full Code (Attempt Resuscitation)  treatment goals  Nothing more    Length of Voluntary ACP Conversation in minutes:  <16 minutes (Non-Billable)    Christian Aranda

## 2020-12-09 NOTE — ED NOTES
Daughter-in-law at bedside at this time. Pt provided meal per Dr. Maguire request.  Daughter-in-law encouraged to assist pt to eat for hypoglycemia concern. Dr. Maguire advises to provide food and give pt time prior to further intervention.             [facility]  12/09/20 [id number]

## 2020-12-09 NOTE — ED NOTES
Bed: 010A  Expected date: 12/9/20  Expected time: 2:34 PM  Means of arrival: [facility]  Comments:     Maria Esther Sweeney RN  12/09/20 2792

## 2020-12-09 NOTE — ED PROVIDER NOTES
[address] [facility] COMPLAINT    Chief Complaint   Patient presents with    Fall    Hypoglycemia       Nurses Notes reviewed and I agree except as noted in the HPI. HPI    Elif Lao is a 80 y.o. female who presents for evaluation of fall and low sugar. The patient was brought in here by the EMS who checked her sugar and it is reading low. The son states that he is trying to call her the whole day today however she is not answering,  he was able to talk to her 6:30 yesterday afternoon. The patient's son then went to see her and found her about 2:30 this afternoon and her reading room lying down. The son is not sure how long she has been in the floor. The patient denying any pain denies any loss of consciousness no headache neck pain back pain except for arthritis. Patient when seen is not cooperating however she is alert she knows what she is talking about she does not like to do any testing she just want to go home      REVIEW OF SYSTEMS    Review of Systems   Constitutional: Negative for appetite change, chills, diaphoresis, fatigue and fever. HENT: Negative for congestion, ear pain, postnasal drip, rhinorrhea, sinus pressure, sneezing, sore throat, trouble swallowing and voice change. Respiratory: Negative for cough, chest tightness, shortness of breath and wheezing. Cardiovascular: Negative for chest pain, palpitations and leg swelling. Gastrointestinal: Negative for abdominal pain, constipation, diarrhea, nausea and vomiting. Musculoskeletal: Positive for arthralgias. Negative for back pain, joint swelling, myalgias, neck pain and neck stiffness. Neurological: Positive for dizziness and weakness. Negative for syncope, light-headedness, numbness and headaches.        PAST MEDICAL HISTORY     has a past medical history of Cancer ([address] GERD (gastroesophageal reflux disease), History of rectal cancer, Have mom bring both boys for a 4:30 appt. Ask her to come at least 10 min early for check in, then block my 4:30, 4;45, and make my 5:00 appt  MD only. and Hypertension. SURGICAL HISTORY   has a past surgical history that includes Tonsillectomy; partial hysterectomy (cervix not removed); Colonoscopy; colostomy; Total hip arthroplasty (Left, 2019); and colostomy (Left). CURRENT MEDICATIONS    Previous Medications    BUMETANIDE (BUMEX) 1 MG TABLET    TAKE 1/2 TABLET BY MOUTH TWICE DAILY    DIGOXIN (LANOXIN) 125 MCG TABLET    TAKE 1 TABLET BY MOUTH EVERY DAY    MAGNESIUM OXIDE (MAG-OX) 400 (241.3 MG) MG TABS TABLET    Take 1 tablet by mouth 2 times daily    METOPROLOL SUCCINATE (TOPROL XL) 25 MG EXTENDED RELEASE TABLET    TAKE 1/2 TABLET BY MOUTH EVERY DAY *ADDITIONAL RF PER HER PCP*    MIDODRINE (PROAMATINE) 10 MG TABLET    TAKE 1 TABLET BY MOUTH THREE TIMES DAILY WITH MEALS *ADDITIONAL REFILLS PER HER PCP*    MULTIPLE VITAMINS-MINERALS (PRESERVISION AREDS) CAPS    Take 1 capsule by mouth 2 times daily    POTASSIUM CHLORIDE (KLOR-CON M) 20 MEQ EXTENDED RELEASE TABLET    TAKE 1/2 TABLET BY MOUTH DAILY       ALLERGIES    has No Known Allergies. FAMILY HISTORY    She indicated that the status of her mother is unknown. She indicated that her father is . She indicated that the status of her sister is unknown. She indicated that the status of her brother is unknown. She indicated that the status of her son is unknown.   family history includes Arthritis in her sister; Heart Failure in her father; Other in her brother, mother, and son. SOCIAL HISTORY     reports that she has never smoked. She has never used smokeless tobacco. She reports that she does not drink alcohol or use drugs. PHYSICAL EXAM      INITIAL VITALS: BP (!) 95/47   Pulse 86   Resp 18   SpO2 96% Estimated body mass index is 15.66 kg/m² as calculated from the following:    Height as of 20: 5' 7\" (1.702 m). Weight as of 20: 100 lb (45.4 kg). Physical Exam  Vitals signs reviewed. Constitutional:       Appearance: She is well-developed.    HENT:      Head: Normocephalic and atraumatic. Right Ear: External ear normal.      Left Ear: External ear normal.      Nose: Nose normal.   Eyes:      General: No scleral icterus. Conjunctiva/sclera: Conjunctivae normal.      Pupils: Pupils are equal, round, and reactive to light. Neck:      Musculoskeletal: Normal range of motion and neck supple. Thyroid: No thyromegaly. Vascular: No JVD. Cardiovascular:      Rate and Rhythm: Normal rate and regular rhythm. Heart sounds: No murmur. No friction rub. Pulmonary:      Effort: Pulmonary effort is normal.      Breath sounds: Normal breath sounds. No wheezing or rales. Chest:      Chest wall: No tenderness. Abdominal:      General: Bowel sounds are normal.      Palpations: Abdomen is soft. There is no mass. Tenderness: There is no abdominal tenderness. Musculoskeletal:      Comments: Left knee showed swelling with mild to moderate tenderness however was able to flex extend the knee without difficulty   Lymphadenopathy:      Cervical: No cervical adenopathy. Skin:     Findings: No rash. Neurological:      Mental Status: She is alert and oriented to person, place, and time. Psychiatric:         Behavior: Behavior is cooperative. MEDICAL DECISION MAKING    DIFFERENTIAL DIAGNOSIS:  Fall, rhabdomyolysis, hypoglycemia, MAG, electrolyte and metabolic disorder      DIAGNOSTIC RESULTS      RADIOLOGY:  I have reviewed radiologic plain film image(s). The plain films will be read or overread by the radiologist.All other non-plain film images(s) such as CT, Ultrasound and MRI have been read by the radiologist.  XR HIP W PELVIS MIN 5 VWS BILATERAL   Final Result    Left hip arthroplasty without evidence of acute osseous injury of the bilateral hips. **This report has been created using voice recognition software. It may contain minor errors which are inherent in voice recognition technology. **      Final report electronically signed by Dr. David Arias MD on 12/9/2020 7:16 PM      XR KNEE LEFT (1-2 VIEWS)   Final Result       1. No evidence of acute osseous injury of the left knee. 2. Moderate tricompartmental osteophyte arthritis. **This report has been created using voice recognition software. It may contain minor errors which are inherent in voice recognition technology. **      Final report electronically signed by Dr. David Arias MD on 12/9/2020 7:15 PM      XR PELVIS (MIN 3 VIEWS)    (Results Pending)       LABS:   Labs Reviewed   CBC WITH AUTO DIFFERENTIAL - Abnormal; Notable for the following components:       Result Value    RBC 3.95 (*)     .8 (*)     MCHC 31.0 (*)     RDW-CV 15.8 (*)     RDW-SD 59.6 (*)     Platelets 681 (*)     Segs Absolute 9.5 (*)     Lymphocytes Absolute 0.7 (*)     All other components within normal limits   BASIC METABOLIC PANEL - Abnormal; Notable for the following components:    Potassium 5.6 (*)     Chloride 96 (*)     CO2 17 (*)     Glucose 306 (*)     BUN 85 (*)     CREATININE 1.9 (*)     All other components within normal limits   CK - Abnormal; Notable for the following components:     Total CK 1,342 (*)     All other components within normal limits   ANION GAP - Abnormal; Notable for the following components:    Anion Gap 24.0 (*)     All other components within normal limits   GLOMERULAR FILTRATION RATE, ESTIMATED - Abnormal; Notable for the following components:    Est, Glom Filt Rate 24 (*)     All other components within normal limits   OSMOLALITY - Abnormal; Notable for the following components:    Osmolality Calc 311.2 (*)     All other components within normal limits   POCT GLUCOSE - Abnormal; Notable for the following components:    POC Glucose 25 (*)     All other components within normal limits   MYOGLOBIN, URINE   URINE RT REFLEX TO CULTURE   POCT GLUCOSE   POCT GLUCOSE     All other unresulted laboratory test above are normal:    Vitals:    Vitals: 12/09/20 1709 12/09/20 1800 12/09/20 2020   BP: (!) 98/55 (!) 94/53 (!) 95/47   Pulse: 84  86   Resp: 16  18   SpO2: 96%         EMERGENCY DEPARTMENT COURSE:    Medications   dextrose 5 % and 0.9 % sodium chloride infusion ( Intravenous New Bag 12/9/20 1839)   0.9 % sodium chloride bolus (has no administration in time range)   dextrose 50 % IV solution (25 g Intravenous Given 12/9/20 1742)       The pt was seen and evaluated by me. Within the department, I observed the pt's vitalsigns to be within acceptable range . Patient initially do not want to do any laboratory or any testing she is fighting us here in the emergency room even checking her sugar she refused however patient gave in finally. Laboratory and Radiological studies were performed, results were reviewed with the patient. Within the department, the pt was treated with initially with the D50 then D5 normal saline. Blood sugar has been monitored. I observed the pt's condition to be hemodynamically stable during the duration of their stay. I explained my proposed course of treatment to the pt, and they were amenable to my decision. The case is referred to the hospitalist services, Dr. Teresa Rae graciously admitted the patient      CRITICAL CARE:   None. CONSULTS:  Hospitalist services Dr. Isma Domingo:  None. FINAL IMPRESSION       1. Hypoglycemia    2. Non-traumatic rhabdomyolysis    3. MAG (acute kidney injury) ([facility] [address]    4. Dehydration    5. Fall, initial encounter          DISPOSITION/PLAN  PATIENT REFERRED TO: Patient is admitted to the hospitalist by Dr. Teresa Rae    (Please note that portions of this note were completed with a voice recognition program and electronically transcribed. Efforts were [facility] edit the dictations but occasionally words are mis-transcribed . The transcription may contain errors not detected in proofreading.   This transcription was electronically signed.)     12/09/20 9:17 PM      Therese Carmen MD Emergency room physician           Judi Sky MD  12/09/20 3502       Judi Sky MD  12/16/20 0760

## 2020-12-09 NOTE — ED NOTES
Pt to x-ray; pt has been more agreeable to testing. Son remains at bedside.       Haroon Malhotra RN  12/09/20 1017

## 2020-12-09 NOTE — ED NOTES
Dr. Emile Dakin at bedside at this time.       Lan, [facility]  12/09/20 1001

## 2020-12-10 NOTE — PLAN OF CARE
Update    Today, met with son Armando/CARLENE as well as RN to discuss goals of care moving forwards. Discussed that his mom had had a progressive decline over the last 1 to 2 months, and had been eating less. He also suspect some underlying development of dementia. She had been living on her own and he had been checking on her but had concerns leading up to this. He was aware that she did not wish for any further aggressive measures, and that her CODE STATUS was previously CCA. I discussed her current condition and the need for further work-up and invasive evaluation, and the fact that the patient has been declining any therapy or lab evaluation since her arrival.  I confirmed that for her chronic conditions, particularly her history of DVT/PE for which she has decided against any anticoagulation, her multi valvular disease for which she has declined any therapy, ongoing atrial fibrillation also declining anticoagulation. She currently has multiple acute on chronic issues which would require invasive evaluation and/or treatment. Son confirmed that we will not pursue any further evaluation, and would like to move towards hospice. He was agreeable to changing the patient's CODE STATUS to comfort care and focusing more on symptom control and comfort measures at this time. We will also consult hospice  to discuss with Max Galdamez and his wife regarding the neck steps. We will discontinue any unnecessary testing or treatments, and add on comfort measures including as needed morphine for pain, as needed Ativan for anxiety. Will consider moving to 5K pending dispo plan.

## 2020-12-10 NOTE — PROGRESS NOTES
[facility] THERAPY MISSED TREATMENT NOTE  STR[facility] ICU STEPDOWN TELEMETRY 4K      Date: 12/10/2020  Patient Name: Brett Gmaing        MRN: [id number]    : 1924  (80 y.o.)    REASON FOR MISSED TREATMENT: ST attempted to see patient to complete BSE/cognitive evaluation. RN reported that patient is not oriented, does not follow commands, and is not appropriate for ST evaluation at this time. RN also reported that patient's son has began to discuss potential Hospice. Will re-attempt on next date based on RN's approval of clinical appropriateness.      FLAVIA Disla, Speech Intern   Micheal Romero M.S. [id number] [id number]

## 2020-12-10 NOTE — PROGRESS NOTES
**This is a Medical/ PA/ APRN Student Note and is charted for educational purposes. The non-physician staff attested note is not to be used for billing purposes or to guide patient care. Please see the physician modifications/ attestation for treatment plan/suggestions. This note has been reviewed and feedback has been provided to the student. *      Hospitalist Progress Note    Patient:  Yadi España      Unit/Bed:4-27/027-A    YOB: 1924    MRN: [id number]       Acct: [de-identified]     PCP: Dotty Franco. WALTER Starkey CNP    Date of Admission: 12/9/2020    Assessment/Plan:    1. ***        Expected discharge date:     Disposition:    [] Home       [] TCU       [] Rehab       [] Psych       [] SNF       [x] [place]       [] Other-    Chief Complaint: Fall and hypoglycemia     Hospital Course: A 80year old female with history of HTN, Rectal cancer, and GERD who was found on the floor by her her son who was trying to call her but she didn't  the phone. It was also found that her blood glucose was low at 25 when she first came yesterday. Her CK was 1342 yesterday but its has been dropping and today it is 957. It was unknown how long she was on the floor. Her glucose was low at 25 yesterday, but has been improved after giving Dextrose which was discontinued after levels getting too high and today her blood glucose is 113. Her EKG has revealed A-fib. Subjective (past 24 hours):  Patient  is agitated and uncooperative. She was not answering my questions and she did not allow me to do the physical exam. She asked to leave the room.        Medications:  Reviewed    Infusion Medications    dextrose      dextrose 5 % and 0.9 % NaCl 250 mL/hr at 12/10/20 0800     Scheduled Medications    digoxin  125 mcg Oral Daily    [Held by provider] magnesium oxide  400 mg Oral Daily    midodrine  10 mg Oral TID    therapeutic multivitamin-minerals  1 tablet Oral BID    sodium chloride flush 10 mL Intravenous 2 times per day    thiamine (VITAMIN B1) IVPB  100 mg Intravenous Q24H     PRN Meds: sodium chloride flush, acetaminophen **OR** acetaminophen, polyethylene glycol, promethazine **OR** ondansetron, glucose, dextrose, glucagon (rDNA), dextrose      Intake/Output Summary (Last 24 hours) at 12/10/2020 0830  Last data filed at 12/10/2020 0524  Gross per 24 hour   Intake 3131.24 ml   Output 450 ml   Net 2681.24 ml       Diet:  Diet NPO Effective Now    Exam:  BP (!) 133/99   Pulse 73   Resp 20   Wt 121 lb (54.9 kg)   SpO2 97%   BMI 18.95 kg/m²     General appearance: Agitated and uncooperative. She looks cachectic. HEENT: She doesn't allow anyone to examine her. Neck: Supple, with full range of motion. No jugular venous distention. Respiratory:  Normal respiratory effort. Cardiovascular: She doesn't allow anyone to examine her. Abdomen: Soft, non-tender, non-distended with normal bowel sounds. Musculoskeletal: Has edema in her lower extremities bilaterally   Skin: bluish discoloration of her feet bilaterally  Neurologic: She doesn't allow anyone to examine her. Psychiatric: she is talking to her self and cannot give a coherent story. She is very agitated and uncooperative. Capillary Refill: Brisk,< 3 seconds   Peripheral Pulses: +2 palpable, equal bilaterally       Labs:   Recent Labs     12/09/20 1754   WBC 10.6   HGB 12.7   HCT 41.0   *     Recent Labs     12/09/20  1754 12/09/20  2241 12/10/20  0357    143 144   K 5.6* 5.2 5.0   CL 96* 100 106   CO2 17* 22* 19*   BUN 85* 87* 80*   CREATININE 1.9* 2.0* 1.6*   CALCIUM 8.8 8.8 8.1*   PHOS  --  4.4  --      Recent Labs     12/09/20 2241   AST 80*   ALT 47   BILITOT 1.9*   ALKPHOS 208*     No results for input(s): INR in the last 72 hours. Recent Labs     12/09/20  1754 12/09/20  2241 12/10/20  0357   CKTOTAL 1,342* 1,139* 957*     No results for input(s): PROCAL in the last 72 hours.     Microbiology: Urinalysis:      Lab Results   Component Value Date    NITRU NEGATIVE 12/10/2020    WBCUA 10-15 12/10/2020    BACTERIA MODERATE 12/10/2020    RBCUA 0-2 12/10/2020    BLOODU NEGATIVE 12/10/2020    SPECGRAV 1.010 08/02/2019    GLUCOSEU NEGATIVE 12/10/2020       Radiology:  CT HEAD WO CONTRAST   Final Result   Impression:   No definite acute intracranial abnormality is identified. This document has been electronically signed by: Didier Gardner MD on    12/10/2020 12:50 AM      All CT scans at this facility use dose modulation, iterative    reconstruction, and/or weight-based   dosing when appropriate to reduce radiation dose to as low as reasonably    achievable. XR HIP W PELVIS MIN 5 VWS BILATERAL   Final Result    Left hip arthroplasty without evidence of acute osseous injury of the bilateral hips. **This report has been created using voice recognition software. It may contain minor errors which are inherent in voice recognition technology. **      Final report electronically signed by Dr. Mike Jacobs MD on 12/9/2020 7:16 PM      XR KNEE LEFT (1-2 VIEWS)   Final Result       1. No evidence of acute osseous injury of the left knee. 2. Moderate tricompartmental osteophyte arthritis. **This report has been created using voice recognition software. It may contain minor errors which are inherent in voice recognition technology. **      Final report electronically signed by Dr. Mike Jacobs MD on 12/9/2020 7:15 PM          DVT prophylaxis: [] Lovenox                                 [] SCDs                                 [] SQ Heparin                                 [] Encourage ambulation           [] Already on Anticoagulation     Code Status: DNR-CCA    Tele:   [] yes             [] no    Active Hospital Problems    Diagnosis Date Noted    Rhabdomyolysis [M62.82] 12/09/2020       Electronically signed by Larissa Walker on 12/10/2020 at 8:30 AM   **This is a Medical/ PA/ APRN Student Note and is charted for educational purposes. The non-physician staff attested note is not to be used for billing purposes or to guide patient care. Please see the physician modifications/ attestation for treatment plan/suggestions. This note has been reviewed and feedback has been provided to the student.  *

## 2020-12-10 NOTE — PROGRESS NOTES
0800-  Discussed with Dr. Vee Conklin. Unable to assess patient (vital signs, assessment) as patient is refusing. Patient is confused and becomes angry when approaching her. Call placed to patient's son. He is coming up to visit today and understands. He had questions regarding hospice care- palliative care consulted and will further discuss when he visits. Patient is oriented to name. She is angry when approached and thinks we are in her home. She is restless. She asked to call 911. Patient talking continuously. Patient refusing to turn. Patient is dusky. Bilateral lower extremities are purple in color and cool- pulses present by doppler. 1000-  Patient continues to refuse assessment, turning, vitals. Awaiting son for further plan/goals of care. Patient still talking continuously/restless. Angry when approached. 18-  Patient's son arrived, met at length with Dr. Vee Conklin (please see his note). No change in patient behavior or condition. Patient has not rested throughout day. 1330-  PRN Morphine given, patient states pain in right leg.    1700- Tried to give patient Ativan intensol by mouth- unsuccessful as patient fought it. Dr. Vee Conklin updated and order for IV Ativan ordered. Patient continues with above behaviors. She has not rested today, appears restless, talking continuously. 1720-  IV Ativan given as ordered. Will monitor. 1755-  Patient resting in bed with eyes closed, appears comfortable at this time.

## 2020-12-10 NOTE — ED NOTES
Bedside shift report complete at this time. Pt updated on POC. Pt continues to express wanting to go home. Pt refusing vitals.  Son at bedside agreeable to current plan of care     Levi Sandoval RN  12/09/20 1958

## 2020-12-10 NOTE — PROGRESS NOTES
Pt admitted to  4K27 from ED. Complaints: Fall and low blood sugar. IV normal saline infusing into the upper arm right, condition patent and no redness at a rate of 999 mls/ hour with about 200 mls in the bag still. IV site free of s/s of infection or infiltration. Vital signs obtained. Assessment and data collection initiated. Two nurse skin assessment performed by Blanquita Barajas and Corinna GARCIA. Oriented to room. Policies and procedures for 4K explained. All questions answered with no further questions at this time. Fall prevention and safety brochure discussed with patient. Bed alarm on. Call light in reach. Patient agitated and confused. Not following commands. Combative and uncooperative. Unable to get pulse ox reading or temperature on patient. Dr. Sun Stephenson is aware and we placed 4L NC for respiratory support.

## 2020-12-10 NOTE — CARE COORDINATION
DISASTER CHARTING    12/10/20, 7:54 AM EST    DISCHARGE ONGOING EVALUATION:     Tyler day: 1  Location: [facility]/027-A Reason for admit: Rhabdomyolysis [M62.82]   Barriers to Discharge: client here with possible fall, Creatinine 1.6, elevated Troponin; monitor. ECHO today. Oxygen 2L, IVF continued  PCP: Mumtaz Reddy.  Jessica Reddy, WALTER - CNP  Patient Goals/Plan/Treatment Preferences: lives alone, has walker, has agitation, confusion, impulsive; tele-sitter in room, may need AL/ECF; SW referral

## 2020-12-10 NOTE — H&P
Hospitalist H&P    Patient:  Karri Anaya    MRN: [id number]    Unit/Bed:4K-27/027-A    Acct: [de-identified]    YOB: 1924    PCP: Kai Samuel. WALTER Benavides CNP    Date of Admission: 12/9/2020      Assessment and Plan:          1. Rhabdomyolysis-new onset. Apparent from very high total CK of 1342 with signs of dehydration including MAG with BUN to creatinine ratio greater than 20 as below. Obtain urine myoglobin, urinalysis with microscopy, troponin, and twelve-lead EKG. Likely secondary to falling down and being unable to get up. Consider injury, overheating, or excessive exertion. Dehydration also likely contributing cause. Maintain on telemetry. Monitor with every 4 hour BMP and CKD. Decrease frequency of checks as condition improves. Hold home Bumex and potassium. Aggressively fluid resuscitate with a 2 L bolus of normal saline and then a continuous infusion of 250 cc/h. Follow-up with PCP as an outpatient. 2.  Anion gap metabolic acidosis. Likely secondary to rhabdomyolysis. Also consider starvation ketoacidosis, salicylate toxicity, or toxic alcohol. Patient has a BMI of 15.66. Obtain phosphorus, salicylate level, beta-hydroxybutyrate level, and toxic alcohol level. Consult inpatient dietitian and consider nutritional supplement. Otherwise, treat as described above for rhabdomyolysis. 3.  Unspecified fall. Patient was found down at home by her son per recent history. Denies traumatic injury. Likely secondary to dehydration. Also consider unsafe home environment, deconditioning, arrhythmia. Maintain on continuous telemetry and fall precautions. As patient denies trauma and is not on blood thinner, we do not need to obtain CT head. 4.  Stage II MAG by AKIN Classification-new onset. Likely secondary to dehydration and rhabdomyolysis as above. Treat as above for 1.    5.  Hypoglycemia, unspecified.     Blood sugar of 25 and losing weight for approximately the past month. The son states that the patient lives at home and is normally completely independent. However, he stated that she seems to have been declining for the past month. He states that she seems to eat well when he brings her food, but that she struggles to feed herself otherwise. The son states that the patient's current confusion is not normal for her. The the son was counseled with regards to possible need for assisted living or nursing home placement in the future. Otherwise, affirms a story as above. ROS: Unobtainable. PMH:  Per HPI and       Diagnosis Date    Cancer [facility])     Rectal Cancer    GERD (gastroesophageal reflux disease)     History of rectal cancer 2008    Hypertension      SHX:        Procedure Laterality Date    COLONOSCOPY      COLOSTOMY      COLOSTOMY Left     2008    HYSTERECTOMY      JOINT REPLACEMENT      PARTIAL HYSTERECTOMY      TONSILLECTOMY      TOTAL HIP ARTHROPLASTY Left 7/30/2019    LEFT GERMAIN HIP performed by Everardo Farmer MD at [address] [place] [address]:       Problem Relation Age of Onset    Heart Failure Father         CONGESTIVE    Other Mother         ULCER DISEASE    Arthritis Sister     Other Brother         VASCULAR DISEASE    Other Son         HYPOTHYROIDISM     Allergies: Patient has no known allergies.   Medications:     dextrose      dextrose 5 % and 0.9 % NaCl 250 mL/hr at 12/10/20 0800      digoxin  125 mcg Oral Daily    [Held by provider] magnesium oxide  400 mg Oral Daily    midodrine  10 mg Oral TID    therapeutic multivitamin-minerals  1 tablet Oral BID    sodium chloride flush  10 mL Intravenous 2 times per day    thiamine (VITAMIN B1) IVPB  100 mg Intravenous Q24H       Vital Signs:   BP (!) 133/99   Pulse 73   Resp 20   Wt 121 lb (54.9 kg)   SpO2 97%   BMI 18.95 kg/m²      Intake/Output Summary (Last 24 hours) at 12/10/2020 0817  Last data filed at 12/10/2020 0524  Gross per 24 hour Intake 3131.24 ml   Output 450 ml   Net 2681.24 ml        General:   The patient is talking but cannot give a coherent story. Patient is covered with multiple bruises and areas of avulsed skin. Patient is emaciated. Neck: supple. No JVD. No thyromegaly. Lungs: clear to auscultation. Normal respiratory effort. No retractions. However, examination limited as patient could not follow directions to take deep breath or lean forward. Cardiac: RRR without murmur. Abdomen: soft. Nontender. Bowel sounds positive. Extremities: Extensive bilateral pitting edema of the lower extremities noted. The patient son states that this is chronic and ongoing for several years. Vasculature: capillary refill < 3 seconds. Palpable LE pulses bilaterally. Data: (All radiographs, tracings, PFTs, and imaging are personally viewed and interpreted unless otherwise noted).  Sodium 137, potassium 5.6, chloride 96, CO2 17, BUN 85, creatinine 1.9 with a baseline 0.8, anion gap of 24, estimated GFR of 24 with a baseline of approximately 58-67, current glucose of 306, and an osmolality of 211.2.   Total CK 1342.  White blood cell count 10.6, quantitative hemoglobin 12.7, .8, platelet count 365.  X-ray of the left knee showed no acute fracture.  Hip x-ray showed left hip arthroplasty no sign of acute fracture.       Electronically signed by Davidson Stone MD, MPH, [address] [place] [address] on 12/10/2020 at 8:17 AM   Supervised by Dr. Omayra Nova

## 2020-12-10 NOTE — CARE COORDINATION
DISASTER CHARTING    12/10/20, 7:54 AM EST    DISCHARGE ONGOING EVALUATION:     Tyler day: 1  Location: [facility]27/027- Reason for admit: Rhabdomyolysis [M62.82]   Barriers to Discharge: client here with possible fall, Creatinine 1.6, elevated Troponin; monitor. ECHO today. Oxygen 2L, IVF continued  PCP: Mumtaz Reddy.  Jessica Reddy, APRN - CNP  Patient Goals/Plan/Treatment Preferences: lives alone, has walker, has agitation, confusion, impulsive; tele-sitter in room, may need AL/ECF; SW referral, son/POA Deanna Amaral    Update: Hospice consulted for MSOF; await Hospice input  Electronically signed by Moi Garrett RN on 12/10/2020 at 1:52 PM

## 2020-12-10 NOTE — PROGRESS NOTES
Brief Encounter: At around 0504 hrs. I received a call from the nurse stating that the patient was hypoglycemic with a blood sugar of 26, and that hypoglycemia protocol orders were needed. I placed those orders and then went down to see the patient. Per nursing report, the patient refused orange juice. At around 0518 hrs. a half amp of D50 was given. Recheck glucose at 0533 was 76. Patient on continuous infusion of D5 with with normal saline drip at 250 cc/h. We will check blood sugars every 2 hours.     Reyna Portillo MD, MPH, [facility]

## 2020-12-10 NOTE — PROGRESS NOTES
Hospitalist Progress Note    Patient:  Kirk Raza      Unit/Bed:4K-27/027-A    YOB: 1924    MRN: [id number]       Acct: [de-identified]     PCP: Jeanine Bran, WALTER - CNP    Date of Admission: 12/9/2020    Assessment/Plan:  1. Rhabdomyolysis 2/2 Fall: Unknown whether fall was mechanical vs. Hypoglycemia/malnutrition vs. Structural heart defects per previous echo. Ordered new echo, however, patient is refusing labs and treatment at this time. Orders discontinued - refer to code status update note. - CK improving, but patient is refusing labs  - Continue IVF    2. Anion Gap Metabolic Acidosis likely 2/2 #1 and #3  - Continue IVF    3. Dehydration, Malnutrition, Hypoglycemia  - Continue IVF  - Hypoglycemic protocols in place     4. Acute on Chronic Kidney Disease, multifactorial due to #1 and #4: BUN/Creat > 20.   - Continue IVF    5. Compensated HFrEF 40-45%: On Bumex 0.5mg QD, KCl 10 mEq QD, Digoxin 125 mcg QD, Toprol XL 12.5mg QD outpatient for goal directed therapy. Also on Midodrine 10mg TID to maintain her BP. 6. Atrial fibrillation, permanent: On Digoxin. No OACs - Follows Dr. Felipe Hernández, Cardiology. Previous office note on 5/27/20 notes patient did not want to be on NAOCs or coumadin.  - Stopped Digoxin    7. 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 Pedal pulses +1 noted bilaterally. Code Status Update: DNR-CC.  Please refer to Dr. Carmella Malagon note pertaining to patient's goal's, wishes, and code status.  - Starting Ativan and Morphine PRN  - Hospice Consult    Expected discharge date: TBD    Disposition:    [] Home       [] TCU       [] Rehab       [] Psych       [] SNF       [] [place]       [] Other-    Chief Complaint: [facility] Course:   Per prior HPI,  \"Opening statement:     The patient is a 80-year-old female with a past medical history of hypertension, rectal cancer diagnosed 2008, and GERD who presents with a chief complaint of being found down on the floor of her house and hypoglycemia.     ED Information:     The patient was found on the floor of her house by her son. It was unknown how long she was on the floor, and her blood sugar was initially 25. Also have dehydration, MAG, and very elevated CPK. The the patient sent a previous attempted to reach her by phone but was unable to do so.     Per nurse report, EMS at this patient's blood sugar was 34 with a blood pressure 140/80 and respirations 25. Patient's fingers and hands appear to be bruised/cyanotic or warm to the touch. No obvious signs of injury no uncontrolled bleeding noted. At the time, patient states she refused all treatment and wanted to go home. Patient was given orange juice in the ED for hypoglycemia.     HPI:      Note: The patient was confused. Therefore, history was taken from the patient's son.     Patient son stated that she has been eating poorly and losing weight for approximately the past month. The son states that the patient lives at home and is normally completely independent. However, he stated that she seems to have been declining for the past month. He states that she seems to eat well when he brings her food, but that she struggles to feed herself otherwise. The son states that the patient's current confusion is not normal for her.     The the son was counseled with regards to possible need for assisted living or nursing home placement in the future.     Otherwise, affirms a story as above. \"    Subjective (past 24 hours): Attempted to speak to patient this morning. Patient was noted to be talking to herself. She is alert to herself and year, but not situation. She was not cooperative during patient her interview. She refused her physical exam. Per nursing staff she has been refusing her vitals, lab work, and medical treatment.      Review of Systems 144   K 5.6* 5.2 5.0   CL 96* 100 106   CO2 17* 22* 19*   BUN 85* 87* 80*   CREATININE 1.9* 2.0* 1.6*   CALCIUM 8.8 8.8 8.1*   PHOS  --  4.4  --      Recent Labs     12/09/20  2241   AST 80*   ALT 47   BILITOT 1.9*   ALKPHOS 208*     No results for input(s): INR in the last 72 hours. Recent Labs     12/09/20  1754 12/09/20  2241 12/10/20  0357   CKTOTAL 1,342* 1,139* 957*     Microbiology:      Urinalysis:      Lab Results   Component Value Date    NITRU NEGATIVE 12/10/2020    WBCUA 10-15 12/10/2020    BACTERIA MODERATE 12/10/2020    RBCUA 0-2 12/10/2020    BLOODU NEGATIVE 12/10/2020    SPECGRAV 1.010 08/02/2019    GLUCOSEU NEGATIVE 12/10/2020     Radiology:  CT HEAD WO CONTRAST   Final Result   Impression:   No definite acute intracranial abnormality is identified. This document has been electronically signed by: Deejay Varela MD on    12/10/2020 12:50 AM      All CT scans at this facility use dose modulation, iterative    reconstruction, and/or weight-based   dosing when appropriate to reduce radiation dose to as low as reasonably    achievable. XR HIP W PELVIS MIN 5 VWS BILATERAL   Final Result    Left hip arthroplasty without evidence of acute osseous injury of the bilateral hips. **This report has been created using voice recognition software. It may contain minor errors which are inherent in voice recognition technology. **      Final report electronically signed by Dr. Mian Pugh MD on 12/9/2020 7:16 PM      XR KNEE LEFT (1-2 VIEWS)   Final Result       1. No evidence of acute osseous injury of the left knee. 2. Moderate tricompartmental osteophyte arthritis. **This report has been created using voice recognition software. It may contain minor errors which are inherent in voice recognition technology. **      Final report electronically signed by Dr. Mian Pugh MD on 12/9/2020 7:15 PM        DVT prophylaxis: [] Lovenox [] SCDs                                 [] SQ Heparin                                 [] Encourage ambulation           [] Already on Anticoagulation     Code Status: DNR-CC    PT/OT Eval Status: N/A    Tele:   [] yes             [x] no    Electronically signed by Bowen Toscano DO on 12/10/2020 at 2:58 PM

## 2020-12-10 NOTE — PROGRESS NOTES
Hospice referral completed with son Dewitte Goodell and Renetta Sanchez in conference area. Patient noted to be confused with talking to unseen people, no s/s of pain or dyspnea. Hospice concepts, philosophies, and services explained. Family discussed patient decline in health in last year and half since fell and broke hip. Talked about patient decline in eating, sleeping more, increase confusion, and now delirium. Voiced inability to emotionally and physically provide care in home and will need placement in facility. Did update of cost of room and board at facility for hospice patient. Named [facility], [facility], and [facility] as facilities that would like to see about bed availability, pricing, and what visitation policy would be for hospice/end of life patient. Noted left for SW and will follow up morning of 12.11.2020. Staff to administer ativan for noted restlessness/anxiety and see effects of medication. Hospice will follow up on symptom management, call family to see if questions about hospice, and assist with plan of care for hospice.

## 2020-12-10 NOTE — PROGRESS NOTES
0503 Blood sugar 26.     0504 Messaged and called resident for hypoglycemia orders. Patient refusing orange juice or anything by mouth. 0518 Half amp of D50 given. 5 Recheck was 76. Started continuous infusion of D5 with 0.9 NaCl at 100ml/hr per Dr. Cholo Kidd. Blood sugars to be checked every 2 hours.

## 2020-12-10 NOTE — CARE COORDINATION
12/10/20, 3:15 PM EST    DISCHARGE PLANNING EVALUATION      SW received an order to see \"Discuss assisted living or nursing home\". Patient is confused, son was here but SW was not aware and now is gone. SW spoke to the nurse who suggest SW wait to see after hospice consult.

## 2020-12-11 NOTE — PROGRESS NOTES
Patient's breathing has become increasingly labored. Juanita care complete as patient is incontinent once again. Patient is repositioned. Patient does not respond to voice. This RN is able to remove patient's personal clothes and dress patient with a clean gown. Oral care also completed. Warm blanket applied.

## 2020-12-11 NOTE — PROGRESS NOTES
INT removed. Patient has rosary placed in her hands. Elan Willis supervisor, notified that family has left and patient is ready for  home.

## 2020-12-11 NOTE — PROGRESS NOTES
Checked on patient again. Patient no longer has a pulse. No heart sounds noted. Breathing ceased. Called charge RN, John Quinteros, for second verification that patient is . Charge RN, John Quinteros, notifies Calleen Kussmaul RN, roe supervisor. [id number] Call is placed to Dr. Soniya Arias for orders. Call is placed to son, Jimmy Hester. Jimmy Hester states that he will be up to see his mother in 13 minutes. Son, Jimmy Hester, and another visitor arrive to the patients room. Jimmy Hester gave verbal consent to release the body to [address] [place] [address] in [address] [place] [address] Dr. Prescott Supervisor notified.

## 2020-12-11 NOTE — PROGRESS NOTES
Physician Progress Note      PATIENTSula Matter  [facility] #:                  [id number]  :                       1924  ADMIT DATE:       2020 2:39 PM  [address] [place] DATE:  RESPONDING  PROVIDER #:        Keyshawn SILVA DO          QUERY TEXT:    Pt admitted post fall with hypoglycemia and noted to have documented   rhabdomyolysis . If possible, please document in progress notes and discharge   summary if you are evaluating and/or treating any of the following: The medical record reflects the following:  Risk Factors: fall  Clinical Indicators: post fall d/t hypoglycemia and unable to get up until   found by son  Treatment: Supportive, labs, IVF, urine myoglobin and UA  Per [email or web address]  Traumatic rhabdomyolysis cause examples: crush syndrome, prolonged   immobilization  Nontraumatic rhabdomyolysis cause examples:  marked exertion, hyperthermia,   metabolic myopathy, drugs or toxins, infections, electrolyte disorders. Options provided:  -- Traumatic rhabdomyolysis  -- Nontraumatic rhabdomyolysis  -- Other - I will add my own diagnosis  -- Disagree - Not applicable / Not valid  -- Disagree - Clinically unable to determine / Unknown  -- Refer to Clinical Documentation Reviewer    PROVIDER RESPONSE TEXT:    This patient has traumatic rhabdomyolysis. Query created by: Hudson Alcantar on 12/10/2020 11:33 AM      QUERY TEXT:    Pt admitted with hypoglycemia and noted to have confusion that is not normal   for patient. If possible, please document in the progress notes and discharge   summary if you are evaluating and / or treating any of the following:     The medical record reflects the following:  Risk Factors: hypoglycemia  Clinical Indicators: AMS, confused, disoriented to place, time, situation  Treatment: Supportive, IVF, labs, imaging  Options provided:  -- Metabolic encephalopathy  -- Toxic encephalopathy  -- Delirium due to, Please specify cause  -- Delirium  -- Other - I will add my own diagnosis  -- Disagree - Not applicable / Not valid  -- Disagree - Clinically unable to determine / Unknown  -- Refer to Clinical Documentation Reviewer    PROVIDER RESPONSE TEXT:    This patient has metabolic encephalopathy.     Query created by: Jacquelyn Henderson on 12/10/2020 11:38 AM      Electronically signed by:  Michelle Ames DO 12/10/2020 10:08 PM

## 2020-12-11 NOTE — PROGRESS NOTES
[facility]  Notice of Patient Passing      Patient Name- Garland tSone Number- [de-identified]   Attending Physician- Melanie Rosales DO    Admitted on-12/9/2020  2:39 PM     On 12/11/2020 at [phone] patient was found [phone] with:   Absence of vital signs. Absence of neurological response. Confirmed time of death at [phone]. Physician or On-call Physician notified of time of death- yes    Family present at time of death- no  Son madina called and will be coming in   Spiritual care present at time of death- no    Physician was notified and orders were obtained to release the body. Post-Mortem documentation completed; form printed, signed, and given to admitting.     Rhonda Ellsworth RN Nursing Supervisor/ Manager  12/11/20   5:44 AM

## 2020-12-12 LAB — ETHYLENE GLYCOL: NORMAL

## 2020-12-12 NOTE — DISCHARGE SUMMARY
Hospital Medicine Discharge Summary      Patient Identification:   Hannah Ball   : 1924  MRN: [id number]   Account: [de-identified]      Patient's PCP: Aislinn Meade. WALTER Orta - CNP    Admit Date: 2020     Discharge Date: 2020      Admitting Physician: Trina Navarro MD     Discharge Physician: Miesha Nieves MD     Discharge Diagnoses: Active Hospital Problems    Diagnosis Date Noted    Hypoglycemia [E16.2]     Goals of care, counseling/discussion [Z71.89]     Rhabdomyolysis [M62.82] 2020    MAG (acute kidney injury) ([facility] [address] [N17.9] 2019       The patient was seen and examined on day of discharge and this discharge summary is in conjunction with any daily progress note from day of discharge. Hospital Course:   Hannah Ball is a 80 y.o. female admitted to [address] [place] [address] on 2020 for aftereffects of a fall. The pt lived alone and fell; she lay on the floor for undetermined time and was found by her son. She was brought to ER and noted to have rhabdomyolysis and hypoglycemia as well as MAG. She was then admitted. Her son indicated she had been having a downhill course over the past month pta. The pt pursued a rapid downhill course with refusal to eat and drink. She was confused. Ultimately in view of the obvious rapid decline in her health it was elected to put her in Hospice but before that she  on  at 0357    Cause of death: dehydration          Exam:     Vitals:  Vitals:    12/10/20 0130 12/10/20 0324 12/10/20 2045 20 0230   BP:  (!) 133/99 (!) 117/54 (!) 103/58   Pulse:  73 81 92   Resp:  30   SpO2: 93% 97%  92%   Weight:  121 lb (54.9 kg)       Weight: Weight: 121 lb (54.9 kg)     24 hour intake/output:No intake or output data in the 24 hours ending 20 1528        Labs:  For convenience and continuity at follow-up the following most recent labs are provided:      CBC:    Lab Results   Component Value - CNP for the opportunity to be involved in this patient's care.     Electronically signed by Jazmin Rosales MD on 12/12/2020 at 3:28 PM
